# Patient Record
Sex: MALE | Race: WHITE | NOT HISPANIC OR LATINO | Employment: OTHER | URBAN - METROPOLITAN AREA
[De-identification: names, ages, dates, MRNs, and addresses within clinical notes are randomized per-mention and may not be internally consistent; named-entity substitution may affect disease eponyms.]

---

## 2019-10-21 ENCOUNTER — HOSPITAL ENCOUNTER (EMERGENCY)
Facility: HOSPITAL | Age: 49
Discharge: HOME | End: 2019-10-22
Attending: EMERGENCY MEDICINE | Admitting: EMERGENCY MEDICINE

## 2019-10-21 ENCOUNTER — APPOINTMENT (OUTPATIENT)
Dept: GENERAL RADIOLOGY | Facility: HOSPITAL | Age: 49
End: 2019-10-21

## 2019-10-21 DIAGNOSIS — M10.9 EXACERBATION OF GOUT: ICD-10-CM

## 2019-10-21 DIAGNOSIS — M25.551 RIGHT HIP PAIN: Primary | ICD-10-CM
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
## 2019-10-21 PROCEDURE — 99284 EMERGENCY DEPT VISIT MOD MDM: CPT

## 2019-10-21 RX ORDER — OXYCODONE HYDROCHLORIDE AND ACETAMINOPHEN 5; 325 MG/1; MG/1
2 TABLET ORAL ONCE
Status: COMPLETED | OUTPATIENT
Start: 2019-10-21 | End: 2019-10-21

## 2019-10-21 RX ADMIN — OXYCODONE HYDROCHLORIDE AND ACETAMINOPHEN 2 TABLET: 5; 325 TABLET ORAL at 23:58

## 2019-10-22 ENCOUNTER — APPOINTMENT (OUTPATIENT)
Dept: GENERAL RADIOLOGY | Facility: HOSPITAL | Age: 49
End: 2019-10-22

## 2019-10-22 VITALS
TEMPERATURE: 98.5 F | RESPIRATION RATE: 20 BRPM | HEART RATE: 69 BPM | SYSTOLIC BLOOD PRESSURE: 108 MMHG | OXYGEN SATURATION: 98 % | DIASTOLIC BLOOD PRESSURE: 71 MMHG

## 2019-10-22 PROCEDURE — 73502 X-RAY EXAM HIP UNI 2-3 VIEWS: CPT

## 2019-10-22 NOTE — ED PROVIDER NOTES
EMERGENCY DEPARTMENT ENCOUNTER    CHIEF COMPLAINT  Chief Complaint: Hip Pain  History given by: Patient  History limited by:   Room Number: 19/19  PMD: Provider, No Known      HPI:  Pt is a 49 y.o. male who presents complaining of R hip pain after falling from bed to floor. Pt has had a previous fx of the R hip. He is normally ambulatory with walker.  He denies any other injuries from his fall.  Patient states he is new to town and does not have a primary doctor.  Patient has history of chronic gout.  Patient states he is on chronic opioid pain medicine.  Patient reports a remote history of a right hip fracture with fixation.      PAST MEDICAL HISTORY  Active Ambulatory Problems     Diagnosis Date Noted   • No Active Ambulatory Problems     Resolved Ambulatory Problems     Diagnosis Date Noted   • No Resolved Ambulatory Problems     No Additional Past Medical History       PAST SURGICAL HISTORY  No past surgical history on file.    FAMILY HISTORY  No family history on file.    SOCIAL HISTORY  Social History     Socioeconomic History   • Marital status: Single     Spouse name: Not on file   • Number of children: Not on file   • Years of education: Not on file   • Highest education level: Not on file       ALLERGIES  Patient has no known allergies.    REVIEW OF SYSTEMS  Review of Systems   Constitutional: Negative for activity change, appetite change and fever.   HENT: Negative for congestion and sore throat.    Respiratory: Negative for cough and shortness of breath.    Cardiovascular: Negative for chest pain and leg swelling.   Gastrointestinal: Negative for abdominal pain, diarrhea and vomiting.   Genitourinary: Negative for decreased urine volume and dysuria.   Musculoskeletal: Positive for arthralgias and joint swelling. Negative for neck pain.   Skin: Negative for rash and wound.   Neurological: Negative for weakness, numbness and headaches.   Psychiatric/Behavioral: Negative.    All other systems reviewed and  are negative.      PHYSICAL EXAM  ED Triage Vitals [10/21/19 2335]   Temp Heart Rate Resp BP SpO2   98.3 °F (36.8 °C) 81 18 122/77 96 %      Temp src Heart Rate Source Patient Position BP Location FiO2 (%)   Tympanic -- -- -- --       Physical Exam   Constitutional: He is oriented to person, place, and time. No distress.   HENT:   Head: Normocephalic and atraumatic.   Eyes: EOM are normal. Pupils are equal, round, and reactive to light.   Neck: Normal range of motion. Neck supple.   Cardiovascular: Normal rate, regular rhythm and normal heart sounds.   Pulmonary/Chest: Effort normal and breath sounds normal. No respiratory distress.   Abdominal: Soft. There is no tenderness. There is no rebound and no guarding.   Musculoskeletal: He exhibits no edema.        Right hip: He exhibits decreased range of motion and tenderness.   Severe tophi of bilateral elbows with deformities of hand and feet c/w gout.    Neurological: He is alert and oriented to person, place, and time. He has normal sensation and normal strength.   Skin: Skin is warm and dry.   Psychiatric: Mood and affect normal.   Nursing note and vitals reviewed.    RADIOLOGY  XR Hip With or Without Pelvis 2 - 3 View Right   Preliminary Result   1. No acute osseous abnormality.                   I ordered the above noted radiological studies. Interpreted by radiologist. Reviewed by me in PACS.       PROCEDURES  Procedures      PROGRESS AND CONSULTS     11:55 PM  Ordered XR R hip. Percocet ordered for pain.    12:25 AM  Reviewed pt's history and workup with Dr. Mena.  After a bedside evaluation; Dr Mena agrees with the plan of care.    12:50 AM  Rechecked with pt. Discussed with pt about his imaging results showing nothing acute and plan to discharge. Informed of need for PCP follow-up for further tx of his gout. Pt understands and agrees with plan. All concerns were addressed.          MEDICAL DECISION MAKING  Results were reviewed/discussed with the patient and  they were also made aware of online access. Pt also made aware that some labs, such as cultures, will not be resulted during ER visit and follow up with PMD is necessary.     MDM  Number of Diagnoses or Management Options  Exacerbation of gout:   Fall, initial encounter:   Right hip pain:      Amount and/or Complexity of Data Reviewed  Tests in the radiology section of CPT®: reviewed and ordered (negative)           DIAGNOSIS  Final diagnoses:   Right hip pain   Fall, initial encounter   Exacerbation of gout       DISPOSITION  DISCHARGE    Patient discharged in stable condition.    Reviewed implications of results, diagnosis, meds, responsibility to follow up, warning signs and symptoms of possible worsening, potential complications and reasons to return to ER.    Patient/Family voiced understanding of above instructions.    Discussed plan for discharge, as there is no emergent indication for admission. Patient referred to primary care provider for BP management due to today's BP. Pt/family is agreeable and understands need for follow up and repeat testing.  Pt is aware that discharge does not mean that nothing is wrong but it indicates no emergency is present that requires admission and they must continue care with follow-up as given below or physician of their choice.     FOLLOW-UP  PATIENT LIAISON [facility] [id number] [phone]  Call   For Primary Physician follow-up         Medication List      No changes were made to your prescriptions during this visit.           Latest Documented Vital Signs:  As of 12:51 AM  BP- 112/72 HR- 74 Temp- 98.3 °F (36.8 °C) (Tympanic) O2 sat- 98%    --  Documentation assistance provided by andrew Griffiths for Gilbert June PA-C.  Information recorded by the scrnonae was done at my direction and has been verified and validated by me.     Aaron Griffiths  10/22/19 0052       Gilbert June PA  10/22/19 3919

## 2019-10-22 NOTE — ED NOTES
"Pt has hx of right hip fx, fell out of bed and is complaining of worsening hip pain, also c/o gout pain states \" I hurt all over.\"     Davina Kiran, RN  10/21/19 2026    "

## 2019-10-22 NOTE — ED PROVIDER NOTES
Pt presents to the ED c/o  right hip pain after falling out of bed this evening.  He denies hitting his head when he fell.  He states that he has a history of a right hip fracture which has been surgically repaired in [place] several years ago.  He also suffers with a severe gout and has had his left big toe amputated secondary to infection from a gouty flareup.     On exam,   His heart is regular rate and rhythm without any murmurs.  His lungs are clear to auscultation bilaterally.  His abdomen is normal active bowel sounds, soft, nontender nondistended.  His right hip has mild tenderness laterally with decreased range of motion secondary to pain.  However he is able to flex, externally and internally rotate his right hip.     Plan: I agree with plan of x-raying his hip and providing some pain control.     Attestation:  The DAVIAN and I have discussed this patient's history, physical exam, and treatment plan.  I have reviewed the documentation and personally had a face to face interaction with the patient. I affirm the documentation and agree with the treatment and plan.  The attached note describes my personal findings.       Dragon disclaimer:   Much of this encounter note is an electronic transcription/translation of spoken language to printed text.  The electronic translation of spoken language may permit erroneous, or at times, nonsensical words or phrases to be inadvertently transcribed.  Although I have reviewed the note for such areas, some may still exist.     Boy Mena MD  10/22/19 0046

## 2019-10-22 NOTE — PROGRESS NOTES
Pt states that he just moved from [place] and he needs a PCP. [facility] list given to pt and instructed on how to use the referral line. Becca Roman RN

## 2019-11-11 ENCOUNTER — OFFICE VISIT (OUTPATIENT)
Dept: FAMILY MEDICINE CLINIC | Facility: CLINIC | Age: 49
End: 2019-11-11

## 2019-11-11 VITALS
HEIGHT: 72 IN | BODY MASS INDEX: 30.26 KG/M2 | RESPIRATION RATE: 16 BRPM | SYSTOLIC BLOOD PRESSURE: 116 MMHG | DIASTOLIC BLOOD PRESSURE: 72 MMHG | HEART RATE: 72 BPM | TEMPERATURE: 97.9 F | WEIGHT: 223.4 LBS | OXYGEN SATURATION: 96 %

## 2019-11-11 DIAGNOSIS — Z23 NEED FOR VACCINATION: Primary | ICD-10-CM

## 2019-11-11 DIAGNOSIS — M1A.00X1 GOUTY ARTHROPATHY, CHRONIC, WITH TOPHI: ICD-10-CM

## 2019-11-11 DIAGNOSIS — M16.11 ARTHRITIS OF RIGHT HIP: ICD-10-CM

## 2019-11-11 PROCEDURE — 99386 PREV VISIT NEW AGE 40-64: CPT | Performed by: FAMILY MEDICINE

## 2019-11-11 PROCEDURE — G0008 ADMIN INFLUENZA VIRUS VAC: HCPCS | Performed by: FAMILY MEDICINE

## 2019-11-11 PROCEDURE — 90674 CCIIV4 VAC NO PRSV 0.5 ML IM: CPT | Performed by: FAMILY MEDICINE

## 2019-11-11 RX ORDER — FUROSEMIDE 40 MG/1
40 TABLET ORAL DAILY
COMMUNITY
End: 2019-12-12 | Stop reason: SDUPTHER

## 2019-11-11 RX ORDER — CARVEDILOL 6.25 MG/1
6.25 TABLET ORAL 2 TIMES DAILY
Refills: 0 | COMMUNITY
Start: 2019-10-24 | End: 2019-12-12 | Stop reason: SDUPTHER

## 2019-11-11 RX ORDER — OXYCODONE AND ACETAMINOPHEN 7.5; 325 MG/1; MG/1
1 TABLET ORAL EVERY 6 HOURS PRN
COMMUNITY
End: 2020-06-03 | Stop reason: SDUPTHER

## 2019-11-11 NOTE — PATIENT INSTRUCTIONS
Will obtain medical records from [place] and proceed accordingly.  Will likely place referral for GI physician to assist with management of liver cirrhosis.  Flu vaccination administered.  Prescription written for shower bench and for powered wheelchair.  Return to clinic as needed and for routine check ups.

## 2019-11-11 NOTE — PROGRESS NOTES
Subjective   Macario Carpio is a 49 y.o. male.     Chief Complaint   Patient presents with   • Establish Care     np    • Gout   • Blood Pressure Check     pt would like a refill on carvedilol    • face to face     power wheel chair        History of Present Illness   Patient presents to establish care.  Patient recently moved to [place] from [place], to live near his dad to help care for him.  He recently had lab work, echo, liver US 5 weeks ago.  He was diagnosed 2.5 years ago with cirrhosis. He has had one paracentesis, drained one liter. Has not needed any more paracenteses.  Will request health records from patient's physician in [place] and review.   He no longer drinks alcohol. Quit drinking a few years ago when he was started on pain medications for his hip and for gout.   He is disabled due to severe idiopathic gout, since he was a teenager, it has progressed over time. He has had amputation of left great toe due to gout complications. He takes daily colchicine. He reports that his uric acid stays around 10 or 11.   He takes folic acid over the counter.   He drinks a gallon of water every day.  His weight has increased since he relocated from [place], about 14 lbs, which he attributes to soft drinks, and not eating as healthy (eats fast food more in [place]).   He and his wife are living with his dad currently, and their 17.5yr old daughter.. He has two kids here in [place], grown.    He sees Dr. Wilkins for pain management, in [place]. The pain management is for his gout and for a broken hip- he has had surgery of the right hip a few years ago, has 4 screws placed and it is causing him problems now because he says the screws have moved. He has an apt with orthopedic surgeon tomorrow- Dr. Renan Mcqueen.    He has never been a smoker.    Father- idiopathic gout, CAD (5 MI's, first age 48 yrs), two CVA's (mild).  Mother- PD  Diabetes in [place]    Patient would like prescription for shower bench. He is also interested  "in getting a powered wheelchair. He is unable to use a walker or regular wheelchair due to significant gouty arthropathy of the hands and elbows. He has great difficulty walking for any significant distance due to pain.     Past Medical History:   Diagnosis Date   • Gout    • Hepatitis C      History reviewed. No pertinent surgical history.  Social History     Tobacco Use   • Smoking status: Never Smoker   • Smokeless tobacco: Never Used   Substance Use Topics   • Alcohol use: No     Frequency: Never   • Drug use: Not on file     Family History   Problem Relation Age of Onset   • Heart disease Mother    • Heart disease Father        Review of Systems   Constitutional: Negative for activity change, appetite change, fatigue, fever, unexpected weight gain and unexpected weight loss.   HENT: Negative for congestion, dental problem, sore throat and trouble swallowing.    Eyes: Negative for visual disturbance.   Respiratory: Negative for cough, chest tightness and shortness of breath.    Cardiovascular: Positive for leg swelling (Chronic, waxes and wanes.). Negative for chest pain.   Gastrointestinal: Negative for abdominal pain, constipation, diarrhea, nausea, vomiting and GERD.   Endocrine: Negative for polydipsia and polyuria.   Genitourinary: Negative for difficulty urinating.   Musculoskeletal: Positive for arthralgias. Negative for back pain.   Skin: Negative for rash.   Neurological: Negative for dizziness, numbness and headache.   Psychiatric/Behavioral: Negative for sleep disturbance and depressed mood. The patient is not nervous/anxious.        Objective   /72   Pulse 72   Temp 97.9 °F (36.6 °C) (Oral)   Resp 16   Ht 182.9 cm (72\")   Wt 101 kg (223 lb 6.4 oz)   SpO2 96%   BMI 30.30 kg/m²     Physical Exam   Constitutional: He appears well-developed and well-nourished. No distress.   Pleasant male.   HENT:   Head: Normocephalic.   Right Ear: Tympanic membrane, external ear and ear canal normal. "   Left Ear: Tympanic membrane, external ear and ear canal normal.   Mouth/Throat: Oropharynx is clear and moist.   Eyes: Conjunctivae and EOM are normal. Pupils are equal, round, and reactive to light.   Neck: Normal range of motion. Neck supple.   Cardiovascular: Normal rate, regular rhythm, normal heart sounds and intact distal pulses.   No murmur heard.  Trace pitting edema of the legs bilaterally.   Pulmonary/Chest: Effort normal and breath sounds normal. No respiratory distress. He has no wheezes. He has no rales.   Abdominal: Soft. Bowel sounds are normal. He exhibits no distension. There is no tenderness. There is no rebound and no guarding.   Musculoskeletal: Normal range of motion. He exhibits deformity (Significant gouty arthropathy, with gouty tophi of the hands bilaterally, and elbows. ).   Using cane for ambulation. Ambulates slowly, favoring right hip.   Lymphadenopathy:     He has no cervical adenopathy.   Neurological: He is alert.   Skin: Skin is warm and dry. Capillary refill takes less than 2 seconds.   Psychiatric: He has a normal mood and affect.   Vitals reviewed.      Procedures    Assessment/Plan   Macario GARCIA was seen today for establish care, gout, blood pressure check and face to face.    Diagnoses and all orders for this visit:    Need for vaccination  -     Flucelvax Quad=>4Years (Vial)    Gouty arthropathy, chronic, with tophi  -     Misc. Devices (TRANSFER BENCH) misc; 1 each Daily. To use while showering.  -     Motorized Wheelchair    Arthritis of right hip  -     Misc. Devices (TRANSFER BENCH) misc; 1 each Daily. To use while showering.  -     Motorized Wheelchair    Will obtain medical records from [place] and proceed accordingly.  Will likely place referral for GI physician to assist with management of liver cirrhosis.  Flu vaccination administered.  Prescription written for shower bench and powered wheelchair.  Return to clinic as needed and for routine check ups.        Patient  Instructions   Will obtain medical records from [place] and proceed accordingly.  Will likely place referral for GI physician to assist with management of liver cirrhosis.  Flu vaccination administered.  Prescription written for shower bench and for powered wheelchair.  Return to clinic as needed and for routine check ups.

## 2019-11-15 ENCOUNTER — TELEPHONE (OUTPATIENT)
Dept: FAMILY MEDICINE CLINIC | Facility: CLINIC | Age: 49
End: 2019-11-15

## 2019-11-15 NOTE — TELEPHONE ENCOUNTER
Patient is calling regarding his power wheelchair. He said he went to [facility] and they said they needed an order faxed over for the wheelchair. [facility] fax number is [phone]. The patients phone number is [phone].

## 2019-11-20 ENCOUNTER — TELEPHONE (OUTPATIENT)
Dept: FAMILY MEDICINE CLINIC | Facility: CLINIC | Age: 49
End: 2019-11-20

## 2019-11-20 NOTE — TELEPHONE ENCOUNTER
Received 11/24/19 records, called on 11/26/19 and called pt to get the actual EKG itself and not just the report.     Called to retrieve specific records from [facility] [place] where pt claims to have EKG, CXR and Labs, [phone]. Awaiting results.

## 2019-11-20 NOTE — TELEPHONE ENCOUNTER
Pt will be having a Hip Replacement with Dr.Matthew Mcqueen at [facility] [phone]. Pt states that he completed the EKG and Chest X-Ray. Pt will have labs done with , he just needs to have a release from .

## 2019-11-26 ENCOUNTER — TELEPHONE (OUTPATIENT)
Dept: FAMILY MEDICINE CLINIC | Facility: CLINIC | Age: 49
End: 2019-11-26

## 2019-11-26 NOTE — TELEPHONE ENCOUNTER
Called Lidia @ [facility] for her to return call to find out what they need from us to clear patient for surgery.

## 2019-12-09 ENCOUNTER — TELEPHONE (OUTPATIENT)
Dept: FAMILY MEDICINE CLINIC | Facility: CLINIC | Age: 49
End: 2019-12-09

## 2019-12-09 NOTE — TELEPHONE ENCOUNTER
Attempted to call pt advise pt upon speaking with place he will get surgery at will will need to do a complete work up cxr ekg, they are doing labs on the 17th and protime. In order to clear him.     vm is full cannot leave message. Will attempt to call back at a later time. As well as paper work we received for a power wheel chair he will need to come in for an appointment.

## 2019-12-12 RX ORDER — CARVEDILOL 6.25 MG/1
6.25 TABLET ORAL 2 TIMES DAILY
Qty: 60 TABLET | Refills: 2 | Status: SHIPPED | OUTPATIENT
Start: 2019-12-12 | End: 2020-11-30 | Stop reason: SDUPTHER

## 2019-12-12 RX ORDER — FUROSEMIDE 40 MG/1
40 TABLET ORAL DAILY
Qty: 30 TABLET | Refills: 2 | Status: SHIPPED | OUTPATIENT
Start: 2019-12-12 | End: 2020-07-24 | Stop reason: SDUPTHER

## 2019-12-12 RX ORDER — FOLIC ACID 1 MG/1
1 TABLET ORAL DAILY
Qty: 30 TABLET | Refills: 2 | Status: SHIPPED | OUTPATIENT
Start: 2019-12-12 | End: 2020-08-26 | Stop reason: SDUPTHER

## 2019-12-12 RX ORDER — COLCHICINE 0.6 MG/1
0.6 TABLET ORAL 2 TIMES DAILY
Qty: 60 TABLET | Refills: 1 | Status: SHIPPED | OUTPATIENT
Start: 2019-12-12 | End: 2020-11-30 | Stop reason: SDUPTHER

## 2019-12-20 ENCOUNTER — OFFICE VISIT (OUTPATIENT)
Dept: FAMILY MEDICINE CLINIC | Facility: CLINIC | Age: 49
End: 2019-12-20

## 2019-12-20 VITALS
BODY MASS INDEX: 30.3 KG/M2 | DIASTOLIC BLOOD PRESSURE: 60 MMHG | HEIGHT: 72 IN | HEART RATE: 69 BPM | TEMPERATURE: 98 F | OXYGEN SATURATION: 96 % | RESPIRATION RATE: 16 BRPM | SYSTOLIC BLOOD PRESSURE: 124 MMHG

## 2019-12-20 DIAGNOSIS — R73.9 HYPERGLYCEMIA: ICD-10-CM

## 2019-12-20 DIAGNOSIS — D64.9 ANEMIA, UNSPECIFIED TYPE: ICD-10-CM

## 2019-12-20 DIAGNOSIS — K70.30 ALCOHOLIC CIRRHOSIS OF LIVER WITHOUT ASCITES (HCC): ICD-10-CM

## 2019-12-20 DIAGNOSIS — D50.0 ANEMIA DUE TO GI BLOOD LOSS: ICD-10-CM

## 2019-12-20 DIAGNOSIS — Z13.220 SCREENING FOR HYPERLIPIDEMIA: ICD-10-CM

## 2019-12-20 DIAGNOSIS — Z01.818 PRE-OP EVALUATION: Primary | ICD-10-CM

## 2019-12-20 DIAGNOSIS — Z86.39 HISTORY OF HYPOTHYROIDISM: ICD-10-CM

## 2019-12-20 DIAGNOSIS — Z01.818 PRE-OP EVALUATION: ICD-10-CM

## 2019-12-20 PROCEDURE — 99214 OFFICE O/P EST MOD 30 MIN: CPT | Performed by: FAMILY MEDICINE

## 2019-12-20 RX ORDER — SPIRONOLACTONE 25 MG/1
25 TABLET ORAL DAILY
COMMUNITY
End: 2019-12-20

## 2019-12-20 NOTE — PATIENT INSTRUCTIONS
Patient Instructions    Keflex prescribed, take as directed for 5 days  Lab work and EKG ordered, will review results and plan for orthopedic surgery on 1/27/19  Will place referral for GI physician for screening colonoscopy and due to history of hepatitis  Return to clinic in one month for follow up, and as needed

## 2019-12-20 NOTE — PROGRESS NOTES
"Subjective   Macario Carpio is a 49 y.o. male.     Chief Complaint   Patient presents with   • surgery clearance       History of Present Illness     Patient presents for pre-operative evaluation prior to right hip surgery for screw removal which is planned for 1/27/2020 with Dr. Renan Mcqueen. Screws were initially placed in 2017 after a fall sustaining a hip fracture.  He has PMH of HTN, severe idiopathic gout, alcoholic cirrhosis of the liver, paroxysmal A-fib (corrected w/ cardioversion), AAA, hypothyroidism, and lower extremity edema.   His HTN is well controlled with carvedilol and furosemide. He no longer takes spironolactone.   Sees Dr. Wilkins in [place] for pain management on account of severe idiopathic gout and complications from previous right hip surgery. He currently takes daily colchicine and percocet. He is disabled due to the severe idiopathic gout as well as his right hip complications. He currently uses a cane and wheelchair for mobility, although he has difficulty using the wheelchair due to significant gouty arthropathy of the shoulders, hands and elbows. He has not yet been approved for a motorized wheelchair.   He moved to [place] from [place] in 9/2019 to live closer to his father.   Alcoholic cirrhosis of the liver was diagnosed a few years ago. Per reports, patient had several hospitalizations for alcoholism in summer of 2018 with hepatic encephalopathy and need for abdominal paracenteses. He states he quit drinking \"cold turkey\", and has apparently abstained from alcohol since 11/2018 and he denies complications from cirrhosiscurrently . He had imaging of the liver done 9/2019 in [place] which showed heterogenous and coarse appearance of the liver with no masses, and no ascites. Report reviewed by myself.   According to records, he has history of A-fib which was corrected with electrical cardioversion.   He also reports history of hypothyroidism but has not been on thyroid medication " "for some time- he used to take levothyroxine 50mcg daily.  He has normocytic anemia on recent lab work as well as labs reviewed from 9/2019, with hemoglobin stable at 10.   He denies blood in stool or dark tarry stools, hematuria, and hemoptysis/hematemesis.    He is looking forward to the hip surgery and states that he currently has no quality of life on account of his disability and pain. He does not like to take pain medications because they make him \"zone out\".     He recently cut himself on his right leg and he states orthopedics has requested antibiotic treatment to prevent infection prior to surgery.     Past Medical History:   Diagnosis Date   • Gout    • Hepatitis C      History reviewed. No pertinent surgical history.  Social History     Tobacco Use   • Smoking status: Never Smoker   • Smokeless tobacco: Never Used   Substance Use Topics   • Alcohol use: No     Frequency: Never   • Drug use: Not on file     Family History   Problem Relation Age of Onset   • Heart disease Mother    • Heart disease Father        Review of Systems   Constitutional: Negative for appetite change, fatigue and fever.   Respiratory: Negative for cough, chest tightness and shortness of breath.    Cardiovascular: Negative for chest pain, palpitations and leg swelling.   Gastrointestinal: Negative for abdominal distention, abdominal pain, blood in stool, constipation and diarrhea.       Objective   /60   Pulse 69   Temp 98 °F (36.7 °C) (Oral)   Resp 16   Ht 182.9 cm (72\")   SpO2 96%   BMI 30.30 kg/m²     Physical Exam   Constitutional: He appears well-developed and well-nourished. No distress.   Pleasant male   HENT:   Head: Normocephalic and atraumatic.   Eyes: Conjunctivae are normal.   Cardiovascular: Normal rate, regular rhythm, normal heart sounds and intact distal pulses.   Pulmonary/Chest: Effort normal and breath sounds normal. No stridor. No respiratory distress. He has no wheezes. He has no rales.   Abdominal: " Soft. Bowel sounds are normal. He exhibits no distension.   Musculoskeletal: Deformity: Significant gouty arthropathy, with pronounced large gouty tophi of the hands bilaterally, and elbows.   Neurological: He is alert.   Skin: Skin is warm and dry.   Small laceration of the anterior right leg, approximately 2.5 inches in length. Appears to be healing well with minimal surrounding erythema. No warmth, tenderness, or discharge.    Psychiatric: He has a normal mood and affect.   Vitals reviewed.      Procedures    Assessment/Plan   Macario GARCIA was seen today for surgery clearance.    Diagnoses and all orders for this visit:    Pre-op evaluation  -     CBC & Differential; Future  -     Comprehensive Metabolic Panel; Future  -     Cancel: ECG 12 Lead; Future  -     Urinalysis With Microscopic If Indicated (No Culture) - Urine, Clean Catch; Future    Screening for hyperlipidemia  -     Lipid Panel; Future    Alcoholic cirrhosis of liver without ascites (CMS/HCC)  -     Comprehensive Metabolic Panel; Future  -     Protime-INR; Future    Hyperglycemia  -     Hemoglobin A1c; Future    History of hypothyroidism  -     TSH; Future  -     T4; Future    Anemia, unspecified type  -     Iron and TIBC; Future  -     Ferritin; Future  -     Transferrin; Future  -     POCT Occult blood x 3, stool; Future  -     Cancel: POCT Occult blood x 3, stool           Patient Instructions    Keflex prescribed, take as directed for 5 days  Lab work and EKG ordered, will review results and plan for orthopedic surgery on 1/27/19  Will place referral for GI physician for screening colonoscopy and due to history of hepatitis  Return to clinic in one month for follow up, and as needed

## 2019-12-21 LAB
ALBUMIN SERPL-MCNC: 3.9 G/DL (ref 3.5–5.2)
ALBUMIN/GLOB SERPL: 1.4 G/DL
ALP SERPL-CCNC: 81 U/L (ref 39–117)
ALT SERPL-CCNC: 12 U/L (ref 1–41)
AST SERPL-CCNC: 32 U/L (ref 1–40)
BASOPHILS # BLD AUTO: ABNORMAL 10*3/UL
BASOPHILS # BLD MANUAL: 0.06 10*3/MM3 (ref 0–0.2)
BASOPHILS NFR BLD MANUAL: 2 % (ref 0–1.5)
BILIRUB SERPL-MCNC: 0.9 MG/DL (ref 0.2–1.2)
BUN SERPL-MCNC: 15 MG/DL (ref 6–20)
BUN/CREAT SERPL: 17 (ref 7–25)
CALCIUM SERPL-MCNC: 9.2 MG/DL (ref 8.6–10.5)
CHLORIDE SERPL-SCNC: 101 MMOL/L (ref 98–107)
CHOLEST SERPL-MCNC: 82 MG/DL (ref 0–200)
CO2 SERPL-SCNC: 19.6 MMOL/L (ref 22–29)
CREAT SERPL-MCNC: 0.88 MG/DL (ref 0.76–1.27)
DIFFERENTIAL COMMENT: ABNORMAL
EOSINOPHIL # BLD AUTO: ABNORMAL 10*3/UL
EOSINOPHIL # BLD MANUAL: 0.19 10*3/MM3 (ref 0–0.4)
EOSINOPHIL NFR BLD AUTO: ABNORMAL %
EOSINOPHIL NFR BLD MANUAL: 6.1 % (ref 0.3–6.2)
ERYTHROCYTE [DISTWIDTH] IN BLOOD BY AUTOMATED COUNT: 18.3 % (ref 12.3–15.4)
FERRITIN SERPL-MCNC: 18.2 NG/ML (ref 30–400)
GLOBULIN SER CALC-MCNC: 2.8 GM/DL
GLUCOSE SERPL-MCNC: 170 MG/DL (ref 65–99)
HBA1C MFR BLD: 4.9 % (ref 4.8–5.6)
HCT VFR BLD AUTO: 31.3 % (ref 37.5–51)
HDLC SERPL-MCNC: 47 MG/DL (ref 40–60)
HGB BLD-MCNC: 10.3 G/DL (ref 13–17.7)
INR PPP: 1.27 (ref 0.9–1.1)
IRON SATN MFR SERPL: 7 % (ref 20–50)
IRON SERPL-MCNC: 37 MCG/DL (ref 59–158)
LDLC SERPL CALC-MCNC: 25 MG/DL (ref 0–100)
LYMPHOCYTES # BLD AUTO: ABNORMAL 10*3/UL
LYMPHOCYTES # BLD MANUAL: 1.01 10*3/MM3 (ref 0.7–3.1)
LYMPHOCYTES NFR BLD AUTO: ABNORMAL %
LYMPHOCYTES NFR BLD MANUAL: 33.3 % (ref 19.6–45.3)
MCH RBC QN AUTO: 26.7 PG (ref 26.6–33)
MCHC RBC AUTO-ENTMCNC: 32.9 G/DL (ref 31.5–35.7)
MCV RBC AUTO: 81.1 FL (ref 79–97)
MONOCYTES # BLD MANUAL: 0.12 10*3/MM3 (ref 0.1–0.9)
MONOCYTES NFR BLD AUTO: ABNORMAL %
MONOCYTES NFR BLD MANUAL: 4 % (ref 5–12)
NEUTROPHILS # BLD MANUAL: 1.66 10*3/MM3 (ref 1.7–7)
NEUTROPHILS NFR BLD AUTO: ABNORMAL %
NEUTROPHILS NFR BLD MANUAL: 54.5 % (ref 42.7–76)
PLATELET # BLD AUTO: 108 10*3/MM3 (ref 140–450)
PLATELET BLD QL SMEAR: ABNORMAL
POTASSIUM SERPL-SCNC: 3.8 MMOL/L (ref 3.5–5.2)
PROT SERPL-MCNC: 6.7 G/DL (ref 6–8.5)
PROTHROMBIN TIME: 15.6 SECONDS (ref 11.7–14.2)
RBC # BLD AUTO: 3.86 10*6/MM3 (ref 4.14–5.8)
RBC MORPH BLD: ABNORMAL
SODIUM SERPL-SCNC: 137 MMOL/L (ref 136–145)
T4 SERPL-MCNC: 7.62 MCG/DL (ref 4.5–11.7)
TIBC SERPL-MCNC: 495 MCG/DL
TRANSFERRIN SERPL-MCNC: 337 MG/DL (ref 200–370)
TRIGL SERPL-MCNC: 51 MG/DL (ref 0–150)
TSH SERPL DL<=0.005 MIU/L-ACNC: 3.88 UIU/ML (ref 0.27–4.2)
UIBC SERPL-MCNC: 458 MCG/DL (ref 112–346)
VLDLC SERPL CALC-MCNC: 10.2 MG/DL
WBC # BLD AUTO: 3.04 10*3/MM3 (ref 3.4–10.8)

## 2019-12-23 ENCOUNTER — TELEPHONE (OUTPATIENT)
Dept: FAMILY MEDICINE CLINIC | Facility: CLINIC | Age: 49
End: 2019-12-23

## 2019-12-23 RX ORDER — CEPHALEXIN 500 MG/1
500 CAPSULE ORAL 2 TIMES DAILY
Qty: 10 CAPSULE | Refills: 0 | Status: SHIPPED | OUTPATIENT
Start: 2019-12-23 | End: 2019-12-28

## 2019-12-23 NOTE — TELEPHONE ENCOUNTER
Pt was seen on Friday and was told we would send in an antibiotic but the pharmacy never received it. [facility] on file is correct pharmacy.

## 2020-01-28 ENCOUNTER — TELEPHONE (OUTPATIENT)
Dept: FAMILY MEDICINE CLINIC | Facility: CLINIC | Age: 50
End: 2020-01-28

## 2020-06-01 ENCOUNTER — TELEPHONE (OUTPATIENT)
Dept: FAMILY MEDICINE CLINIC | Facility: CLINIC | Age: 50
End: 2020-06-01

## 2020-06-01 NOTE — TELEPHONE ENCOUNTER
PATIENT IS REQUESTING A FOLLOW UP APPOINTMENT DUE TO BEING POSSIBLY  DISCHARGED NEXT Monday I ALSO SPOKE WITH  WESTON MONTGOMERY FROM  [facility] IN [place] SHE SAYS  WHEN BEING DISCHARGED HE WILL ONLY HAVE A SCRIPT FOR THE OXYCODONE 50MG  FOR 3 DAYS THEY ARE ASKING TO BE SEEN AS SOON AS POSSIBLE AFTER DISCHARGE DUE TO BEING OUT OF THAT MEDICATION     GOOD CALL BACK NUMBER   [phone] (M)    [facility] [address] AT  [address] - [phone]  - [phone] FX POINTMENT

## 2020-06-03 DIAGNOSIS — M25.551 PAIN OF RIGHT HIP JOINT: ICD-10-CM

## 2020-06-03 DIAGNOSIS — M16.11 ARTHRITIS OF RIGHT HIP: Primary | ICD-10-CM

## 2020-06-03 RX ORDER — OXYCODONE AND ACETAMINOPHEN 7.5; 325 MG/1; MG/1
1 TABLET ORAL EVERY 8 HOURS PRN
Qty: 30 TABLET | Refills: 0 | Status: SHIPPED | OUTPATIENT
Start: 2020-06-03 | End: 2020-07-24 | Stop reason: SDUPTHER

## 2020-06-03 NOTE — TELEPHONE ENCOUNTER
Ok, I will call and let them know.  Please let me know when that has been sent in and I will call them at that time.  Thank you

## 2020-06-17 ENCOUNTER — OFFICE VISIT (OUTPATIENT)
Dept: FAMILY MEDICINE CLINIC | Facility: CLINIC | Age: 50
End: 2020-06-17

## 2020-06-17 VITALS
DIASTOLIC BLOOD PRESSURE: 66 MMHG | WEIGHT: 231.4 LBS | HEART RATE: 59 BPM | OXYGEN SATURATION: 99 % | SYSTOLIC BLOOD PRESSURE: 98 MMHG | HEIGHT: 72 IN | TEMPERATURE: 98.4 F | BODY MASS INDEX: 31.34 KG/M2 | RESPIRATION RATE: 16 BRPM

## 2020-06-17 DIAGNOSIS — D50.0 IRON DEFICIENCY ANEMIA DUE TO CHRONIC BLOOD LOSS: Primary | ICD-10-CM

## 2020-06-17 DIAGNOSIS — Z96.641 HISTORY OF RIGHT HIP REPLACEMENT: ICD-10-CM

## 2020-06-17 DIAGNOSIS — Z12.11 SCREENING FOR MALIGNANT NEOPLASM OF COLON: ICD-10-CM

## 2020-06-17 DIAGNOSIS — E87.1 HYPONATREMIA: ICD-10-CM

## 2020-06-17 PROCEDURE — 99213 OFFICE O/P EST LOW 20 MIN: CPT | Performed by: FAMILY MEDICINE

## 2020-06-17 NOTE — PROGRESS NOTES
Subjective   Macario Carpio is a 49 y.o. male.     Chief Complaint   Patient presents with   • Follow-up     Hospital visit (left hip replacement)       History of Present Illness   Macario presents for follow up after R TKA.  He had right hip replaced in January and was in rehab for about a month. Then pandemic occurred which delayed his follow up. He is now able to ambulate without a cane, and does not need wheelchair. He is living on his own currently, his wife will be moving here from [place] in the near future.  He admits to not being very active due to pandemic while he was in the NH- he plans to increase activity.    At last visit anemia work up was initiated which revealed iron deficiency anemia with positive FOBT. He has not started on iron supplement. Denies blood in stools or dark tarry stools. Is agreeable to colonoscopy.  His last Hgb in January was 9.7, and had been stable in range of 9-10.    He had apt with [facility] for management of chronic pain secondary to idiopathic gout and was started on Allopurinol in addition to Colchicine due to recommendation that this may help reduce gout over time.    No other concerns today.      Past Medical History:   Diagnosis Date   • Gout    • Hepatitis C      Past Surgical History:   Procedure Laterality Date   • TOTAL HIP ARTHROPLASTY Right      Social History     Tobacco Use   • Smoking status: Never Smoker   • Smokeless tobacco: Never Used   Substance Use Topics   • Alcohol use: No     Frequency: Never   • Drug use: Not on file     Family History   Problem Relation Age of Onset   • Heart disease Mother    • Heart disease Father        Review of Systems   Constitutional: Negative for activity change, appetite change, chills, fatigue, fever, unexpected weight gain and unexpected weight loss.   Respiratory: Negative for cough, chest tightness and shortness of breath.    Cardiovascular: Positive for leg swelling (Right sided leg swelling is improving  "since surgery). Negative for chest pain.   Gastrointestinal: Negative for abdominal pain, constipation, diarrhea, nausea and vomiting.   Neurological: Negative for light-headedness and numbness.       Objective   BP 98/66   Pulse 59   Temp 98.4 °F (36.9 °C)   Resp 16   Ht 182.9 cm (72\")   Wt 105 kg (231 lb 6.4 oz)   SpO2 99%   BMI 31.38 kg/m²     Physical Exam   Constitutional: He appears well-developed and well-nourished. No distress.   HENT:   Head: Normocephalic and atraumatic.   Eyes: Conjunctivae are normal.   Cardiovascular: Normal rate, regular rhythm, normal heart sounds and intact distal pulses.   Pulmonary/Chest: Effort normal and breath sounds normal. No stridor. No respiratory distress. He has no wheezes. He has no rales.   Musculoskeletal: He exhibits deformity (Significant gouty arthropathy, with gouty tophi of the hands bilaterally, and elbows).   Ambulating without cane today. Ambulation is slow, he demonstrates some difficulty standing from a seated position.   Neurological: He is alert.   Psychiatric: He has a normal mood and affect.   Vitals reviewed.      Procedures    Assessment/Plan   Macario GARCIA was seen today for follow-up.    Diagnoses and all orders for this visit:    Iron deficiency anemia due to chronic blood loss  -     CBC & Differential  -     Ambulatory Referral For Screening Colonoscopy    Hyponatremia  -     Comprehensive Metabolic Panel    History of right hip replacement    Screening for malignant neoplasm of colon  -     Ambulatory Referral For Screening Colonoscopy      Iron tablets prescribed.- take as discussed (starting with one tablet, increasing to three tablets per day or every other day as tolerated).  Lab work ordered to recheck anemia and hyponatremia seen on labs from hospitalization in January- will await results and proceed accordingly.  Colonoscopy ordered- office will call to schedule.  Increase physical activity. He declined referral for additional PT " today.  Return to clinic in 3 months for follow up, sooner if needed         Patient Instructions    Iron tablets prescribed.- take as directed  Lab work ordered- will contact you with results.  Colonoscopy ordered- office will call to schedule.  Return to clinic in 3 months for follow up, sooner if needed

## 2020-06-17 NOTE — PATIENT INSTRUCTIONS
Patient Instructions    Iron tablets prescribed.- take as directed  Lab work ordered- will contact you with results.  Colonoscopy ordered- office will call to schedule.  Return to clinic in 3 months for follow up, sooner if needed

## 2020-06-18 LAB
ALBUMIN SERPL-MCNC: 4.2 G/DL (ref 3.5–5.2)
ALBUMIN/GLOB SERPL: 1.4 G/DL
ALP SERPL-CCNC: 104 U/L (ref 39–117)
ALT SERPL-CCNC: 12 U/L (ref 1–41)
AST SERPL-CCNC: 34 U/L (ref 1–40)
BASOPHILS # BLD AUTO: 0.04 10*3/MM3 (ref 0–0.2)
BASOPHILS NFR BLD AUTO: 1.1 % (ref 0–1.5)
BILIRUB SERPL-MCNC: 1 MG/DL (ref 0.2–1.2)
BUN SERPL-MCNC: 13 MG/DL (ref 6–20)
BUN/CREAT SERPL: 17.1 (ref 7–25)
CALCIUM SERPL-MCNC: 9.4 MG/DL (ref 8.6–10.5)
CHLORIDE SERPL-SCNC: 105 MMOL/L (ref 98–107)
CO2 SERPL-SCNC: 22.6 MMOL/L (ref 22–29)
CREAT SERPL-MCNC: 0.76 MG/DL (ref 0.76–1.27)
EOSINOPHIL # BLD AUTO: 0.2 10*3/MM3 (ref 0–0.4)
EOSINOPHIL NFR BLD AUTO: 5.5 % (ref 0.3–6.2)
ERYTHROCYTE [DISTWIDTH] IN BLOOD BY AUTOMATED COUNT: 13.7 % (ref 12.3–15.4)
GLOBULIN SER CALC-MCNC: 3 GM/DL
GLUCOSE SERPL-MCNC: 79 MG/DL (ref 65–99)
HCT VFR BLD AUTO: 40.5 % (ref 37.5–51)
HGB BLD-MCNC: 14.3 G/DL (ref 13–17.7)
IMM GRANULOCYTES # BLD AUTO: 0 10*3/MM3 (ref 0–0.05)
IMM GRANULOCYTES NFR BLD AUTO: 0 % (ref 0–0.5)
LYMPHOCYTES # BLD AUTO: 1.23 10*3/MM3 (ref 0.7–3.1)
LYMPHOCYTES NFR BLD AUTO: 33.8 % (ref 19.6–45.3)
MCH RBC QN AUTO: 33.5 PG (ref 26.6–33)
MCHC RBC AUTO-ENTMCNC: 35.3 G/DL (ref 31.5–35.7)
MCV RBC AUTO: 94.8 FL (ref 79–97)
MONOCYTES # BLD AUTO: 0.42 10*3/MM3 (ref 0.1–0.9)
MONOCYTES NFR BLD AUTO: 11.5 % (ref 5–12)
NEUTROPHILS # BLD AUTO: 1.75 10*3/MM3 (ref 1.7–7)
NEUTROPHILS NFR BLD AUTO: 48.1 % (ref 42.7–76)
NRBC BLD AUTO-RTO: 0 /100 WBC (ref 0–0.2)
PLATELET # BLD AUTO: 99 10*3/MM3 (ref 140–450)
POTASSIUM SERPL-SCNC: 4 MMOL/L (ref 3.5–5.2)
PROT SERPL-MCNC: 7.2 G/DL (ref 6–8.5)
RBC # BLD AUTO: 4.27 10*6/MM3 (ref 4.14–5.8)
SODIUM SERPL-SCNC: 140 MMOL/L (ref 136–145)
WBC # BLD AUTO: 3.64 10*3/MM3 (ref 3.4–10.8)

## 2020-06-18 RX ORDER — FERROUS SULFATE 325(65) MG
325 TABLET ORAL
Qty: 90 TABLET | Refills: 1 | Status: SHIPPED | OUTPATIENT
Start: 2020-06-18 | End: 2020-06-19

## 2020-06-19 DIAGNOSIS — D69.6 THROMBOCYTOPENIA (HCC): Primary | ICD-10-CM

## 2020-06-19 NOTE — PROGRESS NOTES
Spoke with patient regarding lab results. His anemia has resolved, however he has moderate thrombocytopenia at 99,000. He denies any noticeable bleeding or bruising, no fevers.This new onset thrombocytopenia could be secondary to Allopurinol, which he started taking recently. Considering his severe idiopathic gout, I would be hesitant to discontinue this unless absolutely necessary  Informed patient that we need to have labs rechecked with a few other labs within the next few weeks. I will order CBC, peripheral blood smear. He was tested for HIV and HCV in December 2019 and results were negative. He will come to lab at [place] within the next few weeks. Will await results and proceed accordingly.

## 2020-06-24 ENCOUNTER — RESULTS ENCOUNTER (OUTPATIENT)
Dept: FAMILY MEDICINE CLINIC | Facility: CLINIC | Age: 50
End: 2020-06-24

## 2020-06-24 ENCOUNTER — TELEPHONE (OUTPATIENT)
Dept: FAMILY MEDICINE CLINIC | Facility: CLINIC | Age: 50
End: 2020-06-24

## 2020-06-24 DIAGNOSIS — D69.6 THROMBOCYTOPENIA (HCC): ICD-10-CM

## 2020-06-24 NOTE — TELEPHONE ENCOUNTER
Patient called in stating he saw information about a medication called  Krestexxya for gout. He would like to speak with the doctor regarding this meds.    Patient call back for advice at [phone]

## 2020-06-29 ENCOUNTER — LAB (OUTPATIENT)
Dept: LAB | Facility: HOSPITAL | Age: 50
End: 2020-06-29

## 2020-06-29 DIAGNOSIS — D69.6 THROMBOCYTOPENIA (HCC): ICD-10-CM

## 2020-06-29 DIAGNOSIS — D69.6 THROMBOCYTOPENIA (HCC): Primary | ICD-10-CM

## 2020-06-29 LAB
BASOPHILS # BLD AUTO: 0.05 10*3/MM3 (ref 0–0.2)
BASOPHILS NFR BLD AUTO: 0.9 % (ref 0–1.5)
DEPRECATED RDW RBC AUTO: 48.4 FL (ref 37–54)
EOSINOPHIL # BLD AUTO: 0.15 10*3/MM3 (ref 0–0.4)
EOSINOPHIL NFR BLD AUTO: 2.6 % (ref 0.3–6.2)
EOSINOPHIL NFR BLD MANUAL: 2 % (ref 0.3–6.2)
ERYTHROCYTE [DISTWIDTH] IN BLOOD BY AUTOMATED COUNT: 13.6 % (ref 12.3–15.4)
HCT VFR BLD AUTO: 41 % (ref 37.5–51)
HGB BLD-MCNC: 14.2 G/DL (ref 13–17.7)
IMM GRANULOCYTES # BLD AUTO: 0.02 10*3/MM3 (ref 0–0.05)
IMM GRANULOCYTES NFR BLD AUTO: 0.3 % (ref 0–0.5)
LYMPHOCYTES # BLD AUTO: 1.64 10*3/MM3 (ref 0.7–3.1)
LYMPHOCYTES # BLD MANUAL: NORMAL 10*3/UL
LYMPHOCYTES NFR BLD AUTO: 28.6 % (ref 19.6–45.3)
LYMPHOCYTES NFR BLD MANUAL: 30 % (ref 19.6–45.3)
LYMPHOCYTES NFR BLD MANUAL: 7 % (ref 5–12)
MCH RBC QN AUTO: 33.3 PG (ref 26.6–33)
MCHC RBC AUTO-ENTMCNC: 34.6 G/DL (ref 31.5–35.7)
MCV RBC AUTO: 96 FL (ref 79–97)
MONOCYTES # BLD AUTO: 0.52 10*3/MM3 (ref 0.1–0.9)
MONOCYTES NFR BLD AUTO: 9.1 % (ref 5–12)
NEUTROPHILS # BLD AUTO: 3.35 10*3/MM3 (ref 1.7–7)
NEUTROPHILS # BLD AUTO: NORMAL 10*3/UL
NEUTROPHILS NFR BLD AUTO: 58.5 % (ref 42.7–76)
NEUTROPHILS NFR BLD MANUAL: 61 % (ref 42.7–76)
NRBC BLD AUTO-RTO: 0 /100 WBC (ref 0–0.2)
PLAT MORPH BLD: NORMAL
PLATELET # BLD AUTO: 107 10*3/MM3 (ref 140–450)
PMV BLD AUTO: 11.8 FL (ref 6–12)
RBC # BLD AUTO: 4.27 10*6/MM3 (ref 4.14–5.8)
RBC MORPH BLD: NORMAL
WBC MORPH BLD: NORMAL
WBC NRBC COR # BLD: 5.73 10*3/MM3 (ref 3.4–10.8)

## 2020-06-29 PROCEDURE — 36415 COLL VENOUS BLD VENIPUNCTURE: CPT

## 2020-06-29 PROCEDURE — 85007 BL SMEAR W/DIFF WBC COUNT: CPT

## 2020-06-29 PROCEDURE — 85025 COMPLETE CBC W/AUTO DIFF WBC: CPT | Performed by: FAMILY MEDICINE

## 2020-06-29 PROCEDURE — 85027 COMPLETE CBC AUTOMATED: CPT

## 2020-06-29 NOTE — PROGRESS NOTES
Discussed results with patient. His platelets are stable and slightly improved.  I recommend we recheck in 6 weeks. He is planning to continue Allopurinol for now. Discussed possibility of pegloticase (krysstexa) which he had inquired about- this is an IV injection every 2 weeks- he does not want to try at this point. Instructed him to let me know right away if he experiences any bleeding: blood in stool, nose bleeds, etc. He verbalized understanding. Will consider hematology referral if needed in the future.

## 2020-07-06 NOTE — TELEPHONE ENCOUNTER
Spoke with patient regarding this last week- see result note from recent lab work for additional details.

## 2020-07-23 ENCOUNTER — TELEPHONE (OUTPATIENT)
Dept: FAMILY MEDICINE CLINIC | Facility: CLINIC | Age: 50
End: 2020-07-23

## 2020-07-23 DIAGNOSIS — M25.551 PAIN OF RIGHT HIP JOINT: ICD-10-CM

## 2020-07-23 RX ORDER — FUROSEMIDE 40 MG/1
40 TABLET ORAL DAILY
Qty: 30 TABLET | Refills: 2 | Status: CANCELLED | OUTPATIENT
Start: 2020-07-23

## 2020-07-23 RX ORDER — OXYCODONE AND ACETAMINOPHEN 7.5; 325 MG/1; MG/1
1 TABLET ORAL EVERY 8 HOURS PRN
Qty: 30 TABLET | Refills: 0 | Status: CANCELLED | OUTPATIENT
Start: 2020-07-23

## 2020-07-23 NOTE — TELEPHONE ENCOUNTER
PATIENT CALLED IN TO REQUEST A REFILL OF  furosemide (LASIX) 40 MG tablet   AND  APERINAOL       SENT TO [address] AT  [address] - [phone] [facility] [phone]   [phone]        PATIENT CALLBACK  [phone]

## 2020-07-24 DIAGNOSIS — M25.551 PAIN OF RIGHT HIP JOINT: ICD-10-CM

## 2020-07-24 RX ORDER — FUROSEMIDE 40 MG/1
40 TABLET ORAL DAILY
Qty: 30 TABLET | Refills: 2 | Status: SHIPPED | OUTPATIENT
Start: 2020-07-24 | End: 2020-08-26 | Stop reason: SDUPTHER

## 2020-07-24 RX ORDER — OXYCODONE AND ACETAMINOPHEN 7.5; 325 MG/1; MG/1
1 TABLET ORAL EVERY 8 HOURS PRN
Qty: 30 TABLET | Refills: 0 | Status: SHIPPED | OUTPATIENT
Start: 2020-07-24 | End: 2020-07-30 | Stop reason: SDUPTHER

## 2020-07-27 ENCOUNTER — TELEPHONE (OUTPATIENT)
Dept: FAMILY MEDICINE CLINIC | Facility: CLINIC | Age: 50
End: 2020-07-27

## 2020-07-30 DIAGNOSIS — M25.551 PAIN OF RIGHT HIP JOINT: ICD-10-CM

## 2020-07-30 RX ORDER — OXYCODONE AND ACETAMINOPHEN 7.5; 325 MG/1; MG/1
1 TABLET ORAL EVERY 8 HOURS PRN
Qty: 30 TABLET | Refills: 0 | Status: SHIPPED | OUTPATIENT
Start: 2020-07-30 | End: 2020-08-12 | Stop reason: SDUPTHER

## 2020-08-11 ENCOUNTER — RESULTS ENCOUNTER (OUTPATIENT)
Dept: FAMILY MEDICINE CLINIC | Facility: CLINIC | Age: 50
End: 2020-08-11

## 2020-08-11 DIAGNOSIS — D69.6 THROMBOCYTOPENIA (HCC): ICD-10-CM

## 2020-08-11 LAB
BASOPHILS # BLD AUTO: ABNORMAL 10*3/UL
BASOPHILS # BLD MANUAL: 0.06 10*3/MM3 (ref 0–0.2)
BASOPHILS NFR BLD MANUAL: 1 % (ref 0–1.5)
DIFFERENTIAL COMMENT: NORMAL
EOSINOPHIL # BLD AUTO: ABNORMAL 10*3/UL
EOSINOPHIL NFR BLD AUTO: ABNORMAL %
ERYTHROCYTE [DISTWIDTH] IN BLOOD BY AUTOMATED COUNT: 13.4 % (ref 12.3–15.4)
HCT VFR BLD AUTO: 46.6 % (ref 37.5–51)
HGB BLD-MCNC: 16.6 G/DL (ref 13–17.7)
LYMPHOCYTES # BLD AUTO: ABNORMAL 10*3/UL
LYMPHOCYTES # BLD MANUAL: 1.88 10*3/MM3 (ref 0.7–3.1)
LYMPHOCYTES NFR BLD AUTO: ABNORMAL %
LYMPHOCYTES NFR BLD MANUAL: 33.3 % (ref 19.6–45.3)
MCH RBC QN AUTO: 33.9 PG (ref 26.6–33)
MCHC RBC AUTO-ENTMCNC: 35.6 G/DL (ref 31.5–35.7)
MCV RBC AUTO: 95.1 FL (ref 79–97)
MONOCYTES # BLD MANUAL: 0.46 10*3/MM3 (ref 0.1–0.9)
MONOCYTES NFR BLD AUTO: ABNORMAL %
MONOCYTES NFR BLD MANUAL: 8.1 % (ref 5–12)
NEUTROPHILS # BLD MANUAL: 3.25 10*3/MM3 (ref 1.7–7)
NEUTROPHILS NFR BLD AUTO: ABNORMAL %
NEUTROPHILS NFR BLD MANUAL: 57.6 % (ref 42.7–76)
PLATELET # BLD AUTO: 141 10*3/MM3 (ref 140–450)
PLATELET BLD QL SMEAR: NORMAL
RBC # BLD AUTO: 4.9 10*6/MM3 (ref 4.14–5.8)
RBC MORPH BLD: NORMAL
WBC # BLD AUTO: 5.64 10*3/MM3 (ref 3.4–10.8)

## 2020-08-11 NOTE — PROGRESS NOTES
Laura,    Can you please call Mukesh and let him know his platelets are now normal! Please let me know if he has any questions and I will plan to see him at his next apt. Thank you!

## 2020-08-12 ENCOUNTER — TELEPHONE (OUTPATIENT)
Dept: FAMILY MEDICINE CLINIC | Facility: CLINIC | Age: 50
End: 2020-08-12

## 2020-08-12 DIAGNOSIS — M25.551 PAIN OF RIGHT HIP JOINT: ICD-10-CM

## 2020-08-12 RX ORDER — OXYCODONE AND ACETAMINOPHEN 7.5; 325 MG/1; MG/1
1 TABLET ORAL EVERY 8 HOURS PRN
Qty: 30 TABLET | Refills: 0 | Status: SHIPPED | OUTPATIENT
Start: 2020-08-12 | End: 2020-08-13 | Stop reason: SDUPTHER

## 2020-08-12 NOTE — TELEPHONE ENCOUNTER
PATIENT WENT TO PHARMACY TO  HIS PERCOCET AND THEY TOLD HIM HE HAD 6 DAYS LEFT BEFORE HE COULD GET IT FILLED PLEASE ADVISE     PATIENT STATED HE PICKED IT UP LAST Wednesday AND IT IS ONLY A 10 DAY SUPPLY     PATIENT CALL BACK NUMBER [phone]

## 2020-08-12 NOTE — TELEPHONE ENCOUNTER
Caller: Macario Carpio    Relationship: Self    Best call back number: [phone]     Medication needed:   Requested Prescriptions     Pending Prescriptions Disp Refills   • oxyCODONE-acetaminophen (PERCOCET) 7.5-325 MG per tablet 30 tablet 0     Sig: Take 1 tablet by mouth Every 8 (Eight) Hours As Needed for Severe Pain .         What details did the patient provide when requesting the medication: Patient would like a months worth if possible         What is the patient's preferred pharmacy: [facility] [address] AT  [address] - [phone]  - [phone] FX         
In order to meet Medicare requirements, the clinical documentation must support the information cited in the admission order.  Please be sure to provide detailed and clear documentation about the following in the admitting note/history and physical:

## 2020-08-13 DIAGNOSIS — M25.551 PAIN OF RIGHT HIP JOINT: ICD-10-CM

## 2020-08-13 RX ORDER — OXYCODONE AND ACETAMINOPHEN 7.5; 325 MG/1; MG/1
1 TABLET ORAL EVERY 8 HOURS PRN
Qty: 90 TABLET | Refills: 0 | Status: SHIPPED | OUTPATIENT
Start: 2020-08-13 | End: 2020-09-11 | Stop reason: SDUPTHER

## 2020-08-17 ENCOUNTER — LAB (OUTPATIENT)
Dept: LAB | Facility: HOSPITAL | Age: 50
End: 2020-08-17

## 2020-08-17 ENCOUNTER — PREP FOR SURGERY (OUTPATIENT)
Dept: OTHER | Facility: HOSPITAL | Age: 50
End: 2020-08-17

## 2020-08-17 ENCOUNTER — OFFICE VISIT (OUTPATIENT)
Dept: GASTROENTEROLOGY | Facility: CLINIC | Age: 50
End: 2020-08-17

## 2020-08-17 VITALS
TEMPERATURE: 98.2 F | SYSTOLIC BLOOD PRESSURE: 124 MMHG | HEIGHT: 72 IN | OXYGEN SATURATION: 98 % | HEART RATE: 90 BPM | BODY MASS INDEX: 29.93 KG/M2 | WEIGHT: 221 LBS | DIASTOLIC BLOOD PRESSURE: 78 MMHG

## 2020-08-17 DIAGNOSIS — D50.9 IRON DEFICIENCY ANEMIA, UNSPECIFIED IRON DEFICIENCY ANEMIA TYPE: Primary | ICD-10-CM

## 2020-08-17 DIAGNOSIS — K74.60 HEPATIC CIRRHOSIS, UNSPECIFIED HEPATIC CIRRHOSIS TYPE, UNSPECIFIED WHETHER ASCITES PRESENT (HCC): ICD-10-CM

## 2020-08-17 DIAGNOSIS — Z12.11 SCREENING FOR MALIGNANT NEOPLASM OF COLON: ICD-10-CM

## 2020-08-17 LAB — AMMONIA BLD-SCNC: 114 UMOL/L (ref 16–60)

## 2020-08-17 PROCEDURE — 82140 ASSAY OF AMMONIA: CPT | Performed by: INTERNAL MEDICINE

## 2020-08-17 PROCEDURE — 36415 COLL VENOUS BLD VENIPUNCTURE: CPT | Performed by: INTERNAL MEDICINE

## 2020-08-17 PROCEDURE — 99204 OFFICE O/P NEW MOD 45 MIN: CPT | Performed by: INTERNAL MEDICINE

## 2020-08-17 PROCEDURE — 82105 ALPHA-FETOPROTEIN SERUM: CPT | Performed by: INTERNAL MEDICINE

## 2020-08-17 RX ORDER — SPIRONOLACTONE 50 MG/1
25 TABLET, FILM COATED ORAL 2 TIMES DAILY
COMMUNITY
Start: 2020-05-28 | End: 2021-04-29

## 2020-08-17 NOTE — PROGRESS NOTES
Colonoscopy      HPI  Patient here for consultation iron deficiency anemia.  Patient was noted to be iron deficient with anemia back in December of last year.  He was also stool Hemoccult positive. currently his hemoglobin has returned to normal.    He has never had an EGD or colonoscopy.    He has a history of cirrhosis of the liver from hepatitis C. He had been told this had cleared after treatment which he received in [place]. He is on lactulose for hepatic encephalopathy.    He denies rectal bleeding or dark tarry stools.    Review of Systems   Constitutional: Negative for appetite change, chills, diaphoresis, fatigue, fever and unexpected weight change.   HENT: Negative for dental problem, ear pain, mouth sores, rhinorrhea, sore throat and voice change.    Eyes: Negative for pain, redness and visual disturbance.   Respiratory: Negative for cough, chest tightness and wheezing.    Cardiovascular: Negative for chest pain, palpitations and leg swelling.   Endocrine: Negative for cold intolerance, heat intolerance, polydipsia, polyphagia and polyuria.   Genitourinary: Negative for dysuria, frequency, hematuria and urgency.   Musculoskeletal: Positive for arthralgias. Negative for back pain, joint swelling, myalgias and neck pain.   Skin: Negative for rash.   Allergic/Immunologic: Negative for environmental allergies, food allergies and immunocompromised state.   Neurological: Negative for dizziness, seizures, weakness, numbness and headaches.   Hematological: Does not bruise/bleed easily.   Psychiatric/Behavioral: Negative for sleep disturbance. The patient is not nervous/anxious.         I have reviewed and confirmed the accuracy of the HPI and ROS as documented by the APRN Kraig Almeida MD     Problem List:  There is no problem list on file for this patient.      Medical History:    Past Medical History:   Diagnosis Date   • Gout    • Hepatitis C         Social History:    Social History     Socioeconomic  History   • Marital status: Single     Spouse name: Not on file   • Number of children: Not on file   • Years of education: Not on file   • Highest education level: Not on file   Tobacco Use   • Smoking status: Never Smoker   • Smokeless tobacco: Never Used   Substance and Sexual Activity   • Alcohol use: No     Frequency: Never       Family History:   Family History   Problem Relation Age of Onset   • Heart disease Mother    • Heart disease Father        Surgical History:   Past Surgical History:   Procedure Laterality Date   • TOTAL HIP ARTHROPLASTY Right          Current Outpatient Medications:   •  carvedilol (COREG) 6.25 MG tablet, Take 1 tablet by mouth 2 (Two) Times a Day., Disp: 60 tablet, Rfl: 2  •  colchicine 0.6 MG tablet, Take 1 tablet by mouth 2 (Two) Times a Day., Disp: 60 tablet, Rfl: 1  •  folic acid (FOLVITE) 1 MG tablet, Take 1 tablet by mouth Daily., Disp: 30 tablet, Rfl: 2  •  furosemide (LASIX) 40 MG tablet, Take 1 tablet by mouth Daily., Disp: 30 tablet, Rfl: 2  •  Misc. Devices (TRANSFER BENCH) misc, 1 each Daily. To use while showering., Disp: 1 each, Rfl: 0  •  mupirocin (BACTROBAN) 2 % ointment, , Disp: , Rfl:   •  oxyCODONE-acetaminophen (PERCOCET) 7.5-325 MG per tablet, Take 1 tablet by mouth Every 8 (Eight) Hours As Needed for Severe Pain ., Disp: 90 tablet, Rfl: 0  •  spironolactone (ALDACTONE) 50 MG tablet, Take 25 mg by mouth 2 (Two) Times a Day., Disp: , Rfl:     Allergies:   Allergies   Allergen Reactions   • Indomethacin Other (See Comments)     Lips sting and lightheadedness        The following portions of the patient's history were reviewed and updated as appropriate: allergies, current medications, past family history, past medical history, past social history, past surgical history and problem list.    Vitals:    08/17/20 1311   BP: 124/78   Pulse: 90   Temp: 98.2 °F (36.8 °C)   SpO2: 98%         08/17/20  1311   Weight: 100 kg (221 lb)     Body mass index is 29.97  kg/m².      PHYSICAL EXAM:  Physical Exam   Constitutional: He appears well-developed.   HENT:   Nose: Nose normal. No nasal deformity.   Eyes: No scleral icterus.   Neck: No tracheal deviation present.   Pulmonary/Chest: Effort normal and breath sounds normal. No respiratory distress.   Abdominal: Soft. Normal appearance and bowel sounds are normal. He exhibits no shifting dullness and no distension. There is no hepatosplenomegaly. There is no tenderness. There is no rigidity, no rebound and no guarding. No hernia.   Musculoskeletal:   Gout related prominences on both elbows   Lymphadenopathy:   No periumbilical lymphadenopathy   Neurological: He is alert.   Skin: Skin is warm. No cyanosis.   Psychiatric: He has a normal mood and affect. His behavior is normal.   Vitals reviewed.          Assessment/ Plan  Macario was seen today for colonoscopy.    Diagnoses and all orders for this visit:    Iron deficiency anemia, unspecified iron deficiency anemia type  -     AFP Tumor Marker  -     US Liver; Future  -     Ammonia    Hepatic cirrhosis, unspecified hepatic cirrhosis type, unspecified whether ascites present (CMS/HCC)  -     AFP Tumor Marker  -     US Liver; Future  -     Ammonia         Return for Review results after testing complete.    Patient Instructions   Schedule EGD & colonoscopy for further evaluation of iron deficiency anemia.    For monitoring of cirrhosis, schedule liver ultrasound.    Check AFP for surveillance for hepatoma.          Discussion:  We are asked see the patient day for evaluation of his iron deficiency anemia and heme positive stool.  He also has history of hepatitis C with cirrhosis.  We will check an EGD for evaluation of anemia and also to assess for varices and a colonoscopy both for screening purposes and to evaluate his iron deficiency anemia.  In addition, we will check his ammonia level, alpha-fetoprotein, and a right upper quadrant ultrasound to rule out  hepatoma.    Documentation by Fabiola NOLEN acting as a scribe in the following sections on behalf of the billable provider: HPI, ROS, assessment, & plan.

## 2020-08-17 NOTE — PATIENT INSTRUCTIONS
Schedule EGD & colonoscopy for further evaluation of iron deficiency anemia.    For monitoring of cirrhosis, schedule liver ultrasound.    Check AFP for surveillance for hepatoma.

## 2020-08-18 LAB — ALPHA-FETOPROTEIN: 2.87 NG/ML (ref 0–8.3)

## 2020-08-26 ENCOUNTER — TELEPHONE (OUTPATIENT)
Dept: FAMILY MEDICINE CLINIC | Facility: CLINIC | Age: 50
End: 2020-08-26

## 2020-08-26 NOTE — TELEPHONE ENCOUNTER
PATIENT CALLED IN AND STATED HE NEEDS A REFILL OF folic acid (FOLVITE) 1 MG tablet  AND furosemide (LASIX) 40 MG tablet       SENT TO [facility] [address] - [phone] [facility] [phone]   [phone]        PATIENT CALL BACK  [phone]

## 2020-08-27 RX ORDER — FOLIC ACID 1 MG/1
1 TABLET ORAL DAILY
Qty: 30 TABLET | Refills: 5 | Status: SHIPPED | OUTPATIENT
Start: 2020-08-27 | End: 2020-12-16

## 2020-08-27 RX ORDER — FUROSEMIDE 40 MG/1
40 TABLET ORAL DAILY
Qty: 30 TABLET | Refills: 5 | Status: SHIPPED | OUTPATIENT
Start: 2020-08-27 | End: 2021-04-29 | Stop reason: SDUPTHER

## 2020-09-10 DIAGNOSIS — M25.551 PAIN OF RIGHT HIP JOINT: ICD-10-CM

## 2020-09-10 RX ORDER — OXYCODONE AND ACETAMINOPHEN 7.5; 325 MG/1; MG/1
1 TABLET ORAL EVERY 8 HOURS PRN
Qty: 90 TABLET | Refills: 0 | OUTPATIENT
Start: 2020-09-10

## 2020-09-10 NOTE — TELEPHONE ENCOUNTER
Pt has requested percocet refills but has a PM provider. Patient was informed he would need to continue care with his PM provider due to this is a chronic medication issues. Pt understands and will schedule a follow up.

## 2020-09-10 NOTE — TELEPHONE ENCOUNTER
Caller: Macario Carpio    Relationship: Self    Best call back number: [phone]*    Medication needed:   Requested Prescriptions     Pending Prescriptions Disp Refills   • oxyCODONE-acetaminophen (PERCOCET) 7.5-325 MG per tablet 90 tablet 0     Sig: Take 1 tablet by mouth Every 8 (Eight) Hours As Needed for Severe Pain .       When do you need the refill by:     What details did the patient provide when requesting the medication:     Does the patient have less than a 3 day supply:  [] Yes  [x] No    What is the patient's preferred pharmacy: [facility] [address] AT  [address] - [phone]  - [phone] FX

## 2020-09-10 NOTE — TELEPHONE ENCOUNTER
Patient has pain management.  has discussed this with him and he will getting Percocets from pain management from now on.

## 2020-09-11 DIAGNOSIS — M25.551 PAIN OF RIGHT HIP JOINT: ICD-10-CM

## 2020-09-11 RX ORDER — OXYCODONE AND ACETAMINOPHEN 7.5; 325 MG/1; MG/1
1 TABLET ORAL EVERY 8 HOURS PRN
Qty: 90 TABLET | Refills: 0 | Status: SHIPPED | OUTPATIENT
Start: 2020-09-11 | End: 2020-10-13 | Stop reason: SDUPTHER

## 2020-09-14 RX ORDER — ALLOPURINOL 100 MG/1
TABLET ORAL
Qty: 45 TABLET | Refills: 60 | Status: SHIPPED | OUTPATIENT
Start: 2020-09-14 | End: 2021-04-29

## 2020-09-14 NOTE — PROGRESS NOTES
Spoke with Macario by phone today- there has been a misunderstanding as to what is needed for him to continue getting pain management from me. He came in to sign a form but was not told that he needs to make an appointment. I mistakenly have not clearly documented the indication and reasoning for his medication appropriately. He takes Percocet (for the past several years) for severe chronic multiarticular idiopathic gout and he states has been unable to afford his regular appointments with pain management which cost him $40.00 co-pay every 6 weeks.   His examinations with me in the past have revealed significant gouty arthropathy with gouti tophi of the hands and elbows bilaterally. He also has history of L great toe amputation secondary to gout complications. 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    I agree with plan for chronic pain management for Macario- He has an appointment with me next week in the office.   He states that allopurinol was helping to reduce his gout but it was not refilled- I will refill it for him today. If daily allopurinol is effective enough, perhaps we can discuss weaning off or decreasing need for Percocet in the future.     Dr. Alegria   9/14/20

## 2020-09-21 ENCOUNTER — OFFICE VISIT (OUTPATIENT)
Dept: FAMILY MEDICINE CLINIC | Facility: CLINIC | Age: 50
End: 2020-09-21

## 2020-09-21 VITALS
TEMPERATURE: 98 F | SYSTOLIC BLOOD PRESSURE: 112 MMHG | BODY MASS INDEX: 29.24 KG/M2 | HEIGHT: 72 IN | OXYGEN SATURATION: 97 % | DIASTOLIC BLOOD PRESSURE: 78 MMHG | HEART RATE: 81 BPM | WEIGHT: 215.9 LBS | RESPIRATION RATE: 16 BRPM

## 2020-09-21 DIAGNOSIS — R52 ENCOUNTER FOR PAIN MANAGEMENT: ICD-10-CM

## 2020-09-21 DIAGNOSIS — M1A.00X0 CHRONIC IDIOPATHIC GOUT: ICD-10-CM

## 2020-09-21 DIAGNOSIS — Z79.891 CHRONIC USE OF OPIATE FOR THERAPEUTIC PURPOSE: Primary | ICD-10-CM

## 2020-09-21 PROCEDURE — 99213 OFFICE O/P EST LOW 20 MIN: CPT | Performed by: FAMILY MEDICINE

## 2020-09-22 LAB
AMPHETAMINES UR QL SCN: NEGATIVE NG/ML
BARBITURATES UR QL SCN: NEGATIVE NG/ML
BENZODIAZ UR QL SCN: NEGATIVE NG/ML
BZE UR QL SCN: NEGATIVE NG/ML
CANNABINOIDS UR QL SCN: POSITIVE NG/ML
CREAT UR-MCNC: 42.2 MG/DL (ref 20–300)
LABORATORY COMMENT REPORT: ABNORMAL
METHADONE UR QL SCN: NEGATIVE NG/ML
OPIATES UR QL SCN: NEGATIVE NG/ML
OXYCODONE+OXYMORPHONE UR QL SCN: POSITIVE NG/ML
PCP UR QL: NEGATIVE NG/ML
PH UR: 7.4 [PH] (ref 4.5–8.9)
PROPOXYPH UR QL SCN: NEGATIVE NG/ML

## 2020-10-13 DIAGNOSIS — M25.551 PAIN OF RIGHT HIP JOINT: ICD-10-CM

## 2020-10-13 RX ORDER — OXYCODONE AND ACETAMINOPHEN 7.5; 325 MG/1; MG/1
1 TABLET ORAL EVERY 8 HOURS PRN
Qty: 90 TABLET | Refills: 0 | Status: SHIPPED | OUTPATIENT
Start: 2020-10-13 | End: 2020-11-09 | Stop reason: SDUPTHER

## 2020-10-13 NOTE — TELEPHONE ENCOUNTER
Caller: Macario Carpio    Relationship: Self    Best call back number: [phone]    Medication needed:   Requested Prescriptions     Pending Prescriptions Disp Refills   • oxyCODONE-acetaminophen (PERCOCET) 7.5-325 MG per tablet 90 tablet 0     Sig: Take 1 tablet by mouth Every 8 (Eight) Hours As Needed for Severe Pain .       When do you need the refill by: ASAP    What details did the patient provide when requesting the medication: Said completely out.    Does the patient have less than a 3 day supply:  [x] Yes  [] No    What is the patient's preferred pharmacy: [facility] [address] AT  [address] - [phone]  - [phone] FX

## 2020-10-16 ENCOUNTER — TRANSCRIBE ORDERS (OUTPATIENT)
Dept: LAB | Facility: HOSPITAL | Age: 50
End: 2020-10-16

## 2020-10-16 DIAGNOSIS — Z01.818 OTHER SPECIFIED PRE-OPERATIVE EXAMINATION: Primary | ICD-10-CM

## 2020-10-24 ENCOUNTER — APPOINTMENT (OUTPATIENT)
Dept: LAB | Facility: HOSPITAL | Age: 50
End: 2020-10-24

## 2020-11-10 ENCOUNTER — TELEPHONE (OUTPATIENT)
Dept: FAMILY MEDICINE CLINIC | Facility: CLINIC | Age: 50
End: 2020-11-10

## 2020-11-10 NOTE — TELEPHONE ENCOUNTER
Patient called in and stated he needs a refill of oxyCODONE-acetaminophen (PERCOCET) 7.5-325 MG per tablet    patient has 2 pills left         Sent to [facility] [address] - [address] [place] [address] AT  [address] - [phone]  - [phone] FX  [phone]      Patient call back [phone]

## 2020-11-30 RX ORDER — COLCHICINE 0.6 MG/1
0.6 TABLET ORAL 2 TIMES DAILY
Qty: 60 TABLET | Refills: 1 | Status: SHIPPED | OUTPATIENT
Start: 2020-11-30 | End: 2021-04-29 | Stop reason: SDUPTHER

## 2020-11-30 RX ORDER — CARVEDILOL 6.25 MG/1
6.25 TABLET ORAL 2 TIMES DAILY
Qty: 60 TABLET | Refills: 2 | Status: SHIPPED | OUTPATIENT
Start: 2020-11-30 | End: 2020-12-07 | Stop reason: SDUPTHER

## 2020-11-30 NOTE — TELEPHONE ENCOUNTER
PATIENT IS CALLING NEEDING A REFILL     PATIENT IS CALLING TO SEE IF DR. CERVANTES COULD IN A ORDER TO REFILL MEDICATION carvedilol (COREG) 6.25 MG tablet'    PATIENT ONLY HAS FEW DAYS LEFT ON THIS MEDICATION      PHARMACY:  [facility] [address] AT  [address] - [phone]  - [phone] FX      PT#[phone]

## 2020-12-07 DIAGNOSIS — M25.551 PAIN OF RIGHT HIP JOINT: ICD-10-CM

## 2020-12-07 NOTE — TELEPHONE ENCOUNTER
Caller: Macario Carpio    Relationship: Self    Best call back number: [phone]    Medication needed:   Requested Prescriptions     Pending Prescriptions Disp Refills   • oxyCODONE-acetaminophen (PERCOCET) 7.5-325 MG per tablet 90 tablet 0     Sig: Take 1 tablet by mouth Every 8 (Eight) Hours As Needed for Severe Pain .   • carvedilol (COREG) 6.25 MG tablet 60 tablet 2     Sig: Take 1 tablet by mouth 2 (Two) Times a Day.       When do you need the refill by: 12/7/20    What details did the patient provide when requesting the medication: n/a    Does the patient have less than a 3 day supply:  [] Yes  [x] No    What is the patient's preferred pharmacy:    [facility] [address] AT  [address] - [phone]  - [phone] FX  [phone]

## 2020-12-08 RX ORDER — OXYCODONE AND ACETAMINOPHEN 7.5; 325 MG/1; MG/1
1 TABLET ORAL EVERY 8 HOURS PRN
Qty: 90 TABLET | Refills: 0 | OUTPATIENT
Start: 2020-12-08

## 2020-12-08 RX ORDER — CARVEDILOL 6.25 MG/1
6.25 TABLET ORAL 2 TIMES DAILY
Qty: 180 TABLET | Refills: 1 | Status: SHIPPED | OUTPATIENT
Start: 2020-12-08 | End: 2021-02-08 | Stop reason: DRUGHIGH

## 2020-12-08 NOTE — TELEPHONE ENCOUNTER
Contract says pain secondary to the gout- sorry I should have stated that before. RX says for severe pain. Not sure if it makes a difference, but wanted to tell you .

## 2020-12-08 NOTE — TELEPHONE ENCOUNTER
Natalya spoke to patient about needing to see pain management.  Are you OK to place the referral and give him enough until he is seen by pain management.  Please advise

## 2020-12-08 NOTE — TELEPHONE ENCOUNTER
PT CALLED I EXPLAINED THE MESSAGE AND HE STATED THAT HE WOULD LIKE TO KNOW IF HE COULD AT LEAST GET ENOUGH FOR THE REST OF THIS MONTH OR UNTIL HE SEES PAIN MANAGEMENT. PT WOULD LIKE A CALL BACK TODAY IF POSSIBLE. PLEASE ADVISE

## 2020-12-08 NOTE — TELEPHONE ENCOUNTER
Let patient know that I do not do long tem narcotics  I have to send him to pain mangement  I can take care of his other issue but notoxycodone

## 2020-12-09 NOTE — TELEPHONE ENCOUNTER
Patient stated that someone else called him from our office and said that it would be sent in one more month and that was it.  I do not see anything documented and it was not sent in.  Do you know who maybe would have called?

## 2020-12-09 NOTE — TELEPHONE ENCOUNTER
[place] FRANKIE IS WANTING TO KNOW IF DR. CERVANTES WILL REFILL HIS MEDICATION FOR THE MONTH OF December, HE WILL BE OUT 12/10/2020    Macario Carpio ([facility]) [phone]            oxyCODONE-acetaminophen (PERCOCET) 7.5-325 MG per tablet  1 tablet, Every 8 Hours PRN 0 ordered         Summary: Take 1 tablet by mouth Every 8 (Eight) Hours As Needed for Severe Pain ., Starting Tue 11/10/2020, Normal        [facility] [address] - [address] [place] [address] AT  [address] - [phone]  - [phone] FX  [phone]

## 2020-12-09 NOTE — TELEPHONE ENCOUNTER
I spoke to the patient and after our conversation, he does also take it for chronic pain of the hip which appears to be documented.  He is still OK with pain management, but I informed him that I would need to discuss this with you.  He expressed understanding.

## 2020-12-10 NOTE — TELEPHONE ENCOUNTER
THE PATIENT CALLED IN ABOUT THE STATUS OF HIS OXYCODONE.    PLEASE ADVISE.    CALLBACK NUMBER [phone]

## 2020-12-10 NOTE — TELEPHONE ENCOUNTER
Patient has been advised Dr. Alegria is no longer with the practice and  feels he needs to see PM for the pain since this is a chronic issue. Patient will be seeking a new PCP outside of this location and was advised to go to UC or ER if he felt he needed medication.

## 2020-12-10 NOTE — TELEPHONE ENCOUNTER
Pt is very unhappy with his telemedicine appointment he had this afternoon. He wants to speak with an . Dr. Ferro informed him we could no longer give him oxycodone. He wants to file a complaint regarding Dr. Ferro and he also states he is needing his medication refilled until he can get in with pain management. He is also requesting to transfer his care to someone else in the practice.

## 2020-12-16 ENCOUNTER — OFFICE VISIT (OUTPATIENT)
Dept: FAMILY MEDICINE CLINIC | Facility: CLINIC | Age: 50
End: 2020-12-16

## 2020-12-16 VITALS
SYSTOLIC BLOOD PRESSURE: 124 MMHG | BODY MASS INDEX: 28.39 KG/M2 | DIASTOLIC BLOOD PRESSURE: 80 MMHG | HEART RATE: 109 BPM | TEMPERATURE: 97.5 F | WEIGHT: 209.6 LBS | HEIGHT: 72 IN | OXYGEN SATURATION: 97 %

## 2020-12-16 DIAGNOSIS — M25.551 PAIN OF RIGHT HIP JOINT: ICD-10-CM

## 2020-12-16 DIAGNOSIS — M16.11 ARTHRITIS OF RIGHT HIP: ICD-10-CM

## 2020-12-16 DIAGNOSIS — M1A.00X1 GOUTY ARTHROPATHY, CHRONIC, WITH TOPHI: ICD-10-CM

## 2020-12-16 DIAGNOSIS — M25.551 PAIN OF RIGHT HIP JOINT: Primary | ICD-10-CM

## 2020-12-16 DIAGNOSIS — Z79.891 CHRONIC USE OF OPIATE FOR THERAPEUTIC PURPOSE: ICD-10-CM

## 2020-12-16 DIAGNOSIS — D50.0 IRON DEFICIENCY ANEMIA DUE TO CHRONIC BLOOD LOSS: ICD-10-CM

## 2020-12-16 PROCEDURE — 99214 OFFICE O/P EST MOD 30 MIN: CPT | Performed by: NURSE PRACTITIONER

## 2020-12-16 RX ORDER — RIFAXIMIN 550 MG/1
TABLET ORAL
COMMUNITY
Start: 2020-10-06 | End: 2022-08-14 | Stop reason: HOSPADM

## 2020-12-16 RX ORDER — OXYCODONE AND ACETAMINOPHEN 7.5; 325 MG/1; MG/1
1 TABLET ORAL EVERY 8 HOURS PRN
Qty: 90 TABLET | Refills: 0 | OUTPATIENT
Start: 2020-12-16

## 2020-12-16 NOTE — PROGRESS NOTES
Subjective   Macario Carpio is a 50 y.o. male.     Chief Complaint   Patient presents with   • Establish Care   • Med Refill        History of Present Illness     Patient is here today to establish care as a new patient.   He was previous with Dr. Alegria on [address]. She left the practice so she moved closer to home.      GOUT-allopurinol. Reports gout pain all the time.  Uses Colchine daily. Reports he is in flair all the time. Uloric didn't work as well.  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  He has been on medication for 3 years.   States he has an appt with pain management  Has appt with [facility] pain management on 1/5/2021  For chronic hip pain.   Continued after hip surgery  Has been out for 3 days    The following portions of the patient's history were reviewed and updated as appropriate: allergies, current medications, past family history, past medical history, past social history, past surgical history and problem list.    Past Medical History:   Diagnosis Date   • Gout    • Hepatitis C        Past Surgical History:   Procedure Laterality Date   • TOTAL HIP ARTHROPLASTY Right 12/2020       Family History   Problem Relation Age of Onset   • Heart disease Mother    • Heart disease Father    • Diabetes Maternal Grandfather        Social History     Socioeconomic History   • Marital status: Single     Spouse name: Not on file   • Number of children: Not on file   • Years of education: Not on file   • Highest education level: Not on file   Tobacco Use   • Smoking status: Never Smoker   • Smokeless tobacco: Never Used   Substance and Sexual Activity   • Alcohol use: No     Frequency: Never   • Drug use: Never   • Sexual activity: Yes     Partners: Female     Comment: partners hysterectomy         Current Outpatient Medications:   •  allopurinol (ZYLOPRIM) 100 MG tablet, Take one tablet by mouth daily for 14 days, then  "increase to one tablet twice a day., Disp: 45 tablet, Rfl: 60  •  carvedilol (COREG) 6.25 MG tablet, Take 1 tablet by mouth 2 (Two) Times a Day., Disp: 180 tablet, Rfl: 1  •  colchicine 0.6 MG tablet, Take 1 tablet by mouth 2 (Two) Times a Day., Disp: 60 tablet, Rfl: 1  •  furosemide (LASIX) 40 MG tablet, Take 1 tablet by mouth Daily., Disp: 30 tablet, Rfl: 5  •  Misc. Devices (TRANSFER BENCH) misc, 1 each Daily. To use while showering., Disp: 1 each, Rfl: 0  •  oxyCODONE-acetaminophen (PERCOCET) 7.5-325 MG per tablet, Take 1 tablet by mouth Every 8 (Eight) Hours As Needed for Severe Pain ., Disp: 90 tablet, Rfl: 0  •  spironolactone (ALDACTONE) 50 MG tablet, Take 25 mg by mouth 2 (Two) Times a Day., Disp: , Rfl:   •  Xifaxan 550 MG tablet, , Disp: , Rfl:   •  mupirocin (BACTROBAN) 2 % ointment, , Disp: , Rfl:     Review of Systems   Constitutional: Negative for fatigue and fever.   Respiratory: Negative for cough, shortness of breath and wheezing.    Cardiovascular: Negative for chest pain.   Gastrointestinal: Negative for abdominal pain, constipation, diarrhea, nausea and vomiting.   Genitourinary: Negative for dysuria and urgency.   Musculoskeletal:        Chronic pain in joints and right hip.   Skin: Negative.    Neurological: Negative for dizziness and headache.   Psychiatric/Behavioral: Negative for suicidal ideas and depressed mood. The patient is not nervous/anxious.        Objective   Vitals:    12/16/20 1347   BP: 124/80   Pulse: 109   Temp: 97.5 °F (36.4 °C)   SpO2: 97%   Weight: 95.1 kg (209 lb 9.6 oz)   Height: 182.9 cm (72.01\")     Body mass index is 28.42 kg/m².  Physical Exam  Constitutional:       Appearance: Normal appearance.   Cardiovascular:      Rate and Rhythm: Normal rate and regular rhythm.      Pulses: Normal pulses.   Pulmonary:      Effort: Pulmonary effort is normal.      Breath sounds: Normal breath sounds.   Musculoskeletal:      Comments: Very large, approximate tennis ball size " tophus noted to bilateral elbows.   Approximately golf ball size noted to bilateral feet.   Neurological:      Mental Status: He is alert and oriented to person, place, and time.   Psychiatric:         Mood and Affect: Mood normal.         Behavior: Behavior normal.         Thought Content: Thought content normal.         Judgment: Judgment normal.           Assessment/Plan   Diagnoses and all orders for this visit:    1. Pain of right hip joint (Primary)    2. Chronic use of opiate for therapeutic purpose    3. Iron deficiency anemia due to chronic blood loss    4. Gouty arthropathy, chronic, with tophi    5. Arthritis of right hip      Cardiac issues-continue current medication    Chronic pain-we will give short-term Percocet after discussion with Dr. Beckett about this.  German was completed today.  Last urine drug screen was September 16.  He has appointment upcoming on January 4 with [facility] pain management.    Follow-up in 1 month for fasting labs and Medicare annual wellness.           There are no Patient Instructions on file for this visit.

## 2020-12-17 DIAGNOSIS — M25.551 PAIN OF RIGHT HIP JOINT: ICD-10-CM

## 2020-12-17 RX ORDER — OXYCODONE AND ACETAMINOPHEN 7.5; 325 MG/1; MG/1
1 TABLET ORAL EVERY 8 HOURS PRN
Qty: 63 TABLET | Refills: 0 | Status: SHIPPED | OUTPATIENT
Start: 2020-12-17 | End: 2021-02-08 | Stop reason: DRUGHIGH

## 2021-02-08 ENCOUNTER — OFFICE VISIT (OUTPATIENT)
Dept: FAMILY MEDICINE CLINIC | Facility: CLINIC | Age: 51
End: 2021-02-08

## 2021-02-08 VITALS
HEART RATE: 115 BPM | WEIGHT: 205.6 LBS | SYSTOLIC BLOOD PRESSURE: 132 MMHG | HEIGHT: 72 IN | BODY MASS INDEX: 27.85 KG/M2 | OXYGEN SATURATION: 98 % | TEMPERATURE: 95.7 F | DIASTOLIC BLOOD PRESSURE: 82 MMHG

## 2021-02-08 DIAGNOSIS — I71.40 ABDOMINAL AORTIC ANEURYSM (AAA) WITHOUT RUPTURE (HCC): Primary | ICD-10-CM

## 2021-02-08 PROBLEM — E78.00 HYPERCHOLESTEROLEMIA: Status: ACTIVE | Noted: 2021-02-08

## 2021-02-08 PROBLEM — K74.60 HEPATIC CIRRHOSIS: Status: ACTIVE | Noted: 2021-02-08

## 2021-02-08 PROBLEM — M10.9 GOUT: Status: ACTIVE | Noted: 2021-02-08

## 2021-02-08 PROBLEM — B19.20 HEPATITIS C VIRUS INFECTION: Status: ACTIVE | Noted: 2021-02-08

## 2021-02-08 PROCEDURE — 99213 OFFICE O/P EST LOW 20 MIN: CPT | Performed by: NURSE PRACTITIONER

## 2021-02-08 RX ORDER — LOSARTAN POTASSIUM 25 MG/1
25 TABLET ORAL
COMMUNITY
Start: 2021-02-05 | End: 2021-04-29 | Stop reason: SDUPTHER

## 2021-02-08 RX ORDER — CARVEDILOL 25 MG/1
25 TABLET ORAL
COMMUNITY
Start: 2021-02-05 | End: 2021-04-28 | Stop reason: SDUPTHER

## 2021-02-08 RX ORDER — CALCIUM CARBONATE/VITAMIN D3 500-10/5ML
400 LIQUID (ML) ORAL
COMMUNITY
Start: 2021-02-05 | End: 2021-06-03 | Stop reason: SDUPTHER

## 2021-02-08 RX ORDER — OXYCODONE HYDROCHLORIDE 10 MG/1
10 TABLET ORAL
Status: ON HOLD | COMMUNITY
Start: 2021-02-05 | End: 2022-08-13 | Stop reason: SDUPTHER

## 2021-02-08 RX ORDER — ATORVASTATIN CALCIUM 80 MG/1
80 TABLET, FILM COATED ORAL
COMMUNITY
Start: 2021-02-05 | End: 2021-04-29 | Stop reason: SDUPTHER

## 2021-02-08 RX ORDER — AMLODIPINE BESYLATE 5 MG/1
5 TABLET ORAL
COMMUNITY
Start: 2021-02-05 | End: 2021-04-29 | Stop reason: SDUPTHER

## 2021-02-08 RX ORDER — PANTOPRAZOLE SODIUM 40 MG/1
40 TABLET, DELAYED RELEASE ORAL
COMMUNITY
Start: 2021-02-05 | End: 2021-04-14 | Stop reason: SDUPTHER

## 2021-02-08 NOTE — PROGRESS NOTES
"Chief Complaint  Hospital Follow Up Visit (last week for chest pain)    Subjective          Macario Carpio presents to [facility] for   History of Present Illness    Patient is here today for hospital follow up.   He has gotten in to see cardiology after his hospital admission.       His cardiologist recommended that he get oxygen.  He sent them to me to order.    He hasn't had 6 min walk.       They also wanted him to discuss with me to get a handicap sticker.         Went to pain management today.       Has follow up with Cardiology on 3/10/2021    He hasn't ever required oxygen previously.       Started potassium  Increased coreg    Objective   Vital Signs:   /82   Pulse 115   Temp 95.7 °F (35.4 °C)   Ht 182.9 cm (72\")   Wt 93.3 kg (205 lb 9.6 oz)   SpO2 98%   BMI 27.88 kg/m²     Physical Exam  Constitutional:       Appearance: Normal appearance.   Cardiovascular:      Rate and Rhythm: Normal rate and regular rhythm.      Pulses: Normal pulses.   Pulmonary:      Effort: Pulmonary effort is normal.      Breath sounds: Normal breath sounds.   Neurological:      Mental Status: He is alert and oriented to person, place, and time.   Psychiatric:         Mood and Affect: Mood normal.         Behavior: Behavior normal.         Thought Content: Thought content normal.         Judgment: Judgment normal.        Result Review :                 Assessment and Plan    Problem List Items Addressed This Visit        Cardiac and Vasculature    Abdominal aortic aneurysm (AAA) (CMS/HCC) - Primary          Patient does not qualify for oxygen based on his 6-minute walk.  We will order an overnight sleep study for further evaluation.    I will fill out handicap sticker once he gets the paperwork to me.  Continue current medication and follow-up with cardiologist.  If any worsening chest pain notify cardiologist or go to the ER.  Patient verbalizes understanding.      Follow Up   No " follow-ups on file.  Patient was given instructions and counseling regarding his condition or for health maintenance advice. Please see specific information pulled into the AVS if appropriate.

## 2021-02-08 NOTE — PATIENT INSTRUCTIONS
Cardiology- Dr Felipe Gaffney  3/10/2021   Felipe Gaffney MD   Cardiology   NPI: [id number]   [address] [place] [address] [address] [place] [id number]       Phone: [phone]   Fax: [phone]      [facility]  Fax number [phone]

## 2021-02-19 ENCOUNTER — TELEPHONE (OUTPATIENT)
Dept: FAMILY MEDICINE CLINIC | Facility: CLINIC | Age: 51
End: 2021-02-19

## 2021-02-19 NOTE — TELEPHONE ENCOUNTER
Caller: Macario Carpio    Relationship: Self    Best call back number: [phone]    What test was performed: OXYGEN TEST    Additional notes: PLEASE CALL BACK WITH RESULTS. PT DROPPED OFF WRIST BAND THIS MORNING.

## 2021-04-14 RX ORDER — PANTOPRAZOLE SODIUM 40 MG/1
40 TABLET, DELAYED RELEASE ORAL DAILY
Qty: 90 TABLET | Refills: 1 | Status: SHIPPED | OUTPATIENT
Start: 2021-04-14 | End: 2022-03-03 | Stop reason: SDUPTHER

## 2021-04-14 NOTE — TELEPHONE ENCOUNTER
Caller: Macario Carpio    Relationship: Self    Best call back number: [phone]    Medication needed:   Requested Prescriptions     Pending Prescriptions Disp Refills   • pantoprazole (PROTONIX) 40 MG EC tablet       Si tablet.       When do you need the refill by: 21    What additional details did the patient provide when requesting the medication: PATIENT IS OUT OF MEDICATION     Does the patient have less than a 3 day supply:  [x] Yes  [] No    What is the patient's preferred pharmacy:    [facility] [address] & [facility] [address] - [phone]  - [phone]   [phone]

## 2021-04-28 ENCOUNTER — TELEPHONE (OUTPATIENT)
Dept: FAMILY MEDICINE CLINIC | Facility: CLINIC | Age: 51
End: 2021-04-28

## 2021-04-28 RX ORDER — CARVEDILOL 25 MG/1
25 TABLET ORAL 2 TIMES DAILY WITH MEALS
Qty: 60 TABLET | Refills: 2 | Status: SHIPPED | OUTPATIENT
Start: 2021-04-28 | End: 2021-04-29 | Stop reason: SDUPTHER

## 2021-04-28 RX ORDER — MENTHOL 40 MG/ML
1 GEL TOPICAL 4 TIMES DAILY PRN
Qty: 118 ML | Refills: 2 | Status: SHIPPED | OUTPATIENT
Start: 2021-04-28 | End: 2022-03-03 | Stop reason: SDUPTHER

## 2021-04-28 NOTE — TELEPHONE ENCOUNTER
Caller: Macario Carpio    Relationship: Self    Best call back number: [phone]    Medication needed:   Requested Prescriptions     Pending Prescriptions Disp Refills   • carvedilol (COREG) 25 MG tablet       Si tablet.       When do you need the refill by: ASAP    What additional details did the patient provide when requesting the medication: PATIENT WOULD ALSO LIKE TO GET BIOFREEZE ROLLON, IF HE COULD.     Does the patient have less than a 3 day supply:  [x] Yes  [] No    What is the patient's preferred pharmacy: [address] AT  [address] - [phone]  - [phone] FX

## 2021-04-28 NOTE — TELEPHONE ENCOUNTER
Caller: Macario Carpio    Relationship to patient: Self    Best call back number: [phone]    Patient is needing: PATIENT IS NEEDING A PRESCRIPTION TO GET A SCOOTER. SENT TO [facility]. PLEASE REACH OUT TO PATIENT WITH ANY QUESTIONS

## 2021-04-29 ENCOUNTER — TELEPHONE (OUTPATIENT)
Dept: FAMILY MEDICINE CLINIC | Facility: CLINIC | Age: 51
End: 2021-04-29

## 2021-04-29 ENCOUNTER — OFFICE VISIT (OUTPATIENT)
Dept: FAMILY MEDICINE CLINIC | Facility: CLINIC | Age: 51
End: 2021-04-29

## 2021-04-29 VITALS
HEART RATE: 95 BPM | SYSTOLIC BLOOD PRESSURE: 124 MMHG | OXYGEN SATURATION: 98 % | BODY MASS INDEX: 26.63 KG/M2 | HEIGHT: 72 IN | WEIGHT: 196.6 LBS | DIASTOLIC BLOOD PRESSURE: 80 MMHG | TEMPERATURE: 96.7 F

## 2021-04-29 DIAGNOSIS — M1A.00X1 GOUTY ARTHROPATHY, CHRONIC, WITH TOPHI: ICD-10-CM

## 2021-04-29 DIAGNOSIS — K70.30 ALCOHOLIC CIRRHOSIS OF LIVER WITHOUT ASCITES (HCC): ICD-10-CM

## 2021-04-29 DIAGNOSIS — Z76.89 ENCOUNTER FOR WHEELCHAIR ASSESSMENT: ICD-10-CM

## 2021-04-29 DIAGNOSIS — F32.2 CURRENT SEVERE EPISODE OF MAJOR DEPRESSIVE DISORDER WITHOUT PSYCHOTIC FEATURES, UNSPECIFIED WHETHER RECURRENT (HCC): ICD-10-CM

## 2021-04-29 DIAGNOSIS — R53.82 CHRONIC FATIGUE: Primary | ICD-10-CM

## 2021-04-29 DIAGNOSIS — D64.9 ANEMIA, UNSPECIFIED TYPE: ICD-10-CM

## 2021-04-29 PROBLEM — Q23.81 BICUSPID AORTIC VALVE: Status: ACTIVE | Noted: 2021-03-04

## 2021-04-29 PROBLEM — I71.20 THORACIC AORTIC ANEURYSM WITHOUT RUPTURE: Status: ACTIVE | Noted: 2021-03-04

## 2021-04-29 PROBLEM — I10 HYPERTENSIVE DISORDER: Status: ACTIVE | Noted: 2021-03-04

## 2021-04-29 PROBLEM — I35.1 AORTIC VALVE REGURGITATION: Status: ACTIVE | Noted: 2021-03-04

## 2021-04-29 PROBLEM — F41.9 ANXIETY: Status: ACTIVE | Noted: 2021-03-04

## 2021-04-29 PROBLEM — M16.11 ARTHRITIS OF RIGHT HIP: Status: ACTIVE | Noted: 2019-11-22

## 2021-04-29 PROBLEM — Q23.1 BICUSPID AORTIC VALVE: Status: ACTIVE | Noted: 2021-03-04

## 2021-04-29 PROBLEM — M25.559 HIP PAIN: Status: ACTIVE | Noted: 2021-04-29

## 2021-04-29 PROBLEM — G89.29 CHRONIC PAIN: Status: ACTIVE | Noted: 2021-03-04

## 2021-04-29 PROCEDURE — G2212 PROLONG OUTPT/OFFICE VIS: HCPCS | Performed by: NURSE PRACTITIONER

## 2021-04-29 PROCEDURE — 36415 COLL VENOUS BLD VENIPUNCTURE: CPT | Performed by: NURSE PRACTITIONER

## 2021-04-29 PROCEDURE — 99215 OFFICE O/P EST HI 40 MIN: CPT | Performed by: NURSE PRACTITIONER

## 2021-04-29 RX ORDER — ATORVASTATIN CALCIUM 80 MG/1
80 TABLET, FILM COATED ORAL DAILY
Qty: 90 TABLET | Refills: 1 | Status: SHIPPED | OUTPATIENT
Start: 2021-04-29 | End: 2022-03-16 | Stop reason: SDUPTHER

## 2021-04-29 RX ORDER — AMLODIPINE BESYLATE 5 MG/1
5 TABLET ORAL DAILY
Qty: 90 TABLET | Refills: 1 | Status: SHIPPED | OUTPATIENT
Start: 2021-04-29 | End: 2021-12-07

## 2021-04-29 RX ORDER — VILAZODONE HYDROCHLORIDE 10 MG/1
10 TABLET ORAL DAILY
Qty: 30 TABLET | Refills: 2 | Status: SHIPPED | OUTPATIENT
Start: 2021-04-29 | End: 2021-04-30

## 2021-04-29 RX ORDER — COLCHICINE 0.6 MG/1
0.6 TABLET ORAL 2 TIMES DAILY
Qty: 180 TABLET | Refills: 1 | Status: SHIPPED | OUTPATIENT
Start: 2021-04-29 | End: 2022-08-14 | Stop reason: HOSPADM

## 2021-04-29 RX ORDER — FUROSEMIDE 40 MG/1
40 TABLET ORAL DAILY
Qty: 90 TABLET | Refills: 1 | Status: ON HOLD | OUTPATIENT
Start: 2021-04-29 | End: 2022-08-13 | Stop reason: SDUPTHER

## 2021-04-29 RX ORDER — LOSARTAN POTASSIUM 25 MG/1
25 TABLET ORAL DAILY
Qty: 90 TABLET | Refills: 1 | Status: SHIPPED | OUTPATIENT
Start: 2021-04-29 | End: 2021-07-08 | Stop reason: SDUPTHER

## 2021-04-29 RX ORDER — CARVEDILOL 25 MG/1
25 TABLET ORAL 2 TIMES DAILY WITH MEALS
Qty: 180 TABLET | Refills: 1 | Status: SHIPPED | OUTPATIENT
Start: 2021-04-29 | End: 2022-04-22

## 2021-04-29 RX ORDER — ALLOPURINOL 100 MG/1
TABLET ORAL
Qty: 180 TABLET | Refills: 1 | Status: SHIPPED | OUTPATIENT
Start: 2021-04-29

## 2021-04-29 NOTE — PROGRESS NOTES
"Chief Complaint  Evaluation for Scooter, Med Refill, and Fatigue    Subjective          Macario Carpio presents to [facility]  History of Present Illness    Patient is here today for evaluation for scooter.  He states he has a wheelchair at home, but his gout flairs so bad it is hard to push sometimes.    He also has a cane and walker at home.  He also has a transfer bench.     He can still ambulate and tries to as much as he can when he is having his better days, but some days he is too fatigued and his pain is too bad and he has to use wheelchair.  He is alert and oriented. He has the ability to use a power wheelchair with dexterity. The manual wheelchair is just hard to push with his gout with the tophi on his fingers and elbows.       He has a new complaint of fatigue today.  He reports that he has been feeling like this for the last month or so.      He reports that he does have trouble with his mood and anxiety at time.  He states that he has trouble with going through times when he gets down.  He likes to be active.  He states that he was on xanax for years for this.     He states that he felt like it settled him down a little bit and made him feel like he could enjoy himself.   He cannot remember what all else he tried, he just said he \"probably\" tried other things.    phq 9 score of 19   Migel 7 score of 6  Mood questionnaire is negative    Objective   Vital Signs:   /80   Pulse 95   Temp 96.7 °F (35.9 °C)   Ht 182.9 cm (72\")   Wt 89.2 kg (196 lb 9.6 oz)   SpO2 98%   BMI 26.66 kg/m²     Physical Exam  Constitutional:       Appearance: He is ill-appearing (walking with cane).   Cardiovascular:      Rate and Rhythm: Normal rate and regular rhythm.      Pulses: Normal pulses.   Pulmonary:      Effort: Pulmonary effort is normal.      Breath sounds: Normal breath sounds.   Neurological:      Mental Status: He is alert and oriented to person, place, and time. "   Psychiatric:         Mood and Affect: Mood is anxious and depressed. Affect is tearful.         Speech: Speech normal.         Behavior: Behavior normal. Behavior is cooperative.         Thought Content: Thought content normal.         Cognition and Memory: Cognition normal.         Judgment: Judgment normal.        Result Review :                 Assessment and Plan    Diagnoses and all orders for this visit:    1. Chronic fatigue (Primary)  -     CBC & Differential  -     Comprehensive Metabolic Panel  -     TSH  -     Vitamin B12  -     Vitamin D 25 Hydroxy  -     Magnesium  -     Testosterone  -     Phosphorus  -     CK  -     Uric acid  -     Sedimentation rate, automated  -     Ammonia    2. Gouty arthropathy, chronic, with tophi  -     CBC & Differential  -     Comprehensive Metabolic Panel  -     TSH  -     Vitamin B12  -     Vitamin D 25 Hydroxy  -     Magnesium  -     Testosterone  -     Phosphorus  -     CK  -     Uric acid  -     Sedimentation rate, automated  -     Ammonia    3. Anemia, unspecified type  -     CBC & Differential  -     Comprehensive Metabolic Panel  -     TSH  -     Vitamin B12  -     Vitamin D 25 Hydroxy  -     Magnesium  -     Testosterone  -     Phosphorus  -     CK  -     Uric acid  -     Sedimentation rate, automated  -     Ammonia    4. Alcoholic cirrhosis of liver without ascites (CMS/HCC)  -     CBC & Differential  -     Comprehensive Metabolic Panel  -     TSH  -     Vitamin B12  -     Vitamin D 25 Hydroxy  -     Magnesium  -     Testosterone  -     Phosphorus  -     CK  -     Uric acid  -     Sedimentation rate, automated  -     Ammonia    5. Encounter for wheelchair assessment    6. Current severe episode of major depressive disorder without psychotic features, unspecified whether recurrent (CMS/HCC)    Other orders  -     vilazodone (Viibryd) 10 MG tablet tablet; Take 1 tablet by mouth Daily.  Dispense: 30 tablet; Refill: 2  -     amLODIPine (NORVASC) 5 MG tablet; Take 1  tablet by mouth Daily.  Dispense: 90 tablet; Refill: 1  -     atorvastatin (LIPITOR) 80 MG tablet; Take 1 tablet by mouth Daily.  Dispense: 90 tablet; Refill: 1  -     allopurinol (ZYLOPRIM) 100 MG tablet; Take 1 tablet by mouth twice daily  Dispense: 180 tablet; Refill: 1  -     carvedilol (COREG) 25 MG tablet; Take 1 tablet by mouth 2 (Two) Times a Day With Meals.  Dispense: 180 tablet; Refill: 1  -     colchicine 0.6 MG tablet; Take 1 tablet by mouth 2 (Two) Times a Day.  Dispense: 180 tablet; Refill: 1  -     furosemide (LASIX) 40 MG tablet; Take 1 tablet by mouth Daily.  Dispense: 90 tablet; Refill: 1  -     losartan (COZAAR) 25 MG tablet; Take 1 tablet by mouth Daily.  Dispense: 90 tablet; Refill: 1      Wheelchair assessment:  Why does he need the mechanical wheelchair?  Patient evaluated for mechanical wheelchair and due to his tophi from his gout and pain in joints from gout he needs wheelchair and the use of mechanical one for mobility in and out of home. Although on patient's good days he is still ambulatory with cane or walker, he does use wheelchair already most days. It is becoming increasing hard to push wheelchair with pain. He is in pain management for his pain from his gout.     Please see above answer for answer to question for why he cannot use a walker or cane.      Fatigue: we discussed that fatigue is a general symptom and could be attributed to his severe disease processes, electrolyte imbalance, depression, anxiety, or another trigger.  We will work up labs and due to severe phq 9 treat mood today and then work up based on results and response to medication.   Patient verbalizes understanding.      Gout: continue to see pain specialty.  We discussed trial of pain cream from specialty pharmacy and patient would like to try this.  Will order.      Depression:  I discussed with patient that xanax is not a recommended treatment for his symptoms, and also not recommended with his oxycodone.  He  is agreeable to trial.  Since he reports he trialed several things, but cannot remember we will trial a newer SSRI. If any worsening mood, notify provider.  If any SI or HI   Follow up in 1 month for medication management.        Time spent with patient was 80 minutes.     Follow Up   Return in about 4 weeks (around 5/27/2021) for Next scheduled follow up.  Patient was given instructions and counseling regarding his condition or for health maintenance advice. Please see specific information pulled into the AVS if appropriate.

## 2021-04-29 NOTE — TELEPHONE ENCOUNTER
----- Message from CALLUM Pena sent at 4/29/2021  1:02 PM EDT -----  Please fax order and information to [facility] for wheelchair.  Thanks!

## 2021-04-30 ENCOUNTER — TELEPHONE (OUTPATIENT)
Dept: FAMILY MEDICINE CLINIC | Facility: CLINIC | Age: 51
End: 2021-04-30

## 2021-04-30 LAB
25(OH)D3+25(OH)D2 SERPL-MCNC: 9.5 NG/ML (ref 30–100)
ALBUMIN SERPL-MCNC: 4.2 G/DL (ref 4–5)
ALBUMIN/GLOB SERPL: 1.4 {RATIO} (ref 1.2–2.2)
ALP SERPL-CCNC: 118 IU/L (ref 39–117)
ALT SERPL-CCNC: 17 IU/L (ref 0–44)
AMMONIA PLAS-MCNC: 209 UG/DL (ref 40–200)
AST SERPL-CCNC: 42 IU/L (ref 0–40)
BASOPHILS # BLD AUTO: 0.1 X10E3/UL (ref 0–0.2)
BASOPHILS NFR BLD AUTO: 1 %
BILIRUB SERPL-MCNC: 1.3 MG/DL (ref 0–1.2)
BUN SERPL-MCNC: 11 MG/DL (ref 6–24)
BUN/CREAT SERPL: 13 (ref 9–20)
CALCIUM SERPL-MCNC: 9.9 MG/DL (ref 8.7–10.2)
CHLORIDE SERPL-SCNC: 92 MMOL/L (ref 96–106)
CK SERPL-CCNC: 100 U/L (ref 49–439)
CO2 SERPL-SCNC: 32 MMOL/L (ref 20–29)
CREAT SERPL-MCNC: 0.86 MG/DL (ref 0.76–1.27)
EOSINOPHIL # BLD AUTO: 0.1 X10E3/UL (ref 0–0.4)
EOSINOPHIL NFR BLD AUTO: 2 %
ERYTHROCYTE [DISTWIDTH] IN BLOOD BY AUTOMATED COUNT: 14 % (ref 11.6–15.4)
ERYTHROCYTE [SEDIMENTATION RATE] IN BLOOD BY WESTERGREN METHOD: 27 MM/HR (ref 0–30)
GLOBULIN SER CALC-MCNC: 3 G/DL (ref 1.5–4.5)
GLUCOSE SERPL-MCNC: 146 MG/DL (ref 65–99)
HCT VFR BLD AUTO: 43.3 % (ref 37.5–51)
HGB BLD-MCNC: 15.3 G/DL (ref 13–17.7)
IMM GRANULOCYTES # BLD AUTO: 0 X10E3/UL (ref 0–0.1)
IMM GRANULOCYTES NFR BLD AUTO: 0 %
LYMPHOCYTES # BLD AUTO: 1.3 X10E3/UL (ref 0.7–3.1)
LYMPHOCYTES NFR BLD AUTO: 26 %
MAGNESIUM SERPL-MCNC: 1.5 MG/DL (ref 1.6–2.3)
MCH RBC QN AUTO: 33.6 PG (ref 26.6–33)
MCHC RBC AUTO-ENTMCNC: 35.3 G/DL (ref 31.5–35.7)
MCV RBC AUTO: 95 FL (ref 79–97)
MONOCYTES # BLD AUTO: 0.5 X10E3/UL (ref 0.1–0.9)
MONOCYTES NFR BLD AUTO: 10 %
NEUTROPHILS # BLD AUTO: 3.1 X10E3/UL (ref 1.4–7)
NEUTROPHILS NFR BLD AUTO: 61 %
PHOSPHATE SERPL-MCNC: 3.7 MG/DL (ref 2.8–4.1)
PLATELET # BLD AUTO: 109 X10E3/UL (ref 150–450)
POTASSIUM SERPL-SCNC: 2.5 MMOL/L (ref 3.5–5.2)
PROT SERPL-MCNC: 7.2 G/DL (ref 6–8.5)
RBC # BLD AUTO: 4.56 X10E6/UL (ref 4.14–5.8)
SODIUM SERPL-SCNC: 143 MMOL/L (ref 134–144)
TESTOST SERPL-MCNC: 541 NG/DL (ref 264–916)
TSH SERPL DL<=0.005 MIU/L-ACNC: 3.73 UIU/ML (ref 0.45–4.5)
URATE SERPL-MCNC: 10.3 MG/DL (ref 3.8–8.4)
VIT B12 SERPL-MCNC: 1981 PG/ML (ref 232–1245)
WBC # BLD AUTO: 5 X10E3/UL (ref 3.4–10.8)

## 2021-04-30 RX ORDER — CITALOPRAM 10 MG/1
10 TABLET ORAL DAILY
Qty: 30 TABLET | Refills: 2 | Status: SHIPPED | OUTPATIENT
Start: 2021-04-30 | End: 2021-07-29

## 2021-04-30 NOTE — TELEPHONE ENCOUNTER
Please let him know that his insurance will not cover this one.  I will send him in one they are requiring.  If he has any issues, let us know.  Keep 1 month follow up as discussed.

## 2021-04-30 NOTE — TELEPHONE ENCOUNTER
pts PA for VIIBRYD  Was denied.  Pt must trial and fail  FLUOXETINE  CITALOPRAM  SERTALINE and one of the below  Bupropion   Mirtazapine  Please advise

## 2021-05-03 ENCOUNTER — TELEPHONE (OUTPATIENT)
Dept: FAMILY MEDICINE CLINIC | Facility: CLINIC | Age: 51
End: 2021-05-03

## 2021-05-03 NOTE — TELEPHONE ENCOUNTER
Called [facility] and spoke with onel she asked if this could be substitued for the 2a box on the script so his insurance will cover it, please advise

## 2021-05-03 NOTE — TELEPHONE ENCOUNTER
Gave a verbal order to Kimberley at [facility] after talking to provider that it was ok to change cream to the 2a cream.

## 2021-05-03 NOTE — TELEPHONE ENCOUNTER
.      Caller: RY    Relationship: PHARMACIST    Best call back number:[phone]    Medication needed: CLARIFICATION OF RX FOR NEUROPATHIC PAIN CREAM      When do you need the refill by: 05/03/21    What additional details did the patient provide when requesting the medication: NEUROPATHIC PAIN CREAM    Does the patient have less than a 3 day supply:  [x] Yes  [] No    What is the patient's preferred pharmacy:    [facility]    PLEASE ADVISE.

## 2021-05-05 ENCOUNTER — TELEPHONE (OUTPATIENT)
Dept: PEDIATRICS | Facility: OTHER | Age: 51
End: 2021-05-05

## 2021-05-05 NOTE — TELEPHONE ENCOUNTER
Caller: JOE    Relationship to patient: Other    Best call back number:[phone]     Patient is needing: JOE WITH [employer] IS CALLING TO CHECK THE STATUS OF AN APPEAL FOR THE FOLLOWING MEDICATION.  SHE STATES SHE HAS SENT OVER INFORMATION TO APPEAL THE DENIAL FOR THE MEDICATION AND SHE IS CHECKING TO SEE IF MS MAGAÑA RECEIVED THAT PAPERWORK OR HAS ANY QUESTIONS.     vilazodone (Viibryd) 10 MG tablet tablet      PLEASE ADVISE.

## 2021-05-25 ENCOUNTER — TELEPHONE (OUTPATIENT)
Dept: FAMILY MEDICINE CLINIC | Facility: CLINIC | Age: 51
End: 2021-05-25

## 2021-05-25 NOTE — TELEPHONE ENCOUNTER
Caller: Macario Carpio    Relationship to patient: Self    Best call back number: [phone]    Patient is needing: PATIENT IS NEEDING A LACTULOSE 10 GM /15ML SOLUTION  THIS DOES NOT SHOW IN HIS CHART. PLEASE CALL HIM WITH QUESTIONS OR LET HIM KNOW IF THIS IS SOMETHING WE CAN FILL

## 2021-05-25 NOTE — TELEPHONE ENCOUNTER
I called the pt he said this was prescribed to him for his liver at the hospital. He said he takes it twice a day.

## 2021-05-26 RX ORDER — LACTULOSE 10 G/15ML
20 SOLUTION ORAL 2 TIMES DAILY
Qty: 1800 ML | Refills: 0 | Status: SHIPPED | OUTPATIENT
Start: 2021-05-26 | End: 2022-08-14 | Stop reason: HOSPADM

## 2021-05-26 NOTE — TELEPHONE ENCOUNTER
Patient needs to have hospital follow up.  Per hospital discharge.  Will give 1 refills, but no more until seen.   04/30/2021 15:57:04    With: JORDAN MAGAÑA NP-[facility]  Address:   [phone]    When: 2 to 3 days  Comments: Call for follow up appointment; obtain new labs on Monday, May 3, 2021.

## 2021-06-02 ENCOUNTER — OFFICE VISIT (OUTPATIENT)
Dept: FAMILY MEDICINE CLINIC | Facility: CLINIC | Age: 51
End: 2021-06-02

## 2021-06-02 VITALS
SYSTOLIC BLOOD PRESSURE: 128 MMHG | HEIGHT: 72 IN | DIASTOLIC BLOOD PRESSURE: 84 MMHG | HEART RATE: 61 BPM | TEMPERATURE: 97.7 F | WEIGHT: 201.2 LBS | OXYGEN SATURATION: 99 % | BODY MASS INDEX: 27.25 KG/M2

## 2021-06-02 DIAGNOSIS — M1A.00X1 GOUTY ARTHROPATHY, CHRONIC, WITH TOPHI: ICD-10-CM

## 2021-06-02 DIAGNOSIS — K70.30 ALCOHOLIC CIRRHOSIS OF LIVER WITHOUT ASCITES (HCC): ICD-10-CM

## 2021-06-02 DIAGNOSIS — D64.9 ANEMIA, UNSPECIFIED TYPE: ICD-10-CM

## 2021-06-02 DIAGNOSIS — R53.82 CHRONIC FATIGUE: Primary | ICD-10-CM

## 2021-06-02 PROCEDURE — 99214 OFFICE O/P EST MOD 30 MIN: CPT | Performed by: NURSE PRACTITIONER

## 2021-06-02 RX ORDER — ZINC GLUCONATE 50 MG
TABLET ORAL
COMMUNITY
Start: 2021-03-01

## 2021-06-02 NOTE — PROGRESS NOTES
"Chief Complaint  Hepatic Disease    Subjective          Macario Carpio presents to [facility]  History of Present Illness     Patient is here today for follow up on hepatic disease.  He also had asymptomatic COVID while he was in the hospital.       Feels like celebrex is helping.  Feels like topical cream is helping.   With pain.    Patient ammonia level and potassium was very out of  Range at last visit.        Objective   Vital Signs:   /84   Pulse 61   Temp 97.7 °F (36.5 °C)   Ht 182.9 cm (72\")   Wt 91.3 kg (201 lb 3.2 oz)   SpO2 99%   BMI 27.29 kg/m²     Physical Exam  Constitutional:       Appearance: Normal appearance.   Cardiovascular:      Rate and Rhythm: Normal rate and regular rhythm.      Pulses: Normal pulses.   Pulmonary:      Effort: Pulmonary effort is normal.      Breath sounds: Normal breath sounds.   Neurological:      Mental Status: He is alert and oriented to person, place, and time.   Psychiatric:         Mood and Affect: Mood normal.         Behavior: Behavior normal.         Thought Content: Thought content normal.         Judgment: Judgment normal.        Result Review :                 Assessment and Plan    Diagnoses and all orders for this visit:    1. Chronic fatigue (Primary)  -     CBC & Differential  -     Comprehensive Metabolic Panel  -     TSH  -     Ammonia  -     Uric acid  -     Magnesium    2. Alcoholic cirrhosis of liver without ascites (CMS/HCC)  -     CBC & Differential  -     Comprehensive Metabolic Panel  -     TSH  -     Ammonia  -     Uric acid  -     Magnesium    3. Anemia, unspecified type  -     CBC & Differential  -     Comprehensive Metabolic Panel  -     TSH  -     Ammonia  -     Uric acid  -     Magnesium    4. Gouty arthropathy, chronic, with tophi  -     CBC & Differential  -     Comprehensive Metabolic Panel  -     TSH  -     Ammonia  -     Uric acid  -     Magnesium      We will recheck labs today and call with " results.  I did discuss with patient the importance of keeping medication at current dose to help control symptoms.  Verbalized understanding.  I discussed the importance of follow-ups at regular intervals.  He verbalizes understanding of this.  We did discuss that Covid could have possibly affected labs as well.       Follow Up   No follow-ups on file.  Patient was given instructions and counseling regarding his condition or for health maintenance advice. Please see specific information pulled into the AVS if appropriate.

## 2021-06-03 LAB
ALBUMIN SERPL-MCNC: 4.3 G/DL (ref 4–5)
ALBUMIN/GLOB SERPL: 1.6 {RATIO} (ref 1.2–2.2)
ALP SERPL-CCNC: 105 IU/L (ref 48–121)
ALT SERPL-CCNC: 25 IU/L (ref 0–44)
AMMONIA PLAS-MCNC: 92 UG/DL (ref 40–200)
AST SERPL-CCNC: 58 IU/L (ref 0–40)
BASOPHILS # BLD AUTO: 0.1 X10E3/UL (ref 0–0.2)
BASOPHILS NFR BLD AUTO: 1 %
BILIRUB SERPL-MCNC: 2.4 MG/DL (ref 0–1.2)
BUN SERPL-MCNC: 13 MG/DL (ref 6–24)
BUN/CREAT SERPL: 15 (ref 9–20)
CALCIUM SERPL-MCNC: 8.8 MG/DL (ref 8.7–10.2)
CHLORIDE SERPL-SCNC: 98 MMOL/L (ref 96–106)
CO2 SERPL-SCNC: 21 MMOL/L (ref 20–29)
CREAT SERPL-MCNC: 0.86 MG/DL (ref 0.76–1.27)
EOSINOPHIL # BLD AUTO: 0.2 X10E3/UL (ref 0–0.4)
EOSINOPHIL NFR BLD AUTO: 3 %
ERYTHROCYTE [DISTWIDTH] IN BLOOD BY AUTOMATED COUNT: 12.9 % (ref 11.6–15.4)
GLOBULIN SER CALC-MCNC: 2.7 G/DL (ref 1.5–4.5)
GLUCOSE SERPL-MCNC: 109 MG/DL (ref 65–99)
HCT VFR BLD AUTO: 41.6 % (ref 37.5–51)
HGB BLD-MCNC: 14.5 G/DL (ref 13–17.7)
IMM GRANULOCYTES # BLD AUTO: 0 X10E3/UL (ref 0–0.1)
IMM GRANULOCYTES NFR BLD AUTO: 0 %
LYMPHOCYTES # BLD AUTO: 1.5 X10E3/UL (ref 0.7–3.1)
LYMPHOCYTES NFR BLD AUTO: 28 %
MAGNESIUM SERPL-MCNC: 1.5 MG/DL (ref 1.6–2.3)
MCH RBC QN AUTO: 32.3 PG (ref 26.6–33)
MCHC RBC AUTO-ENTMCNC: 34.9 G/DL (ref 31.5–35.7)
MCV RBC AUTO: 93 FL (ref 79–97)
MONOCYTES # BLD AUTO: 0.4 X10E3/UL (ref 0.1–0.9)
MONOCYTES NFR BLD AUTO: 9 %
NEUTROPHILS # BLD AUTO: 3.1 X10E3/UL (ref 1.4–7)
NEUTROPHILS NFR BLD AUTO: 59 %
PLATELET # BLD AUTO: 127 X10E3/UL (ref 150–450)
POTASSIUM SERPL-SCNC: 3.5 MMOL/L (ref 3.5–5.2)
PROT SERPL-MCNC: 7 G/DL (ref 6–8.5)
RBC # BLD AUTO: 4.49 X10E6/UL (ref 4.14–5.8)
SODIUM SERPL-SCNC: 134 MMOL/L (ref 134–144)
TSH SERPL DL<=0.005 MIU/L-ACNC: 5.93 UIU/ML (ref 0.45–4.5)
URATE SERPL-MCNC: 9.7 MG/DL (ref 3.8–8.4)
WBC # BLD AUTO: 5.2 X10E3/UL (ref 3.4–10.8)

## 2021-06-03 RX ORDER — CALCIUM CARBONATE/VITAMIN D3 500-10/5ML
400 LIQUID (ML) ORAL DAILY
Qty: 90 EACH | Refills: 1 | Status: SHIPPED | OUTPATIENT
Start: 2021-06-03 | End: 2022-06-03

## 2021-06-11 ENCOUNTER — OFFICE VISIT (OUTPATIENT)
Dept: FAMILY MEDICINE CLINIC | Facility: CLINIC | Age: 51
End: 2021-06-11

## 2021-06-11 VITALS
SYSTOLIC BLOOD PRESSURE: 110 MMHG | HEIGHT: 72 IN | BODY MASS INDEX: 27.06 KG/M2 | TEMPERATURE: 98.9 F | OXYGEN SATURATION: 98 % | WEIGHT: 199.8 LBS | HEART RATE: 86 BPM | DIASTOLIC BLOOD PRESSURE: 82 MMHG

## 2021-06-11 DIAGNOSIS — M1A.00X1 GOUTY ARTHROPATHY, CHRONIC, WITH TOPHI: Primary | ICD-10-CM

## 2021-06-11 DIAGNOSIS — Z76.89 ENCOUNTER FOR WHEELCHAIR ASSESSMENT: ICD-10-CM

## 2021-06-11 PROCEDURE — 99213 OFFICE O/P EST LOW 20 MIN: CPT | Performed by: NURSE PRACTITIONER

## 2021-06-11 NOTE — PROGRESS NOTES
"Chief Complaint  DME Here for a face to face mobility exam    Subjective          Macario Carpio presents to [facility]ARE  History of Present Illness     Patient is here today for face to face mobility exam for motorized wheelchair.  He had exam with PT through [facility] recently.       Why can't an upright device (cane, walker, crutches) be used safely or timely inside his home environment?   Patient is at increased risk for falls and on days when his GOUT is flared he cannot tolerate.  He also cannot tolerate standing for long periods of time even without flare with chronic pain.  He has decrease in ROM in arms, legs, hands, and feet from gouty tophi. He currently uses front wheel walker for mobility around his home with increased falls every few weeks due to sensation deficiets and pain in all joints of body from chronic gout. Front wheel walker is no longer safe or efficient for use around the home.     Why can't an optimally configured manual wheelchair be used safely or timely inside his home environment? Patient occasionally used manual wheelchair for mobility about the home; however, pain in bilateral upper extremities from chronic gout presents difficulties.  In order to maintain joint integrity and prevent further joint damage, manual wheelchair propulsion around the home is no longer safe or efficient.       Why can't a scooter be used safely or timely inside his home environment? Patient's upper extremity pain and ROM limitations from chronic gout would not allow for safe mobility around the home environment.  Turning radius of scooter wound not allow patient to navigate household environment in safe and efficient manner.      Objective   Vital Signs:   /82   Pulse 86   Temp 98.9 °F (37.2 °C)   Ht 182.9 cm (72\")   Wt 90.6 kg (199 lb 12.8 oz)   SpO2 98%   BMI 27.10 kg/m²     Physical Exam  Constitutional:       Appearance: Normal appearance.   Cardiovascular:      " Rate and Rhythm: Normal rate and regular rhythm.      Pulses: Normal pulses.   Pulmonary:      Effort: Pulmonary effort is normal.      Breath sounds: Normal breath sounds.   Musculoskeletal:      Comments: Has cane.  Multiple tophi noted.     Skin:     General: Skin is warm and dry.   Neurological:      Mental Status: He is alert and oriented to person, place, and time.   Psychiatric:         Mood and Affect: Mood normal.         Behavior: Behavior normal.         Thought Content: Thought content normal.         Judgment: Judgment normal.        Result Review :                 Assessment and Plan    Diagnoses and all orders for this visit:    1. Gouty arthropathy, chronic, with tophi (Primary)    2. Encounter for wheelchair assessment      Why can't an upright device (cane, walker, crutches) be used safely or timely inside his home environment?   Patient is at increased risk for falls and on days when his GOUT is flared he cannot tolerate.  He also cannot tolerate standing for long periods of time even without flare with chronic pain.  He has decrease in ROM in arms, legs, hands, and feet from gouty tophi. He currently uses front wheel walker for mobility around his home with increased falls every few weeks due to sensation deficiets and pain in all joints of body from chronic gout. Front wheel walker is no longer safe or efficient for use around the home.     Why can't an optimally configured manual wheelchair be used safely or timely inside his home environment? Patient occasionally used manual wheelchair for mobility about the home; however, pain in bilateral upper extremities from chronic gout presents difficulties.  In order to maintain joint integrity and prevent further joint damage, manual wheelchair propulsion around the home is no longer safe or efficient.       Why can't a scooter be used safely or timely inside his home environment? Patient's upper extremity pain and ROM limitations from chronic gout  would not allow for safe mobility around the home environment.  Turning radius of scooter wound not allow patient to navigate household environment in safe and efficient manner.       Follow Up   No follow-ups on file.  Patient was given instructions and counseling regarding his condition or for health maintenance advice. Please see specific information pulled into the AVS if appropriate.

## 2021-07-08 RX ORDER — LOSARTAN POTASSIUM 50 MG/1
50 TABLET ORAL DAILY
Qty: 90 TABLET | Refills: 1 | Status: SHIPPED | OUTPATIENT
Start: 2021-07-08 | End: 2022-01-14 | Stop reason: SDUPTHER

## 2021-07-08 NOTE — TELEPHONE ENCOUNTER
Caller: Macario Carpio    Relationship: Self    Best call back number: [phone]    Medication needed:   Requested Prescriptions     Pending Prescriptions Disp Refills   • losartan (COZAAR) 25 MG tablet 90 tablet 1     Sig: Take 1 tablet by mouth Daily.     PATIENT STATES HE WAS TOLD INCREASE DOSAGE TO 2 PILLS PER DAY. PATIENT HAS RUN OUT OF MEDICATION 30 DAYS EARLY DUE TO INCREASE. PLEASE SEND NEW PRESCRIPTION FOR 2 PILLS PER DAY OR 50 MG TABLETS.    When do you need the refill by: ASAP     What additional details did the patient provide when requesting the medication: OUT OF MEDICATION     Does the patient have less than a 3 day supply:  [x] Yes  [] No    What is the patient's preferred pharmacy: [address] - [phone] [facility] [phone] FX

## 2021-07-29 RX ORDER — CITALOPRAM 10 MG/1
TABLET ORAL
Qty: 90 TABLET | Refills: 0 | Status: SHIPPED | OUTPATIENT
Start: 2021-07-29 | End: 2021-08-05

## 2021-08-05 ENCOUNTER — TELEPHONE (OUTPATIENT)
Dept: FAMILY MEDICINE CLINIC | Facility: CLINIC | Age: 51
End: 2021-08-05

## 2021-08-05 ENCOUNTER — OFFICE VISIT (OUTPATIENT)
Dept: FAMILY MEDICINE CLINIC | Facility: CLINIC | Age: 51
End: 2021-08-05

## 2021-08-05 VITALS
WEIGHT: 200 LBS | HEART RATE: 76 BPM | HEIGHT: 72 IN | OXYGEN SATURATION: 99 % | SYSTOLIC BLOOD PRESSURE: 118 MMHG | DIASTOLIC BLOOD PRESSURE: 78 MMHG | BODY MASS INDEX: 27.09 KG/M2 | TEMPERATURE: 96.8 F

## 2021-08-05 DIAGNOSIS — E87.1 HYPONATREMIA: ICD-10-CM

## 2021-08-05 DIAGNOSIS — Z00.00 MEDICARE ANNUAL WELLNESS VISIT, SUBSEQUENT: Primary | ICD-10-CM

## 2021-08-05 DIAGNOSIS — D50.0 IRON DEFICIENCY ANEMIA DUE TO CHRONIC BLOOD LOSS: ICD-10-CM

## 2021-08-05 DIAGNOSIS — Z86.39 HISTORY OF HYPOTHYROIDISM: ICD-10-CM

## 2021-08-05 DIAGNOSIS — R53.82 CHRONIC FATIGUE: ICD-10-CM

## 2021-08-05 DIAGNOSIS — M1A.00X1 GOUTY ARTHROPATHY, CHRONIC, WITH TOPHI: ICD-10-CM

## 2021-08-05 DIAGNOSIS — F32.2 CURRENT SEVERE EPISODE OF MAJOR DEPRESSIVE DISORDER WITHOUT PSYCHOTIC FEATURES, UNSPECIFIED WHETHER RECURRENT (HCC): ICD-10-CM

## 2021-08-05 PROBLEM — K74.60 HEPATIC CIRRHOSIS (HCC): Status: ACTIVE | Noted: 2021-08-05

## 2021-08-05 PROBLEM — I71.40 ABDOMINAL AORTIC ANEURYSM (AAA): Status: ACTIVE | Noted: 2021-08-05

## 2021-08-05 PROBLEM — G89.29 CHRONIC PAIN: Status: ACTIVE | Noted: 2021-08-05

## 2021-08-05 PROBLEM — B19.20 HEPATITIS C VIRUS INFECTION: Status: ACTIVE | Noted: 2021-08-05

## 2021-08-05 PROCEDURE — 90746 HEPB VACCINE 3 DOSE ADULT IM: CPT | Performed by: NURSE PRACTITIONER

## 2021-08-05 PROCEDURE — 90471 IMMUNIZATION ADMIN: CPT | Performed by: NURSE PRACTITIONER

## 2021-08-05 PROCEDURE — 1170F FXNL STATUS ASSESSED: CPT | Performed by: NURSE PRACTITIONER

## 2021-08-05 PROCEDURE — G0439 PPPS, SUBSEQ VISIT: HCPCS | Performed by: NURSE PRACTITIONER

## 2021-08-05 PROCEDURE — 90472 IMMUNIZATION ADMIN EACH ADD: CPT | Performed by: NURSE PRACTITIONER

## 2021-08-05 PROCEDURE — 90715 TDAP VACCINE 7 YRS/> IM: CPT | Performed by: NURSE PRACTITIONER

## 2021-08-05 PROCEDURE — 90632 HEPA VACCINE ADULT IM: CPT | Performed by: NURSE PRACTITIONER

## 2021-08-05 PROCEDURE — G0010 ADMIN HEPATITIS B VACCINE: HCPCS | Performed by: NURSE PRACTITIONER

## 2021-08-05 PROCEDURE — 1159F MED LIST DOCD IN RCRD: CPT | Performed by: NURSE PRACTITIONER

## 2021-08-05 PROCEDURE — 96160 PT-FOCUSED HLTH RISK ASSMT: CPT | Performed by: NURSE PRACTITIONER

## 2021-08-05 RX ORDER — CITALOPRAM 20 MG/1
10 TABLET ORAL DAILY
Qty: 30 TABLET | Refills: 2 | Status: SHIPPED | OUTPATIENT
Start: 2021-08-05 | End: 2021-12-01 | Stop reason: SDUPTHER

## 2021-08-05 RX ORDER — MELOXICAM 15 MG/1
15 TABLET ORAL DAILY
Qty: 30 TABLET | Refills: 2 | Status: SHIPPED | OUTPATIENT
Start: 2021-08-05 | End: 2022-01-07

## 2021-08-05 RX ORDER — AMANTADINE HYDROCHLORIDE 100 MG/1
TABLET ORAL
COMMUNITY
Start: 2021-07-30 | End: 2022-08-14 | Stop reason: HOSPADM

## 2021-08-05 NOTE — PATIENT INSTRUCTIONS
Medicare Wellness  Personal Prevention Plan of Service     Date of Office Visit:  2021  Encounter Provider:  CALLUM Pena  Place of Service:  [facility]  Patient Name: Macario Carpio  :  1970    As part of the Medicare Wellness portion of your visit today, we are providing you with this personalized preventive plan of services (PPPS). This plan is based upon recommendations of the [place] Preventive Services Task Force (USPSTF) and the Advisory Committee on Immunization Practices (ACIP).    This lists the preventive care services that should be considered, and provides dates of when you are due. Items listed as completed are up-to-date and do not require any further intervention.    Health Maintenance   Topic Date Due   • COLORECTAL CANCER SCREENING  Never done   • COVID-19 Vaccine (1) Never done   • TDAP/TD VACCINES (1 - Tdap) Never done   • ZOSTER VACCINE (1 of 2) Never done   • Hepatitis B (2 of 3 - Hep B Twinrix risk 3-dose series) 2021   • INFLUENZA VACCINE  10/01/2021   • LIPID PANEL  2022   • ANNUAL WELLNESS VISIT  2022   • Pneumococcal Vaccine 0-64 (2 of 2 - PPSV23) 10/09/2035   • HEPATITIS C SCREENING  Completed       Orders Placed This Encounter   Procedures   • Tdap Vaccine Greater Than or Equal To 8yo IM   • Hepatitis A Hepatitis B Combined Vaccine IM   • Comprehensive Metabolic Panel     Order Specific Question:   Release to patient     Answer:   Immediate   • TSH     Order Specific Question:   Release to patient     Answer:   Immediate   • T4, free     Order Specific Question:   Release to patient     Answer:   Immediate   • T3, free     Order Specific Question:   Release to patient     Answer:   Immediate   • CBC & Differential       Return if symptoms worsen or fail to improve.

## 2021-08-05 NOTE — TELEPHONE ENCOUNTER
Called pt and he stated that his medicare nurse had him complete the cologuard. I called cologuard and they stated that they didn't have anything for the patient on file.

## 2021-08-05 NOTE — PROGRESS NOTES
The ABCs of the Annual Wellness Visit  Subsequent Medicare Wellness Visit    Chief Complaint   Patient presents with   • Medicare Wellness-subsequent       Subjective   History of Present Illness:  Macario Carpio is a 50 y.o. male who presents for a Subsequent Medicare Wellness Visit.    He reports he finally got his scooter and it is helping with mobility and pain a lot.     Citalopram working well for mood.  Would like increase    HEALTH RISK ASSESSMENT    Recent Hospitalizations:  No hospitalization(s) within the last year.    Current Medical Providers:  Patient Care Team:  Andrew Grayson APRN as PCP - General (Family Medicine)    Smoking Status:  Social History     Tobacco Use   Smoking Status Never Smoker   Smokeless Tobacco Never Used       Alcohol Consumption:  Social History     Substance and Sexual Activity   Alcohol Use No       Depression Screen:   PHQ-2/PHQ-9 Depression Screening 8/5/2021   Little interest or pleasure in doing things 0   Feeling down, depressed, or hopeless 0   Trouble falling or staying asleep, or sleeping too much 0   Feeling tired or having little energy 0   Poor appetite or overeating 0   Feeling bad about yourself - or that you are a failure or have let yourself or your family down 0   Trouble concentrating on things, such as reading the newspaper or watching television 0   Moving or speaking so slowly that other people could have noticed. Or the opposite - being so fidgety or restless that you have been moving around a lot more than usual 0   Thoughts that you would be better off dead, or of hurting yourself in some way 0   Total Score 0   If you checked off any problems, how difficult have these problems made it for you to do your work, take care of things at home, or get along with other people? Not difficult at all       Fall Risk Screen:  JAMALADI Fall Risk Assessment has not been completed.    Health Habits and Functional and Cognitive Screening:  Functional & Cognitive  Status 8/5/2021   Do you have difficulty preparing food and eating? No   Do you have difficulty bathing yourself, getting dressed or grooming yourself? No   Do you have difficulty using the toilet? No   Do you have difficulty moving around from place to place? No   Do you have trouble with steps or getting out of a bed or a chair? No   Current Diet Well Balanced Diet   Dental Exam Up to date   Eye Exam Up to date   Exercise (times per week) 0 times per week   Current Exercises Include No Regular Exercise   Do you need help using the phone?  No   Are you deaf or do you have serious difficulty hearing?  No   Do you need help with transportation? No   Do you need help shopping? No   Do you need help preparing meals?  No   Do you need help with housework?  No   Do you need help with laundry? No   Do you need help taking your medications? No   Do you need help managing money? No   Do you ever drive or ride in a car without wearing a seat belt? No   Have you felt unusual stress, anger or loneliness in the last month? No   Who do you live with? Spouse   If you need help, do you have trouble finding someone available to you? No   Have you been bothered in the last four weeks by sexual problems? No   Do you have difficulty concentrating, remembering or making decisions? No         Does the patient have evidence of cognitive impairment? No    Asprin use counseling:Does not need ASA (and currently is not on it)       Age-appropriate Screening Schedule:  Refer to the list below for future screening recommendations based on patient's age, sex and/or medical conditions. Orders for these recommended tests are listed in the plan section. The patient has been provided with a written plan.    Health Maintenance   Topic Date Due   • TDAP/TD VACCINES (1 - Tdap) Never done   • ZOSTER VACCINE (1 of 2) Never done   • INFLUENZA VACCINE  10/01/2021   • LIPID PANEL  02/03/2022          The following portions of the patient's history were  reviewed and updated as appropriate: allergies, current medications, past family history, past medical history, past social history, past surgical history and problem list.    Outpatient Medications Prior to Visit   Medication Sig Dispense Refill   • allopurinol (ZYLOPRIM) 100 MG tablet Take 1 tablet by mouth twice daily 180 tablet 1   • amantadine (SYMMETREL) 100 MG tablet      • amLODIPine (NORVASC) 5 MG tablet Take 1 tablet by mouth Daily. 90 tablet 1   • atorvastatin (LIPITOR) 80 MG tablet Take 1 tablet by mouth Daily. 90 tablet 1   • carvedilol (COREG) 25 MG tablet Take 1 tablet by mouth 2 (Two) Times a Day With Meals. 180 tablet 1   • citalopram (CeleXA) 10 MG tablet TAKE ONE TABLET BY MOUTH DAILY 90 tablet 0   • furosemide (LASIX) 40 MG tablet Take 1 tablet by mouth Daily. 90 tablet 1   • lactulose (CHRONULAC) 10 GM/15ML solution Take 30 mL by mouth 2 (two) times a day. 1800 mL 0   • losartan (COZAAR) 50 MG tablet Take 1 tablet by mouth Daily. 90 tablet 1   • Magnesium Oxide 400 MG capsule Take 400 mg by mouth Daily. 90 each 1   • Menthol, Topical Analgesic, (Biofreeze Roll-On) 4 % gel Apply 1 application topically 4 (Four) Times a Day As Needed (pain). 118 mL 2   • Misc. Devices (TRANSFER BENCH) misc 1 each Daily. To use while showering. 1 each 0   • mupirocin (BACTROBAN) 2 % ointment      • oxyCODONE (ROXICODONE) 10 MG tablet 10 mg.     • pantoprazole (PROTONIX) 40 MG EC tablet Take 1 tablet by mouth Daily. 90 tablet 1   • Xifaxan 550 MG tablet      • colchicine 0.6 MG tablet Take 1 tablet by mouth 2 (Two) Times a Day. 180 tablet 1   • zinc gluconate 50 MG tablet        No facility-administered medications prior to visit.       Patient Active Problem List   Diagnosis   • Iron deficiency anemia   • Hepatic cirrhosis (CMS/HCC)   • Screening for malignant neoplasm of colon   • Abdominal aortic aneurysm (AAA) (CMS/HCC)   • Hepatitis C virus infection   • Gout   • Hypercholesterolemia   • Hepatic cirrhosis  "(CMS/[facility])   • Thoracic aortic aneurysm without rupture (CMS/[facility])   • Hypertensive disorder   • Hip pain   • Chronic pain   • Bicuspid aortic valve   • Arthritis of right hip   • Aortic valve regurgitation   • Anxiety       Advanced Care Planning:  ACP discussion was held with the patient during this visit. Patient does not have an advance directive, information provided.    Review of Systems    Compared to one year ago, the patient feels his physical health is better.  Compared to one year ago, the patient feels his mental health is better.    Reviewed chart for potential of high risk medication in the elderly: not applicable  Reviewed chart for potential of harmful drug interactions in the elderly:not applicable    Objective         Vitals:    08/05/21 1400   BP: 118/78   Pulse: 76   Temp: 96.8 °F (36 °C)   SpO2: 99%   Weight: 90.7 kg (200 lb)   Height: 182.9 cm (72\")       Body mass index is 27.12 kg/m².  Discussed the patient's BMI with him. The BMI is above average; BMI management plan is completed.    Physical Exam  Constitutional:       Appearance: Normal appearance.   Cardiovascular:      Rate and Rhythm: Normal rate and regular rhythm.   Pulmonary:      Effort: Pulmonary effort is normal.      Breath sounds: Normal breath sounds.   Neurological:      Mental Status: He is alert and oriented to person, place, and time.   Psychiatric:         Mood and Affect: Mood normal.         Behavior: Behavior normal.         Thought Content: Thought content normal.         Judgment: Judgment normal.         Lab Results   Component Value Date     (H) 06/02/2021        Assessment/Plan   Medicare Risks and Personalized Health Plan  CMS Preventative Services Quick Reference  Advance Directive Discussion  Chronic Pain   Colon Cancer Screening  Dementia/Memory   Depression/Dysphoria  Diabetic Lab Screening   Fall Risk  Immunizations Discussed/Encouraged (specific immunizations; Tdap, Hepatitis A Vaccine/Series and " Shingrix )  Obesity/Overweight      Advance care directive-we will give sample when he checks out  Chronic pain-continue to see pain management.  We will continue topical treatment and try Mobic.  We discussed treatment options as he is wanting to decrease pain medication if he can  Colon cancer screening-patient reports that he had Cologuard test earlier this year.  I am not seeing results.  Will request.  Memory-no issues noted with memory  Depression-well-controlled.  We are going to increase citalopram dose today.  If any issues notify provider  Diabetic lab screening-we will recheck sugar Today  Fall risk patient is high risk.  We discussed Gatorade to help on days when he is not feeling well-  Immunizations-updating Tdap and hepatitis vaccine series.  We discussed Shingrix and recommended that he go to his pharmacy for this.  Overweight patient BMI is 27.  We discussed the need for weight loss to BMI between 18 and 25.  This can be achieved through reduction of calories and carbs in diet.-We will follow-up on this yearly.    The above risks/problems have been discussed with the patient.  Pertinent information has been shared with the patient in the After Visit Summary.  Follow up plans and orders are seen below in the Assessment/Plan Section.    There are no diagnoses linked to this encounter.  Follow Up:  No follow-ups on file.     An After Visit Summary and PPPS were given to the patient.

## 2021-08-05 NOTE — TELEPHONE ENCOUNTER
Please let him know I think he might be thinking of the Hemoccult testing.  I think the Medicare nurse does those.  Let him know that PCP can only order the Cologuard test and that is what the colorectal screening is.  Is a box sent to his house.  If you would like me to order that let me know.  If not that is up to him.  I would recommend either Cologuard or colonoscopy since he is 50.

## 2021-08-05 NOTE — TELEPHONE ENCOUNTER
----- Message from CALLUM Pena sent at 8/5/2021  2:15 PM EDT -----  Please print out cologard results. Thank you

## 2021-08-06 LAB
ALBUMIN SERPL-MCNC: 4 G/DL (ref 4–5)
ALBUMIN/GLOB SERPL: 1.4 {RATIO} (ref 1.2–2.2)
ALP SERPL-CCNC: 107 IU/L (ref 48–121)
ALT SERPL-CCNC: 13 IU/L (ref 0–44)
AST SERPL-CCNC: 37 IU/L (ref 0–40)
BASOPHILS # BLD AUTO: 0.1 X10E3/UL (ref 0–0.2)
BASOPHILS NFR BLD AUTO: 1 %
BILIRUB SERPL-MCNC: 1.7 MG/DL (ref 0–1.2)
BUN SERPL-MCNC: 16 MG/DL (ref 6–24)
BUN/CREAT SERPL: 12 (ref 9–20)
CALCIUM SERPL-MCNC: 9.6 MG/DL (ref 8.7–10.2)
CHLORIDE SERPL-SCNC: 104 MMOL/L (ref 96–106)
CO2 SERPL-SCNC: 19 MMOL/L (ref 20–29)
CREAT SERPL-MCNC: 1.3 MG/DL (ref 0.76–1.27)
EOSINOPHIL # BLD AUTO: 0.2 X10E3/UL (ref 0–0.4)
EOSINOPHIL NFR BLD AUTO: 3 %
ERYTHROCYTE [DISTWIDTH] IN BLOOD BY AUTOMATED COUNT: 13.9 % (ref 11.6–15.4)
GLOBULIN SER CALC-MCNC: 2.9 G/DL (ref 1.5–4.5)
GLUCOSE SERPL-MCNC: 105 MG/DL (ref 65–99)
HCT VFR BLD AUTO: 38.6 % (ref 37.5–51)
HGB BLD-MCNC: 13.2 G/DL (ref 13–17.7)
IMM GRANULOCYTES # BLD AUTO: 0 X10E3/UL (ref 0–0.1)
IMM GRANULOCYTES NFR BLD AUTO: 0 %
LYMPHOCYTES # BLD AUTO: 1.3 X10E3/UL (ref 0.7–3.1)
LYMPHOCYTES NFR BLD AUTO: 24 %
MCH RBC QN AUTO: 31.3 PG (ref 26.6–33)
MCHC RBC AUTO-ENTMCNC: 34.2 G/DL (ref 31.5–35.7)
MCV RBC AUTO: 92 FL (ref 79–97)
MONOCYTES # BLD AUTO: 0.5 X10E3/UL (ref 0.1–0.9)
MONOCYTES NFR BLD AUTO: 9 %
NEUTROPHILS # BLD AUTO: 3.4 X10E3/UL (ref 1.4–7)
NEUTROPHILS NFR BLD AUTO: 63 %
PLATELET # BLD AUTO: 128 X10E3/UL (ref 150–450)
POTASSIUM SERPL-SCNC: 3.8 MMOL/L (ref 3.5–5.2)
PROT SERPL-MCNC: 6.9 G/DL (ref 6–8.5)
RBC # BLD AUTO: 4.22 X10E6/UL (ref 4.14–5.8)
SODIUM SERPL-SCNC: 139 MMOL/L (ref 134–144)
T3FREE SERPL-MCNC: 3 PG/ML (ref 2–4.4)
T4 FREE SERPL-MCNC: 1.46 NG/DL (ref 0.82–1.77)
TSH SERPL DL<=0.005 MIU/L-ACNC: 5.99 UIU/ML (ref 0.45–4.5)
WBC # BLD AUTO: 5.4 X10E3/UL (ref 3.4–10.8)

## 2021-10-08 ENCOUNTER — OFFICE VISIT (OUTPATIENT)
Dept: FAMILY MEDICINE CLINIC | Facility: CLINIC | Age: 51
End: 2021-10-08

## 2021-10-08 VITALS
SYSTOLIC BLOOD PRESSURE: 126 MMHG | HEART RATE: 77 BPM | TEMPERATURE: 98.2 F | HEIGHT: 72 IN | BODY MASS INDEX: 29.04 KG/M2 | OXYGEN SATURATION: 98 % | DIASTOLIC BLOOD PRESSURE: 84 MMHG | WEIGHT: 214.4 LBS

## 2021-10-08 DIAGNOSIS — Z79.891 CHRONIC USE OF OPIATE FOR THERAPEUTIC PURPOSE: ICD-10-CM

## 2021-10-08 DIAGNOSIS — R52 ENCOUNTER FOR PAIN MANAGEMENT: ICD-10-CM

## 2021-10-08 DIAGNOSIS — L03.114 CELLULITIS OF LEFT ARM: Primary | ICD-10-CM

## 2021-10-08 DIAGNOSIS — M1A.00X1 GOUTY ARTHROPATHY, CHRONIC, WITH TOPHI: ICD-10-CM

## 2021-10-08 PROCEDURE — 99214 OFFICE O/P EST MOD 30 MIN: CPT | Performed by: NURSE PRACTITIONER

## 2021-10-08 RX ORDER — METHION/INOS/CHOL BT/B COM/LIV 110MG-86MG
100 CAPSULE ORAL
COMMUNITY
Start: 2021-09-10 | End: 2021-10-10

## 2021-10-08 RX ORDER — DOXYCYCLINE 100 MG/1
100 CAPSULE ORAL 2 TIMES DAILY
Qty: 20 CAPSULE | Refills: 0 | Status: SHIPPED | OUTPATIENT
Start: 2021-10-08 | End: 2022-05-04

## 2021-10-08 RX ORDER — CLINDAMYCIN HYDROCHLORIDE 300 MG/1
CAPSULE ORAL
COMMUNITY
Start: 2021-10-02 | End: 2022-05-04

## 2021-10-08 RX ORDER — PREDNISONE 20 MG/1
TABLET ORAL
COMMUNITY
Start: 2021-09-10 | End: 2022-05-04

## 2021-10-08 NOTE — PROGRESS NOTES
"Chief Complaint  Hospital Follow Up Visit ([facility], records in media)    Subjective          Macario Carpio presents to [facility]  History of Present Illness     Patient presents today for hospital follow up. He was in hospital 9/21/2021-10/2/2021 for cellulitis and wound in left arm.  Got infected.    Had surgery on arm.    Was discharged with home health and having dressing changes with them.   Has follow up with Dr. Mckeon \" in the next few weeks\". Not sure of date.   Doesn't have one with hand specialty.     States he was given oxcodone by discharge facility, but his pain management clinic \"got mad\" at him for taking the medication. However, [facility] doesn't show it has been filled until October since July. 7/8/2021-oxycodone 105 tablets   Oxycodone 10mg 10/2/2021 #15  Asking for referral to [facility].     He reports he was trying to wean off.     He has a note from home health stating that he has a left forearm wound infection, redness, warmth, edema, increased pain to index and middle finger and is having numbness to that finger also has had elevated temp .3      He reports he came home last Friday and pain was doing better and got worse again.      He reports he is still taking clindamycin.       Objective   Vital Signs:   /84   Pulse 77   Temp 98.2 °F (36.8 °C)   Ht 182.9 cm (72\")   Wt 97.3 kg (214 lb 6.4 oz)   SpO2 98%   BMI 29.08 kg/m²     Physical Exam  Constitutional:       Appearance: He is ill-appearing.   Skin:     General: Skin is warm and dry.      Findings: Wound (Abscess left forearm. Periwound area is erythematous and hot to touch. No streaking noted currently. Tender to touch) present.   Neurological:      Mental Status: He is alert and oriented to person, place, and time.   Psychiatric:         Mood and Affect: Mood normal.         Behavior: Behavior normal.         Thought Content: Thought content normal.         Judgment: Judgment " normal.        Result Review :                 Assessment and Plan    Diagnoses and all orders for this visit:    1. Cellulitis of left arm (Primary)    2. Encounter for pain management    3. Chronic use of opiate for therapeutic purpose    4. Gouty arthropathy, chronic, with tophi    Other orders  -     doxycycline (MONODOX) 100 MG capsule; Take 1 capsule by mouth 2 (Two) Times a Day.  Dispense: 20 capsule; Refill: 0      Cellulitis appears to be worsening. I spoke to Dr. Arce's office. He did not have an appointment scheduled. I got him one scheduled for 815 on Monday. All like him to switch to doxycycline antibiotic. If worsening streaking or pain with inflammation call back to the ER for more immediate assistance. He verbalizes understanding    Pain management-unfortunately my staff spoke to [facility] pain medicine and patient was discharged from their clinic on Jul 19, 2021 for failed drug screen. Because he was not honest with me I would not be able to give him even a short-term supply of pain medication. I will refer him to another pain clinic. However, they may not accept him for evaluation.        Follow Up   No follow-ups on file.  Patient was given instructions and counseling regarding his condition or for health maintenance advice. Please see specific information pulled into the AVS if appropriate.

## 2021-10-11 ENCOUNTER — TELEPHONE (OUTPATIENT)
Dept: FAMILY MEDICINE CLINIC | Facility: CLINIC | Age: 51
End: 2021-10-11

## 2021-10-11 NOTE — TELEPHONE ENCOUNTER
----- Message from CALLUM Pena sent at 10/8/2021  3:10 PM EDT -----  unfortunately my staff spoke to [facility] pain medicine and patient was discharged from their clinic on Jul 19, 2021 for failed drug screen. Because he was not honest with me I would not be able to give him even a short-term supply of pain medication. I will refer him to another pain clinic. However, they may not accept him for evaluation.

## 2021-10-12 NOTE — TELEPHONE ENCOUNTER
Pt stated that he cant see Dr. Wilkins with Pain Management until 10/11/2020. He will like to know if a med refill can be called in on his oxyCODONE-acetaminophen (PERCOCET) 7.5-325 MG per tablet [[id number]] (Order [id number])      
yes

## 2021-12-01 ENCOUNTER — TELEPHONE (OUTPATIENT)
Dept: FAMILY MEDICINE CLINIC | Facility: CLINIC | Age: 51
End: 2021-12-01

## 2021-12-01 RX ORDER — CITALOPRAM 20 MG/1
10 TABLET ORAL DAILY
Qty: 45 TABLET | Refills: 1 | Status: SHIPPED | OUTPATIENT
Start: 2021-12-01 | End: 2022-02-02

## 2021-12-01 RX ORDER — THIAMINE MONONITRATE (VIT B1) 100 MG
100 TABLET ORAL DAILY
Qty: 30 TABLET | Refills: 2 | Status: SHIPPED | OUTPATIENT
Start: 2021-12-01

## 2021-12-01 NOTE — TELEPHONE ENCOUNTER
======================================  Call Type: Non Urgent           Reg Dr: CALLUM Marcos  Name/Facility: [facility]          Pt Name: Same            Phone:  [phone];              : 1970          Pt Preg: NO              Msg: Would Like A. Refll  On  Meds: Doxycycline 100mg, Vitamin B-1  100mg, Clindamycin 300mg &. Citalopram  20MG. Send To [facility] in  [place], [place]    --------------------------------------  Message History  Account: [id number]  Taken:  2021  9:09p   Serial#: 1

## 2021-12-01 NOTE — TELEPHONE ENCOUNTER
I do not feel comfortable refilling the antibiotic without seeing him for evaluation.  I will send in vitamin B and citalopram.

## 2021-12-07 RX ORDER — AMLODIPINE BESYLATE 5 MG/1
TABLET ORAL
Qty: 30 TABLET | Refills: 0 | Status: SHIPPED | OUTPATIENT
Start: 2021-12-07 | End: 2021-12-09

## 2021-12-09 RX ORDER — AMLODIPINE BESYLATE 5 MG/1
TABLET ORAL
Qty: 30 TABLET | Refills: 0 | Status: SHIPPED | OUTPATIENT
Start: 2021-12-09 | End: 2022-01-07

## 2022-01-07 RX ORDER — AMLODIPINE BESYLATE 5 MG/1
TABLET ORAL
Qty: 30 TABLET | Refills: 0 | Status: SHIPPED | OUTPATIENT
Start: 2022-01-07 | End: 2022-02-03

## 2022-01-07 RX ORDER — MELOXICAM 15 MG/1
TABLET ORAL
Qty: 30 TABLET | Refills: 0 | Status: SHIPPED | OUTPATIENT
Start: 2022-01-07 | End: 2022-02-03

## 2022-01-14 RX ORDER — RIFAXIMIN 550 MG/1
550 TABLET ORAL
OUTPATIENT
Start: 2022-01-14

## 2022-01-14 RX ORDER — LOSARTAN POTASSIUM 50 MG/1
50 TABLET ORAL DAILY
Qty: 30 TABLET | Refills: 0 | Status: SHIPPED | OUTPATIENT
Start: 2022-01-14 | End: 2022-01-19

## 2022-01-14 NOTE — TELEPHONE ENCOUNTER
Caller: [facility] - [place] [address] - [phone] [facility] [phone] FX    Relationship: Pharmacy    Best call back number: [phone]    Requested Prescriptions:   Requested Prescriptions     Pending Prescriptions Disp Refills   • losartan (COZAAR) 50 MG tablet 90 tablet 1     Sig: Take 1 tablet by mouth Daily.   • Xifaxan 550 MG tablet          Pharmacy where request should be sent: [facility] - [place] [address] - [phone] [facility] [phone] FX     Additional details provided by patient: PHARMACY NEEDS NEW PRESCRIPTION SENT TO THEM ASAP    Does the patient have less than a 3 day supply:  [x] Yes  [] No    Noel Cedeno Rep   01/14/22 11:51 EST

## 2022-01-14 NOTE — TELEPHONE ENCOUNTER
I have never filled Xifaxan before. Is typically prescribed by GI. We don't even have current dose for patient. Please check with him. Please let him know he needs to follow-up for any additional refills as he no showed last appointment.

## 2022-01-19 RX ORDER — LOSARTAN POTASSIUM 50 MG/1
50 TABLET ORAL DAILY
Qty: 21 TABLET | Refills: 0 | Status: SHIPPED | OUTPATIENT
Start: 2022-01-19 | End: 2022-02-03

## 2022-01-26 RX ORDER — PREDNISONE 20 MG/1
TABLET ORAL
Qty: 60 TABLET | Refills: 0 | OUTPATIENT
Start: 2022-01-26

## 2022-02-02 RX ORDER — CITALOPRAM 20 MG/1
TABLET ORAL
Qty: 30 TABLET | Refills: 1 | Status: SHIPPED | OUTPATIENT
Start: 2022-02-02 | End: 2022-03-23

## 2022-02-03 RX ORDER — LOSARTAN POTASSIUM 50 MG/1
50 TABLET ORAL DAILY
Qty: 30 TABLET | Refills: 0 | Status: SHIPPED | OUTPATIENT
Start: 2022-02-03 | End: 2022-03-07

## 2022-02-03 RX ORDER — AMLODIPINE BESYLATE 5 MG/1
TABLET ORAL
Qty: 30 TABLET | Refills: 0 | Status: SHIPPED | OUTPATIENT
Start: 2022-02-03 | End: 2022-03-07

## 2022-02-03 RX ORDER — MELOXICAM 15 MG/1
TABLET ORAL
Qty: 30 TABLET | Refills: 0 | Status: SHIPPED | OUTPATIENT
Start: 2022-02-03 | End: 2022-03-07

## 2022-02-03 NOTE — TELEPHONE ENCOUNTER
Spoke to patient and informed him that he needs to be seen he is stating that his child is in the hospital and he will call to make the appointment

## 2022-03-03 RX ORDER — PANTOPRAZOLE SODIUM 40 MG/1
40 TABLET, DELAYED RELEASE ORAL DAILY
Qty: 90 TABLET | Refills: 0 | Status: SHIPPED | OUTPATIENT
Start: 2022-03-03 | End: 2022-05-19

## 2022-03-03 RX ORDER — MENTHOL 40 MG/ML
1 GEL TOPICAL 4 TIMES DAILY PRN
Qty: 118 ML | Refills: 0 | Status: SHIPPED | OUTPATIENT
Start: 2022-03-03 | End: 2022-05-04 | Stop reason: SDUPTHER

## 2022-03-03 NOTE — TELEPHONE ENCOUNTER
Caller: [facility] [place] [id number] [place] [address] - [phone] [facility] [phone] FX    Relationship: Pharmacy        Requested Prescriptions:   Requested Prescriptions     Pending Prescriptions Disp Refills   • Menthol, Topical Analgesic, (Biofreeze Roll-On) 4 % gel 118 mL 2     Sig: Apply 1 application topically 4 (Four) Times a Day As Needed (pain).   • pantoprazole (PROTONIX) 40 MG EC tablet 90 tablet 1     Sig: Take 1 tablet by mouth Daily.        Pharmacy where request should be sent: [facility] [place] [id number] [place] [address] - [phone] [facility] [phone] FX         Does the patient have less than a 3 day supply:  [] Yes  [x] No    Lilly Caldwell, PCT   03/03/22 10:21 EST

## 2022-03-07 RX ORDER — LOSARTAN POTASSIUM 50 MG/1
TABLET ORAL
Qty: 30 TABLET | Refills: 0 | Status: SHIPPED | OUTPATIENT
Start: 2022-03-07 | End: 2022-04-06

## 2022-03-07 RX ORDER — MELOXICAM 15 MG/1
TABLET ORAL
Qty: 30 TABLET | Refills: 0 | Status: SHIPPED | OUTPATIENT
Start: 2022-03-07 | End: 2022-04-06

## 2022-03-07 RX ORDER — AMLODIPINE BESYLATE 5 MG/1
TABLET ORAL
Qty: 30 TABLET | Refills: 0 | Status: SHIPPED | OUTPATIENT
Start: 2022-03-07 | End: 2022-04-06

## 2022-03-16 RX ORDER — ATORVASTATIN CALCIUM 80 MG/1
80 TABLET, FILM COATED ORAL DAILY
Qty: 90 TABLET | Refills: 0 | Status: SHIPPED | OUTPATIENT
Start: 2022-03-16 | End: 2022-07-11 | Stop reason: SDUPTHER

## 2022-03-23 RX ORDER — CITALOPRAM 20 MG/1
TABLET ORAL
Qty: 30 TABLET | Refills: 0 | Status: SHIPPED | OUTPATIENT
Start: 2022-03-23 | End: 2022-04-22

## 2022-04-06 RX ORDER — LOSARTAN POTASSIUM 50 MG/1
TABLET ORAL
Qty: 30 TABLET | Refills: 0 | Status: SHIPPED | OUTPATIENT
Start: 2022-04-06 | End: 2022-05-19

## 2022-04-06 RX ORDER — MELOXICAM 15 MG/1
TABLET ORAL
Qty: 30 TABLET | Refills: 0 | Status: SHIPPED | OUTPATIENT
Start: 2022-04-06 | End: 2022-07-11 | Stop reason: SDUPTHER

## 2022-04-06 RX ORDER — AMLODIPINE BESYLATE 5 MG/1
TABLET ORAL
Qty: 30 TABLET | Refills: 0 | Status: SHIPPED | OUTPATIENT
Start: 2022-04-06 | End: 2022-05-19

## 2022-04-22 RX ORDER — CARVEDILOL 25 MG/1
TABLET ORAL
Qty: 180 TABLET | Refills: 0 | Status: SHIPPED | OUTPATIENT
Start: 2022-04-22 | End: 2022-07-11 | Stop reason: SDUPTHER

## 2022-04-22 RX ORDER — CITALOPRAM 20 MG/1
TABLET ORAL
Qty: 30 TABLET | Refills: 0 | Status: SHIPPED | OUTPATIENT
Start: 2022-04-22 | End: 2022-06-16

## 2022-04-22 NOTE — TELEPHONE ENCOUNTER
Shi Gaona called and stated that the pt was discharged from the hospital on 3/19/22.  Pt had a Toe amputated, and he can start home health on May 4th at 10:30.  Shi asked if we would try to call pt and get him scheduled for a visit so he can get home health, she stated she thought he would benefit from it.  Stated as good number to reach pt was [phone]

## 2022-04-28 RX ORDER — MELOXICAM 15 MG/1
TABLET ORAL
Qty: 30 TABLET | Refills: 0 | OUTPATIENT
Start: 2022-04-28

## 2022-04-28 RX ORDER — AMLODIPINE BESYLATE 5 MG/1
TABLET ORAL
Qty: 30 TABLET | Refills: 0 | OUTPATIENT
Start: 2022-04-28

## 2022-04-28 RX ORDER — PANTOPRAZOLE SODIUM 40 MG/1
TABLET, DELAYED RELEASE ORAL
Qty: 90 TABLET | Refills: 0 | OUTPATIENT
Start: 2022-04-28

## 2022-04-28 RX ORDER — LOSARTAN POTASSIUM 50 MG/1
TABLET ORAL
Qty: 30 TABLET | Refills: 0 | OUTPATIENT
Start: 2022-04-28

## 2022-05-04 ENCOUNTER — OFFICE VISIT (OUTPATIENT)
Dept: FAMILY MEDICINE CLINIC | Facility: CLINIC | Age: 52
End: 2022-05-04

## 2022-05-04 ENCOUNTER — HOME HEALTH ADMISSION (OUTPATIENT)
Dept: HOME HEALTH SERVICES | Facility: HOME HEALTHCARE | Age: 52
End: 2022-05-04

## 2022-05-04 VITALS
HEART RATE: 76 BPM | OXYGEN SATURATION: 100 % | TEMPERATURE: 96.9 F | SYSTOLIC BLOOD PRESSURE: 118 MMHG | WEIGHT: 206.4 LBS | DIASTOLIC BLOOD PRESSURE: 82 MMHG | BODY MASS INDEX: 27.96 KG/M2 | HEIGHT: 72 IN

## 2022-05-04 DIAGNOSIS — Z79.891 CHRONIC USE OF OPIATE FOR THERAPEUTIC PURPOSE: ICD-10-CM

## 2022-05-04 DIAGNOSIS — Z09 HOSPITAL DISCHARGE FOLLOW-UP: Primary | ICD-10-CM

## 2022-05-04 DIAGNOSIS — M1A.00X1 GOUTY ARTHROPATHY, CHRONIC, WITH TOPHI: ICD-10-CM

## 2022-05-04 DIAGNOSIS — M86.9 OSTEOMYELITIS OF LEFT FOOT, UNSPECIFIED TYPE: ICD-10-CM

## 2022-05-04 PROCEDURE — 99214 OFFICE O/P EST MOD 30 MIN: CPT | Performed by: NURSE PRACTITIONER

## 2022-05-04 RX ORDER — SPIRONOLACTONE 25 MG/1
TABLET ORAL
Status: ON HOLD | COMMUNITY
Start: 2021-11-18 | End: 2022-08-13 | Stop reason: SDUPTHER

## 2022-05-04 RX ORDER — CELECOXIB 400 MG/1
CAPSULE ORAL
COMMUNITY
Start: 2022-03-26 | End: 2022-07-11

## 2022-05-04 RX ORDER — MENTHOL 40 MG/ML
1 GEL TOPICAL 4 TIMES DAILY PRN
Qty: 118 ML | Refills: 0 | Status: SHIPPED | OUTPATIENT
Start: 2022-05-04 | End: 2022-08-14 | Stop reason: HOSPADM

## 2022-05-04 RX ORDER — METHYLPREDNISOLONE 4 MG/1
TABLET ORAL
Qty: 21 TABLET | Refills: 0 | Status: SHIPPED | OUTPATIENT
Start: 2022-05-04 | End: 2022-08-14 | Stop reason: HOSPADM

## 2022-05-04 RX ORDER — FOLIC ACID 1 MG/1
1 TABLET ORAL DAILY
COMMUNITY
Start: 2021-11-18

## 2022-05-04 NOTE — PROGRESS NOTES
"Chief Complaint  Hospital Follow Up Visit ([facility], 4/19/22 right big toe amputation.  Left hospital AMA)    Subjective          Macario Carpio presents to [facility]  History of Present Illness     Patient is here today for hospital follow-up.  He was admitted on 4/13 for osteomyelitis secondary to gout.  He had to have amputation of right  great toe.  He left AGAINST MEDICAL ADVICE on 4/19/2022.  He returned to the ER on 4/26 with swelling of the lower extremity and was diagnosed with cellulitis and osteomyelitis at that time.  He was told to follow-up with me in 2 to 3 days as well for his follow-up with Dr. Kirby, orthopedic surgery.    Has been keeping it elevated and not putting pressure on it.  Is healing up good.    Finished antibiotic.      \"History of Present Illness  The patient presents with foot wound dehiscence. .  Old records reviewed at length. Pt with chronic gouty tophi. Has required amputations on both feet most recently 1st toe on R amputated at [facility] on 4/15/22 by Dr Almazan. It is noted that the patient stayed in the hospital until 4/19/22 at which time he continued to have fevers, was on Zosyn and Clindamycin but chose to leave against medical advice. He states he has not been on any antibiotics at home and that part of the surgical wound has dehisced after he soaked his foot in Epsom salts. In hindsight he says he feels this was a bad idea. Tonight his main complaint is foot pain and elbow pain. He denies and fevers or any drainage from the wounds. He states that he has a pain mgt appointment on Thursday the 28th that he would like to keep.\"    States that Dr. Kirby has been really busy but has appt in about a week or so.      He states he has been trying to stay out of pain.      Needs refill of biofreeze    Asking for steroid to help with pain    Also asking for home health order  They already called to set up, but " "were waiting until he saw me for me to order it.    He isn't sure of name of company    Objective   Vital Signs:   /82   Pulse 76   Temp 96.9 °F (36.1 °C)   Ht 182.9 cm (72\")   Wt 93.6 kg (206 lb 6.4 oz)   SpO2 100%   BMI 27.99 kg/m²     Physical Exam  Constitutional:       Appearance: Normal appearance.   Cardiovascular:      Rate and Rhythm: Normal rate and regular rhythm.   Pulmonary:      Effort: Pulmonary effort is normal.      Breath sounds: Normal breath sounds.   Skin:     General: Skin is warm and dry.      Comments: Sutures in place.  No erythema or streaking noted.  Drainage is noted on sock   Neurological:      Mental Status: He is alert and oriented to person, place, and time.   Psychiatric:         Mood and Affect: Mood normal.         Behavior: Behavior normal.         Thought Content: Thought content normal.         Judgment: Judgment normal.        Result Review :                 Assessment and Plan    Diagnoses and all orders for this visit:    1. Hospital discharge follow-up (Primary)  -     CBC & Differential  -     Comprehensive Metabolic Panel  -     Uric acid  -     Lactic Acid, Plasma  -     Ambulatory Referral to Home Health    2. Gouty arthropathy, chronic, with tophi  -     CBC & Differential  -     Comprehensive Metabolic Panel  -     Uric acid  -     Lactic Acid, Plasma  -     Ambulatory Referral to Home Health    3. Osteomyelitis of left foot, unspecified type (HCC)  -     CBC & Differential  -     Comprehensive Metabolic Panel  -     Uric acid  -     Lactic Acid, Plasma  -     Ambulatory Referral to Home Health    4. Chronic use of opiate for therapeutic purpose  -     CBC & Differential  -     Comprehensive Metabolic Panel  -     Uric acid  -     Lactic Acid, Plasma  -     Ambulatory Referral to Home Health    Other orders  -     Menthol, Topical Analgesic, (Biofreeze Roll-On) 4 % gel; Apply 1 application topically 4 (Four) Times a Day As Needed (pain).  Dispense: 118 mL; " Refill: 0  -     methylPREDNISolone (MEDROL) 4 MG dose pack; Take as directed on package instructions.  Dispense: 21 tablet; Refill: 0      Would like to recheck labs to make sure infection is improving.  He verbalized understanding and is agreeable.  Paranoid.  Patient to keep appointment next week with orthopedics specialist.  Referral made for home health.  Follow-up as needed.             Follow Up   No follow-ups on file.  Patient was given instructions and counseling regarding his condition or for health maintenance advice. Please see specific information pulled into the AVS if appropriate.

## 2022-05-05 ENCOUNTER — TELEPHONE (OUTPATIENT)
Dept: FAMILY MEDICINE CLINIC | Facility: CLINIC | Age: 52
End: 2022-05-05

## 2022-05-05 ENCOUNTER — HOME CARE VISIT (OUTPATIENT)
Dept: HOME HEALTH SERVICES | Facility: HOME HEALTHCARE | Age: 52
End: 2022-05-05

## 2022-05-05 LAB
ALBUMIN SERPL-MCNC: 3.1 G/DL (ref 3.8–4.9)
ALBUMIN/GLOB SERPL: 0.9 {RATIO} (ref 1.2–2.2)
ALP SERPL-CCNC: 89 IU/L (ref 44–121)
ALT SERPL-CCNC: 12 IU/L (ref 0–44)
AST SERPL-CCNC: 31 IU/L (ref 0–40)
BASOPHILS # BLD AUTO: 0.1 X10E3/UL (ref 0–0.2)
BASOPHILS NFR BLD AUTO: 1 %
BILIRUB SERPL-MCNC: 1 MG/DL (ref 0–1.2)
BUN SERPL-MCNC: 21 MG/DL (ref 6–24)
BUN/CREAT SERPL: 13 (ref 9–20)
CALCIUM SERPL-MCNC: 9.1 MG/DL (ref 8.7–10.2)
CHLORIDE SERPL-SCNC: 100 MMOL/L (ref 96–106)
CO2 SERPL-SCNC: 16 MMOL/L (ref 20–29)
CREAT SERPL-MCNC: 1.65 MG/DL (ref 0.76–1.27)
EGFRCR SERPLBLD CKD-EPI 2021: 50 ML/MIN/1.73
EOSINOPHIL # BLD AUTO: 0.3 X10E3/UL (ref 0–0.4)
EOSINOPHIL NFR BLD AUTO: 4 %
ERYTHROCYTE [DISTWIDTH] IN BLOOD BY AUTOMATED COUNT: 13.1 % (ref 11.6–15.4)
GLOBULIN SER CALC-MCNC: 3.5 G/DL (ref 1.5–4.5)
GLUCOSE SERPL-MCNC: 94 MG/DL (ref 65–99)
HCT VFR BLD AUTO: 25.9 % (ref 37.5–51)
HGB BLD-MCNC: 8.8 G/DL (ref 13–17.7)
IMM GRANULOCYTES # BLD AUTO: 0 X10E3/UL (ref 0–0.1)
IMM GRANULOCYTES NFR BLD AUTO: 0 %
LACTATE SERPL-MCNC: 20.5 MG/DL (ref 4.8–25.7)
LYMPHOCYTES # BLD AUTO: 1.5 X10E3/UL (ref 0.7–3.1)
LYMPHOCYTES NFR BLD AUTO: 18 %
MCH RBC QN AUTO: 33.2 PG (ref 26.6–33)
MCHC RBC AUTO-ENTMCNC: 34 G/DL (ref 31.5–35.7)
MCV RBC AUTO: 98 FL (ref 79–97)
MONOCYTES # BLD AUTO: 0.8 X10E3/UL (ref 0.1–0.9)
MONOCYTES NFR BLD AUTO: 10 %
NEUTROPHILS # BLD AUTO: 5.4 X10E3/UL (ref 1.4–7)
NEUTROPHILS NFR BLD AUTO: 67 %
PLATELET # BLD AUTO: 251 X10E3/UL (ref 150–450)
POTASSIUM SERPL-SCNC: 5.2 MMOL/L (ref 3.5–5.2)
PROT SERPL-MCNC: 6.6 G/DL (ref 6–8.5)
RBC # BLD AUTO: 2.65 X10E6/UL (ref 4.14–5.8)
SODIUM SERPL-SCNC: 134 MMOL/L (ref 134–144)
URATE SERPL-MCNC: 10.6 MG/DL (ref 3.8–8.4)
WBC # BLD AUTO: 8 X10E3/UL (ref 3.4–10.8)

## 2022-05-05 PROCEDURE — G0299 HHS/HOSPICE OF RN EA 15 MIN: HCPCS

## 2022-05-05 NOTE — PROGRESS NOTES
Please call the patient regarding his lab results.  Anemia has worsened.  Would like him to recheck either here or at orthopedic clinic in 1 week.

## 2022-05-05 NOTE — TELEPHONE ENCOUNTER
Please let patient know its not covered at this point.  He had asked me to prescribe it previously because he said it was covered.

## 2022-05-06 ENCOUNTER — HOME CARE VISIT (OUTPATIENT)
Dept: HOME HEALTH SERVICES | Facility: HOME HEALTHCARE | Age: 52
End: 2022-05-06

## 2022-05-06 ENCOUNTER — TELEPHONE (OUTPATIENT)
Dept: FAMILY MEDICINE CLINIC | Facility: CLINIC | Age: 52
End: 2022-05-06

## 2022-05-06 DIAGNOSIS — D50.0 IRON DEFICIENCY ANEMIA DUE TO CHRONIC BLOOD LOSS: Primary | ICD-10-CM

## 2022-05-08 VITALS
RESPIRATION RATE: 18 BRPM | TEMPERATURE: 97.5 F | OXYGEN SATURATION: 97 % | HEIGHT: 72 IN | SYSTOLIC BLOOD PRESSURE: 138 MMHG | WEIGHT: 196 LBS | BODY MASS INDEX: 26.55 KG/M2 | DIASTOLIC BLOOD PRESSURE: 64 MMHG | HEART RATE: 76 BPM

## 2022-05-09 NOTE — HOME HEALTH
Patient referred to [facility] following amputation of RT great toe.  Incision has sutures in place.  Patient has been changing dressing daily with assist of spouse.  He also has a wound to RT pinky toe site of previous amputation. I cleaned and dressed both wounds, and sent message to PCP that I would be maanging wound in home if she has any orders or labs she would like completed.  Patient lives with spouse and adult child in apartment.  He has a motorized whelelchair.  He has scheduled most appts, and will call to set up the rest.  Has obtained all meds in home.  Has a small amount of dressing supplies, I also brought him more to get him through until supplies arrive.  Order placed.  T/i infection prevention measures.

## 2022-05-10 ENCOUNTER — HOME CARE VISIT (OUTPATIENT)
Dept: HOME HEALTH SERVICES | Facility: HOME HEALTHCARE | Age: 52
End: 2022-05-10

## 2022-05-10 PROCEDURE — G0299 HHS/HOSPICE OF RN EA 15 MIN: HCPCS

## 2022-05-11 NOTE — CASE COMMUNICATION
THE FOLLOWING SUPPLIES WERE ORDERED 5/6/22:    SALINE - [id number] (1 BOX)  STERILE 2X2 - [id number] (1 BOX)  STERILE 4X4 - [id number] (1 BOX)  SMALL KERLIX  (12)  SMALL ACE WRAP (12)    ORDER #[id number]    *TRIPLE ANTIBIOTIC OINTMENT NOT COVERED BY INSURANCE

## 2022-05-12 VITALS
TEMPERATURE: 98.8 F | OXYGEN SATURATION: 96 % | SYSTOLIC BLOOD PRESSURE: 132 MMHG | HEART RATE: 78 BPM | DIASTOLIC BLOOD PRESSURE: 68 MMHG | RESPIRATION RATE: 16 BRPM

## 2022-05-13 ENCOUNTER — HOME CARE VISIT (OUTPATIENT)
Dept: HOME HEALTH SERVICES | Facility: HOME HEALTHCARE | Age: 52
End: 2022-05-13

## 2022-05-13 PROCEDURE — G0300 HHS/HOSPICE OF LPN EA 15 MIN: HCPCS

## 2022-05-16 NOTE — HOME HEALTH
Dressing changed and picture taken, tolerated well. No signs of distress. Performed dressing change in the hallway per patients request due to company being present at time of visit.

## 2022-05-17 ENCOUNTER — HOME CARE VISIT (OUTPATIENT)
Dept: HOME HEALTH SERVICES | Facility: HOME HEALTHCARE | Age: 52
End: 2022-05-17

## 2022-05-17 PROCEDURE — G0300 HHS/HOSPICE OF LPN EA 15 MIN: HCPCS

## 2022-05-18 ENCOUNTER — TELEPHONE (OUTPATIENT)
Dept: FAMILY MEDICINE CLINIC | Facility: CLINIC | Age: 52
End: 2022-05-18

## 2022-05-18 ENCOUNTER — HOME CARE VISIT (OUTPATIENT)
Dept: HOME HEALTH SERVICES | Facility: HOME HEALTHCARE | Age: 52
End: 2022-05-18

## 2022-05-18 NOTE — TELEPHONE ENCOUNTER
----- Message from Cierra Buchanan MA sent at 5/18/2022  9:12 AM EDT -----  Spoke with patient and notified him of results. Patient is wanting to know if you can refill his Medrol, and bio freeze roller one.

## 2022-05-18 NOTE — TELEPHONE ENCOUNTER
I will not refill steroid.  That was a one-time thing for inflammation.  I did send a refill on Biofreeze.  However, it looks like insurance will not cover at this point.

## 2022-05-19 VITALS
RESPIRATION RATE: 18 BRPM | OXYGEN SATURATION: 98 % | SYSTOLIC BLOOD PRESSURE: 128 MMHG | DIASTOLIC BLOOD PRESSURE: 80 MMHG | HEART RATE: 83 BPM | TEMPERATURE: 98.3 F

## 2022-05-19 RX ORDER — PANTOPRAZOLE SODIUM 40 MG/1
TABLET, DELAYED RELEASE ORAL
Qty: 90 TABLET | Refills: 0 | Status: SHIPPED | OUTPATIENT
Start: 2022-05-19 | End: 2022-07-11 | Stop reason: SDUPTHER

## 2022-05-19 RX ORDER — LOSARTAN POTASSIUM 50 MG/1
TABLET ORAL
Qty: 30 TABLET | Refills: 0 | Status: SHIPPED | OUTPATIENT
Start: 2022-05-19 | End: 2022-05-26

## 2022-05-19 RX ORDER — AMLODIPINE BESYLATE 5 MG/1
TABLET ORAL
Qty: 30 TABLET | Refills: 0 | Status: SHIPPED | OUTPATIENT
Start: 2022-05-19 | End: 2022-05-26

## 2022-05-19 NOTE — CASE COMMUNICATION
THE FOLLOWING SUPPLIES WERE ORDERED 5/18/22:    KERLIX - [id number] (14)  STERILE 4X4 - [id number] (1 BOX)  ACE WRAP - [id number] (14)    ORDER #[id number]

## 2022-05-20 ENCOUNTER — HOME CARE VISIT (OUTPATIENT)
Dept: HOME HEALTH SERVICES | Facility: HOME HEALTHCARE | Age: 52
End: 2022-05-20

## 2022-05-20 PROCEDURE — G0299 HHS/HOSPICE OF RN EA 15 MIN: HCPCS

## 2022-05-23 ENCOUNTER — TELEPHONE (OUTPATIENT)
Dept: FAMILY MEDICINE CLINIC | Facility: CLINIC | Age: 52
End: 2022-05-23

## 2022-05-23 VITALS
RESPIRATION RATE: 16 BRPM | OXYGEN SATURATION: 97 % | TEMPERATURE: 97.8 F | HEART RATE: 87 BPM | SYSTOLIC BLOOD PRESSURE: 130 MMHG | DIASTOLIC BLOOD PRESSURE: 68 MMHG

## 2022-05-23 NOTE — TELEPHONE ENCOUNTER
Fax came in for pt from [facility], I called pt and left a VM inquiring if pt was switching pharmacies.  Awaiting return call

## 2022-05-24 ENCOUNTER — HOME CARE VISIT (OUTPATIENT)
Dept: HOME HEALTH SERVICES | Facility: HOME HEALTHCARE | Age: 52
End: 2022-05-24

## 2022-05-24 PROCEDURE — G0300 HHS/HOSPICE OF LPN EA 15 MIN: HCPCS

## 2022-05-25 VITALS
DIASTOLIC BLOOD PRESSURE: 76 MMHG | TEMPERATURE: 97.9 F | SYSTOLIC BLOOD PRESSURE: 120 MMHG | RESPIRATION RATE: 18 BRPM | HEART RATE: 84 BPM | OXYGEN SATURATION: 99 %

## 2022-05-26 RX ORDER — AMLODIPINE BESYLATE 5 MG/1
TABLET ORAL
Qty: 30 TABLET | Refills: 0 | Status: SHIPPED | OUTPATIENT
Start: 2022-05-26 | End: 2022-06-20

## 2022-05-26 RX ORDER — LOSARTAN POTASSIUM 50 MG/1
TABLET ORAL
Qty: 30 TABLET | Refills: 0 | Status: SHIPPED | OUTPATIENT
Start: 2022-05-26 | End: 2022-06-20

## 2022-05-27 ENCOUNTER — HOME CARE VISIT (OUTPATIENT)
Dept: HOME HEALTH SERVICES | Facility: HOME HEALTHCARE | Age: 52
End: 2022-05-27

## 2022-05-27 PROCEDURE — G0300 HHS/HOSPICE OF LPN EA 15 MIN: HCPCS

## 2022-05-30 VITALS
TEMPERATURE: 97.9 F | DIASTOLIC BLOOD PRESSURE: 74 MMHG | OXYGEN SATURATION: 99 % | HEART RATE: 75 BPM | SYSTOLIC BLOOD PRESSURE: 126 MMHG | RESPIRATION RATE: 18 BRPM

## 2022-05-31 ENCOUNTER — HOME CARE VISIT (OUTPATIENT)
Dept: HOME HEALTH SERVICES | Facility: HOME HEALTHCARE | Age: 52
End: 2022-05-31

## 2022-05-31 PROCEDURE — G0300 HHS/HOSPICE OF LPN EA 15 MIN: HCPCS

## 2022-06-01 VITALS
TEMPERATURE: 98.4 F | DIASTOLIC BLOOD PRESSURE: 78 MMHG | SYSTOLIC BLOOD PRESSURE: 124 MMHG | RESPIRATION RATE: 18 BRPM | OXYGEN SATURATION: 99 % | HEART RATE: 79 BPM

## 2022-06-01 NOTE — HOME HEALTH
Tolerated dressing change well, no signs of distress. Provided patient with supplies at todays visit

## 2022-06-02 PROCEDURE — G0180 MD CERTIFICATION HHA PATIENT: HCPCS | Performed by: NURSE PRACTITIONER

## 2022-06-03 ENCOUNTER — TELEPHONE (OUTPATIENT)
Dept: FAMILY MEDICINE CLINIC | Facility: CLINIC | Age: 52
End: 2022-06-03

## 2022-06-03 ENCOUNTER — HOME CARE VISIT (OUTPATIENT)
Dept: HOME HEALTH SERVICES | Facility: HOME HEALTHCARE | Age: 52
End: 2022-06-03

## 2022-06-03 VITALS
OXYGEN SATURATION: 97 % | TEMPERATURE: 98.2 F | DIASTOLIC BLOOD PRESSURE: 82 MMHG | RESPIRATION RATE: 20 BRPM | HEART RATE: 76 BPM | SYSTOLIC BLOOD PRESSURE: 130 MMHG

## 2022-06-03 PROCEDURE — G0299 HHS/HOSPICE OF RN EA 15 MIN: HCPCS

## 2022-06-03 NOTE — TELEPHONE ENCOUNTER
Shena from [facility] [phone] called and stated pt has another wound on the right Foot on Right lateral side,(same foot great toe was amputated from)  Stated both wounds are very slothy,  She wants to change dressing to iotasorb Gel dressing twice a week, she is also asking for verbal orders for 2 more weeks of visits.  Please advise

## 2022-06-03 NOTE — HOME HEALTH
Patients wound more painful today. New area noted to right lateral foot.  New wound care orders obtained from [facility].  Iodosorb gel to both wounds  cover with dry sterile dressing,   wrap foot with gauze roll,   secure with ace wrap. Change 2 times a week, and prn for drainage.   Patient has betadine in the home, and need to do daily dressing changes until new wound care supplies arrive.

## 2022-06-06 ENCOUNTER — HOME CARE VISIT (OUTPATIENT)
Dept: HOME HEALTH SERVICES | Facility: HOME HEALTHCARE | Age: 52
End: 2022-06-06

## 2022-06-06 NOTE — CASE COMMUNICATION
THE FOLLOWING SUPPLIES WERE ORDERED 6/6/22:    KERLIX - [id number] (18)  ACE WRAPS - [id number] (12)  NON-STERILE 4X4 GAUZE - [id number] (1 SLEEVE)    ORDER #[id number]

## 2022-06-07 ENCOUNTER — HOME CARE VISIT (OUTPATIENT)
Dept: HOME HEALTH SERVICES | Facility: HOME HEALTHCARE | Age: 52
End: 2022-06-07

## 2022-06-07 PROCEDURE — G0299 HHS/HOSPICE OF RN EA 15 MIN: HCPCS

## 2022-06-10 ENCOUNTER — HOME CARE VISIT (OUTPATIENT)
Dept: HOME HEALTH SERVICES | Facility: HOME HEALTHCARE | Age: 52
End: 2022-06-10

## 2022-06-10 VITALS
RESPIRATION RATE: 16 BRPM | TEMPERATURE: 98.1 F | SYSTOLIC BLOOD PRESSURE: 122 MMHG | DIASTOLIC BLOOD PRESSURE: 70 MMHG | OXYGEN SATURATION: 99 % | HEART RATE: 78 BPM

## 2022-06-10 PROCEDURE — G0299 HHS/HOSPICE OF RN EA 15 MIN: HCPCS

## 2022-06-13 VITALS
SYSTOLIC BLOOD PRESSURE: 126 MMHG | HEART RATE: 64 BPM | DIASTOLIC BLOOD PRESSURE: 78 MMHG | RESPIRATION RATE: 18 BRPM | OXYGEN SATURATION: 99 % | TEMPERATURE: 97.1 F

## 2022-06-15 ENCOUNTER — HOME CARE VISIT (OUTPATIENT)
Dept: HOME HEALTH SERVICES | Facility: HOME HEALTHCARE | Age: 52
End: 2022-06-15

## 2022-06-15 PROCEDURE — G0299 HHS/HOSPICE OF RN EA 15 MIN: HCPCS

## 2022-06-16 RX ORDER — CITALOPRAM 20 MG/1
TABLET ORAL
Qty: 30 TABLET | Refills: 1 | Status: SHIPPED | OUTPATIENT
Start: 2022-06-16 | End: 2022-07-11 | Stop reason: SDUPTHER

## 2022-06-17 ENCOUNTER — HOME CARE VISIT (OUTPATIENT)
Dept: HOME HEALTH SERVICES | Facility: HOME HEALTHCARE | Age: 52
End: 2022-06-17

## 2022-06-17 PROCEDURE — G0299 HHS/HOSPICE OF RN EA 15 MIN: HCPCS

## 2022-06-20 VITALS
HEART RATE: 78 BPM | OXYGEN SATURATION: 98 % | TEMPERATURE: 97.8 F | RESPIRATION RATE: 16 BRPM | DIASTOLIC BLOOD PRESSURE: 70 MMHG | SYSTOLIC BLOOD PRESSURE: 122 MMHG

## 2022-06-20 VITALS
HEART RATE: 89 BPM | DIASTOLIC BLOOD PRESSURE: 70 MMHG | SYSTOLIC BLOOD PRESSURE: 128 MMHG | TEMPERATURE: 97.3 F | RESPIRATION RATE: 16 BRPM | OXYGEN SATURATION: 99 %

## 2022-06-20 RX ORDER — LOSARTAN POTASSIUM 50 MG/1
TABLET ORAL
Qty: 30 TABLET | Refills: 2 | Status: SHIPPED | OUTPATIENT
Start: 2022-06-20 | End: 2022-07-11 | Stop reason: SDUPTHER

## 2022-06-20 RX ORDER — AMLODIPINE BESYLATE 5 MG/1
TABLET ORAL
Qty: 30 TABLET | Refills: 2 | Status: SHIPPED | OUTPATIENT
Start: 2022-06-20 | End: 2022-07-11 | Stop reason: SDUPTHER

## 2022-06-21 ENCOUNTER — HOME CARE VISIT (OUTPATIENT)
Dept: HOME HEALTH SERVICES | Facility: HOME HEALTHCARE | Age: 52
End: 2022-06-21

## 2022-06-24 ENCOUNTER — TELEPHONE (OUTPATIENT)
Dept: FAMILY MEDICINE CLINIC | Facility: CLINIC | Age: 52
End: 2022-06-24

## 2022-06-24 ENCOUNTER — HOME CARE VISIT (OUTPATIENT)
Dept: HOME HEALTH SERVICES | Facility: HOME HEALTHCARE | Age: 52
End: 2022-06-24

## 2022-06-24 PROCEDURE — G0299 HHS/HOSPICE OF RN EA 15 MIN: HCPCS

## 2022-06-27 VITALS
OXYGEN SATURATION: 97 % | HEART RATE: 68 BPM | TEMPERATURE: 98.1 F | RESPIRATION RATE: 16 BRPM | SYSTOLIC BLOOD PRESSURE: 120 MMHG | DIASTOLIC BLOOD PRESSURE: 66 MMHG

## 2022-06-28 ENCOUNTER — HOME CARE VISIT (OUTPATIENT)
Dept: HOME HEALTH SERVICES | Facility: HOME HEALTHCARE | Age: 52
End: 2022-06-28

## 2022-06-28 VITALS
SYSTOLIC BLOOD PRESSURE: 128 MMHG | TEMPERATURE: 98.5 F | DIASTOLIC BLOOD PRESSURE: 76 MMHG | OXYGEN SATURATION: 99 % | RESPIRATION RATE: 19 BRPM | HEART RATE: 82 BPM

## 2022-06-28 PROCEDURE — G0300 HHS/HOSPICE OF LPN EA 15 MIN: HCPCS

## 2022-06-29 NOTE — HOME HEALTH
Patient stated that supplies had not arrived yet, provided some supplies from car. Picture taken at visit, tolerated wound dressing change well. Patient stated that he is tolerating antibiotic well.

## 2022-07-01 ENCOUNTER — HOME CARE VISIT (OUTPATIENT)
Dept: HOME HEALTH SERVICES | Facility: HOME HEALTHCARE | Age: 52
End: 2022-07-01

## 2022-07-01 PROCEDURE — G0299 HHS/HOSPICE OF RN EA 15 MIN: HCPCS

## 2022-07-05 ENCOUNTER — HOME CARE VISIT (OUTPATIENT)
Dept: HOME HEALTH SERVICES | Facility: HOME HEALTHCARE | Age: 52
End: 2022-07-05

## 2022-07-05 VITALS
TEMPERATURE: 98.8 F | RESPIRATION RATE: 18 BRPM | HEART RATE: 71 BPM | SYSTOLIC BLOOD PRESSURE: 128 MMHG | OXYGEN SATURATION: 98 % | DIASTOLIC BLOOD PRESSURE: 76 MMHG

## 2022-07-05 NOTE — CASE COMMUNICATION
THE FOLLOWING SUPPLIES WERE ORDERED 7/5/22:    2X2 GAUZE - [id number] (2)  ELASTIC BANDAGE - [id number] (10)  KERLIX - [id number] (10)  5X9 ABD PADS - [id number] (1 BOX)    ORDER #[id number]

## 2022-07-06 ENCOUNTER — HOME CARE VISIT (OUTPATIENT)
Dept: HOME HEALTH SERVICES | Facility: HOME HEALTHCARE | Age: 52
End: 2022-07-06

## 2022-07-06 PROCEDURE — G0299 HHS/HOSPICE OF RN EA 15 MIN: HCPCS

## 2022-07-08 ENCOUNTER — HOME CARE VISIT (OUTPATIENT)
Dept: HOME HEALTH SERVICES | Facility: HOME HEALTHCARE | Age: 52
End: 2022-07-08

## 2022-07-08 PROCEDURE — G0299 HHS/HOSPICE OF RN EA 15 MIN: HCPCS

## 2022-07-11 VITALS
DIASTOLIC BLOOD PRESSURE: 72 MMHG | HEART RATE: 88 BPM | SYSTOLIC BLOOD PRESSURE: 126 MMHG | OXYGEN SATURATION: 98 % | TEMPERATURE: 98.1 F | RESPIRATION RATE: 16 BRPM

## 2022-07-11 VITALS
DIASTOLIC BLOOD PRESSURE: 76 MMHG | SYSTOLIC BLOOD PRESSURE: 118 MMHG | RESPIRATION RATE: 16 BRPM | TEMPERATURE: 97.8 F | OXYGEN SATURATION: 97 % | HEART RATE: 87 BPM

## 2022-07-11 RX ORDER — AMLODIPINE BESYLATE 5 MG/1
5 TABLET ORAL DAILY
Qty: 30 TABLET | Refills: 2 | Status: SHIPPED | OUTPATIENT
Start: 2022-07-11 | End: 2022-08-14 | Stop reason: HOSPADM

## 2022-07-11 RX ORDER — PANTOPRAZOLE SODIUM 40 MG/1
40 TABLET, DELAYED RELEASE ORAL DAILY
Qty: 90 TABLET | Refills: 2 | Status: SHIPPED | OUTPATIENT
Start: 2022-07-11 | End: 2022-07-18

## 2022-07-11 RX ORDER — CITALOPRAM 20 MG/1
20 TABLET ORAL DAILY
Qty: 90 TABLET | Refills: 2 | Status: ON HOLD | OUTPATIENT
Start: 2022-07-11 | End: 2022-08-11

## 2022-07-11 RX ORDER — LOSARTAN POTASSIUM 50 MG/1
50 TABLET ORAL DAILY
Qty: 90 TABLET | Refills: 2 | Status: SHIPPED | OUTPATIENT
Start: 2022-07-11 | End: 2022-08-14 | Stop reason: HOSPADM

## 2022-07-11 RX ORDER — CELECOXIB 400 MG/1
400 CAPSULE ORAL DAILY
Qty: 90 CAPSULE | Refills: 2 | OUTPATIENT
Start: 2022-07-11

## 2022-07-11 RX ORDER — CARVEDILOL 25 MG/1
25 TABLET ORAL 2 TIMES DAILY
Qty: 180 TABLET | Refills: 2 | Status: SHIPPED | OUTPATIENT
Start: 2022-07-11 | End: 2022-07-18

## 2022-07-11 RX ORDER — ATORVASTATIN CALCIUM 80 MG/1
80 TABLET, FILM COATED ORAL DAILY
Qty: 90 TABLET | Refills: 2 | Status: SHIPPED | OUTPATIENT
Start: 2022-07-11 | End: 2022-08-14 | Stop reason: HOSPADM

## 2022-07-11 RX ORDER — MELOXICAM 15 MG/1
15 TABLET ORAL DAILY
Qty: 90 TABLET | Refills: 2 | Status: SHIPPED | OUTPATIENT
Start: 2022-07-11 | End: 2022-08-14 | Stop reason: HOSPADM

## 2022-07-12 ENCOUNTER — HOME CARE VISIT (OUTPATIENT)
Dept: HOME HEALTH SERVICES | Facility: HOME HEALTHCARE | Age: 52
End: 2022-07-12

## 2022-07-12 VITALS
SYSTOLIC BLOOD PRESSURE: 128 MMHG | TEMPERATURE: 98.2 F | RESPIRATION RATE: 16 BRPM | DIASTOLIC BLOOD PRESSURE: 70 MMHG | HEART RATE: 87 BPM | OXYGEN SATURATION: 99 %

## 2022-07-12 PROCEDURE — G0299 HHS/HOSPICE OF RN EA 15 MIN: HCPCS

## 2022-07-15 ENCOUNTER — HOME CARE VISIT (OUTPATIENT)
Dept: HOME HEALTH SERVICES | Facility: HOME HEALTHCARE | Age: 52
End: 2022-07-15

## 2022-07-15 PROCEDURE — G0299 HHS/HOSPICE OF RN EA 15 MIN: HCPCS

## 2022-07-18 VITALS
DIASTOLIC BLOOD PRESSURE: 68 MMHG | TEMPERATURE: 98.9 F | RESPIRATION RATE: 18 BRPM | SYSTOLIC BLOOD PRESSURE: 130 MMHG | HEART RATE: 76 BPM | OXYGEN SATURATION: 99 %

## 2022-07-18 RX ORDER — CARVEDILOL 25 MG/1
TABLET ORAL
Qty: 180 TABLET | Refills: 0 | Status: SHIPPED | OUTPATIENT
Start: 2022-07-18 | End: 2022-08-14 | Stop reason: HOSPADM

## 2022-07-18 RX ORDER — PANTOPRAZOLE SODIUM 40 MG/1
TABLET, DELAYED RELEASE ORAL
Qty: 90 TABLET | Refills: 0 | Status: SHIPPED | OUTPATIENT
Start: 2022-07-18 | End: 2022-08-14 | Stop reason: HOSPADM

## 2022-07-19 ENCOUNTER — HOME CARE VISIT (OUTPATIENT)
Dept: HOME HEALTH SERVICES | Facility: HOME HEALTHCARE | Age: 52
End: 2022-07-19

## 2022-07-19 VITALS
RESPIRATION RATE: 18 BRPM | DIASTOLIC BLOOD PRESSURE: 70 MMHG | HEART RATE: 89 BPM | TEMPERATURE: 97.3 F | SYSTOLIC BLOOD PRESSURE: 128 MMHG | OXYGEN SATURATION: 99 %

## 2022-07-19 PROCEDURE — G0299 HHS/HOSPICE OF RN EA 15 MIN: HCPCS

## 2022-07-20 PROCEDURE — G0179 MD RECERTIFICATION HHA PT: HCPCS | Performed by: NURSE PRACTITIONER

## 2022-07-22 ENCOUNTER — HOME CARE VISIT (OUTPATIENT)
Dept: HOME HEALTH SERVICES | Facility: HOME HEALTHCARE | Age: 52
End: 2022-07-22

## 2022-07-22 PROCEDURE — G0299 HHS/HOSPICE OF RN EA 15 MIN: HCPCS

## 2022-07-24 VITALS
RESPIRATION RATE: 18 BRPM | DIASTOLIC BLOOD PRESSURE: 70 MMHG | HEART RATE: 78 BPM | SYSTOLIC BLOOD PRESSURE: 124 MMHG | TEMPERATURE: 98.1 F | OXYGEN SATURATION: 99 %

## 2022-07-27 ENCOUNTER — HOME CARE VISIT (OUTPATIENT)
Dept: HOME HEALTH SERVICES | Facility: HOME HEALTHCARE | Age: 52
End: 2022-07-27

## 2022-07-27 PROCEDURE — G0299 HHS/HOSPICE OF RN EA 15 MIN: HCPCS

## 2022-07-29 ENCOUNTER — HOME CARE VISIT (OUTPATIENT)
Dept: HOME HEALTH SERVICES | Facility: HOME HEALTHCARE | Age: 52
End: 2022-07-29

## 2022-07-29 PROCEDURE — G0299 HHS/HOSPICE OF RN EA 15 MIN: HCPCS

## 2022-07-30 ENCOUNTER — HOME CARE VISIT (OUTPATIENT)
Dept: HOME HEALTH SERVICES | Facility: HOME HEALTHCARE | Age: 52
End: 2022-07-30

## 2022-07-31 ENCOUNTER — APPOINTMENT (OUTPATIENT)
Dept: CT IMAGING | Facility: HOSPITAL | Age: 52
End: 2022-07-31

## 2022-07-31 ENCOUNTER — HOSPITAL ENCOUNTER (INPATIENT)
Facility: HOSPITAL | Age: 52
LOS: 14 days | Discharge: NURSING, REHAB OR LONG-TERM CARE | End: 2022-08-14
Attending: EMERGENCY MEDICINE | Admitting: INTERNAL MEDICINE

## 2022-07-31 ENCOUNTER — APPOINTMENT (OUTPATIENT)
Dept: GENERAL RADIOLOGY | Facility: HOSPITAL | Age: 52
End: 2022-07-31

## 2022-07-31 VITALS
DIASTOLIC BLOOD PRESSURE: 70 MMHG | OXYGEN SATURATION: 99 % | TEMPERATURE: 97.1 F | RESPIRATION RATE: 16 BRPM | SYSTOLIC BLOOD PRESSURE: 122 MMHG | HEART RATE: 89 BPM

## 2022-07-31 DIAGNOSIS — N19 UREMIC ENCEPHALOPATHY: ICD-10-CM

## 2022-07-31 DIAGNOSIS — G93.49 UREMIC ENCEPHALOPATHY: ICD-10-CM

## 2022-07-31 DIAGNOSIS — E83.42 HYPOMAGNESEMIA: ICD-10-CM

## 2022-07-31 DIAGNOSIS — N17.9 ACUTE KIDNEY INJURY: ICD-10-CM

## 2022-07-31 DIAGNOSIS — K70.30 ALCOHOLIC CIRRHOSIS OF LIVER WITHOUT ASCITES: ICD-10-CM

## 2022-07-31 DIAGNOSIS — K70.30 ALCOHOLIC CIRRHOSIS, UNSPECIFIED WHETHER ASCITES PRESENT: ICD-10-CM

## 2022-07-31 DIAGNOSIS — M1A.9XX1 TOPHACEOUS GOUT: ICD-10-CM

## 2022-07-31 DIAGNOSIS — K76.82 HEPATIC ENCEPHALOPATHY: Primary | ICD-10-CM

## 2022-07-31 LAB
ALBUMIN SERPL-MCNC: 3.9 G/DL (ref 3.5–5.2)
ALBUMIN/GLOB SERPL: 1.1 G/DL
ALP SERPL-CCNC: 87 U/L (ref 39–117)
ALT SERPL W P-5'-P-CCNC: 9 U/L (ref 1–41)
AMMONIA BLD-SCNC: 68 UMOL/L (ref 16–60)
AMPHET+METHAMPHET UR QL: NEGATIVE
ANION GAP SERPL CALCULATED.3IONS-SCNC: 18.8 MMOL/L (ref 5–15)
ARTERIAL PATENCY WRIST A: POSITIVE
AST SERPL-CCNC: 36 U/L (ref 1–40)
ATMOSPHERIC PRESS: 753.2 MMHG
BACTERIA UR QL AUTO: ABNORMAL /HPF
BARBITURATES UR QL SCN: NEGATIVE
BASE EXCESS BLDA CALC-SCNC: -6.6 MMOL/L (ref 0–2)
BASOPHILS # BLD AUTO: 0.08 10*3/MM3 (ref 0–0.2)
BASOPHILS NFR BLD AUTO: 1.3 % (ref 0–1.5)
BDY SITE: ABNORMAL
BENZODIAZ UR QL SCN: NEGATIVE
BILIRUB SERPL-MCNC: 1.5 MG/DL (ref 0–1.2)
BILIRUB UR QL STRIP: NEGATIVE
BUN SERPL-MCNC: 29 MG/DL (ref 6–20)
BUN/CREAT SERPL: 10.4 (ref 7–25)
CALCIUM SPEC-SCNC: 9.5 MG/DL (ref 8.6–10.5)
CANNABINOIDS SERPL QL: POSITIVE
CHLORIDE SERPL-SCNC: 108 MMOL/L (ref 98–107)
CLARITY UR: ABNORMAL
CO2 SERPL-SCNC: 15.2 MMOL/L (ref 22–29)
COCAINE UR QL: NEGATIVE
COLOR UR: YELLOW
CREAT SERPL-MCNC: 2.79 MG/DL (ref 0.76–1.27)
DEPRECATED RDW RBC AUTO: 49.6 FL (ref 37–54)
EGFRCR SERPLBLD CKD-EPI 2021: 26.6 ML/MIN/1.73
EOSINOPHIL # BLD AUTO: 0.14 10*3/MM3 (ref 0–0.4)
EOSINOPHIL NFR BLD AUTO: 2.3 % (ref 0.3–6.2)
ERYTHROCYTE [DISTWIDTH] IN BLOOD BY AUTOMATED COUNT: 15 % (ref 12.3–15.4)
ETHANOL BLD-MCNC: <10 MG/DL (ref 0–10)
ETHANOL UR QL: <0.01 %
GLOBULIN UR ELPH-MCNC: 3.6 GM/DL
GLUCOSE BLDC GLUCOMTR-MCNC: 93 MG/DL (ref 70–130)
GLUCOSE SERPL-MCNC: 85 MG/DL (ref 65–99)
GLUCOSE UR STRIP-MCNC: NEGATIVE MG/DL
HCO3 BLDA-SCNC: 15.4 MMOL/L (ref 22–28)
HCT VFR BLD AUTO: 38.7 % (ref 37.5–51)
HGB BLD-MCNC: 13.3 G/DL (ref 13–17.7)
HGB UR QL STRIP.AUTO: ABNORMAL
HYALINE CASTS UR QL AUTO: ABNORMAL /LPF
IMM GRANULOCYTES # BLD AUTO: 0.06 10*3/MM3 (ref 0–0.05)
IMM GRANULOCYTES NFR BLD AUTO: 1 % (ref 0–0.5)
INHALED O2 CONCENTRATION: 21 %
INR PPP: 1.27 (ref 0.9–1.1)
KETONES UR QL STRIP: ABNORMAL
LEUKOCYTE ESTERASE UR QL STRIP.AUTO: ABNORMAL
LYMPHOCYTES # BLD AUTO: 1.36 10*3/MM3 (ref 0.7–3.1)
LYMPHOCYTES NFR BLD AUTO: 22.1 % (ref 19.6–45.3)
MAGNESIUM SERPL-MCNC: 1.2 MG/DL (ref 1.6–2.6)
MCH RBC QN AUTO: 31.4 PG (ref 26.6–33)
MCHC RBC AUTO-ENTMCNC: 34.4 G/DL (ref 31.5–35.7)
MCV RBC AUTO: 91.3 FL (ref 79–97)
METHADONE UR QL SCN: NEGATIVE
MODALITY: ABNORMAL
MONOCYTES # BLD AUTO: 0.57 10*3/MM3 (ref 0.1–0.9)
MONOCYTES NFR BLD AUTO: 9.3 % (ref 5–12)
NEUTROPHILS NFR BLD AUTO: 3.93 10*3/MM3 (ref 1.7–7)
NEUTROPHILS NFR BLD AUTO: 64 % (ref 42.7–76)
NITRITE UR QL STRIP: NEGATIVE
NRBC BLD AUTO-RTO: 0 /100 WBC (ref 0–0.2)
O2 A-A PPRESDIFF RESPIRATORY: 0.7 MMHG
OPIATES UR QL: NEGATIVE
OXYCODONE UR QL SCN: POSITIVE
PCO2 BLDA: 22.4 MM HG (ref 35–45)
PH BLDA: 7.45 PH UNITS (ref 7.35–7.45)
PH UR STRIP.AUTO: 5.5 [PH] (ref 5–8)
PLATELET # BLD AUTO: 161 10*3/MM3 (ref 140–450)
PMV BLD AUTO: 10.3 FL (ref 6–12)
PO2 BLDA: 87.7 MM HG (ref 80–100)
POTASSIUM SERPL-SCNC: 4.6 MMOL/L (ref 3.5–5.2)
PROT SERPL-MCNC: 7.5 G/DL (ref 6–8.5)
PROT UR QL STRIP: ABNORMAL
PROTHROMBIN TIME: 15.7 SECONDS (ref 11.7–14.2)
RBC # BLD AUTO: 4.24 10*6/MM3 (ref 4.14–5.8)
RBC # UR STRIP: ABNORMAL /HPF
REF LAB TEST METHOD: ABNORMAL
SAO2 % BLDCOA: 97.4 % (ref 92–99)
SODIUM SERPL-SCNC: 142 MMOL/L (ref 136–145)
SP GR UR STRIP: 1.01 (ref 1–1.03)
SQUAMOUS #/AREA URNS HPF: ABNORMAL /HPF
TOTAL RATE: 14 BREATHS/MINUTE
TROPONIN T SERPL-MCNC: <0.01 NG/ML (ref 0–0.03)
UROBILINOGEN UR QL STRIP: ABNORMAL
WBC # UR STRIP: ABNORMAL /HPF
WBC NRBC COR # BLD: 6.14 10*3/MM3 (ref 3.4–10.8)

## 2022-07-31 PROCEDURE — 85610 PROTHROMBIN TIME: CPT | Performed by: EMERGENCY MEDICINE

## 2022-07-31 PROCEDURE — 80307 DRUG TEST PRSMV CHEM ANLYZR: CPT | Performed by: EMERGENCY MEDICINE

## 2022-07-31 PROCEDURE — 81001 URINALYSIS AUTO W/SCOPE: CPT | Performed by: EMERGENCY MEDICINE

## 2022-07-31 PROCEDURE — 84300 ASSAY OF URINE SODIUM: CPT | Performed by: INTERNAL MEDICINE

## 2022-07-31 PROCEDURE — 87086 URINE CULTURE/COLONY COUNT: CPT | Performed by: INTERNAL MEDICINE

## 2022-07-31 PROCEDURE — 85025 COMPLETE CBC W/AUTO DIFF WBC: CPT | Performed by: EMERGENCY MEDICINE

## 2022-07-31 PROCEDURE — 84484 ASSAY OF TROPONIN QUANT: CPT | Performed by: EMERGENCY MEDICINE

## 2022-07-31 PROCEDURE — 71045 X-RAY EXAM CHEST 1 VIEW: CPT

## 2022-07-31 PROCEDURE — U0004 COV-19 TEST NON-CDC HGH THRU: HCPCS | Performed by: INTERNAL MEDICINE

## 2022-07-31 PROCEDURE — 93005 ELECTROCARDIOGRAM TRACING: CPT | Performed by: EMERGENCY MEDICINE

## 2022-07-31 PROCEDURE — 82140 ASSAY OF AMMONIA: CPT | Performed by: EMERGENCY MEDICINE

## 2022-07-31 PROCEDURE — 82570 ASSAY OF URINE CREATININE: CPT | Performed by: INTERNAL MEDICINE

## 2022-07-31 PROCEDURE — 80053 COMPREHEN METABOLIC PANEL: CPT | Performed by: EMERGENCY MEDICINE

## 2022-07-31 PROCEDURE — 36600 WITHDRAWAL OF ARTERIAL BLOOD: CPT

## 2022-07-31 PROCEDURE — 99284 EMERGENCY DEPT VISIT MOD MDM: CPT

## 2022-07-31 PROCEDURE — 82077 ASSAY SPEC XCP UR&BREATH IA: CPT | Performed by: EMERGENCY MEDICINE

## 2022-07-31 PROCEDURE — 25010000002 MAGNESIUM SULFATE 2 GM/50ML SOLUTION: Performed by: EMERGENCY MEDICINE

## 2022-07-31 PROCEDURE — 83735 ASSAY OF MAGNESIUM: CPT | Performed by: EMERGENCY MEDICINE

## 2022-07-31 PROCEDURE — 93010 ELECTROCARDIOGRAM REPORT: CPT | Performed by: INTERNAL MEDICINE

## 2022-07-31 PROCEDURE — 25010000002 CEFTRIAXONE PER 250 MG: Performed by: INTERNAL MEDICINE

## 2022-07-31 PROCEDURE — 82962 GLUCOSE BLOOD TEST: CPT

## 2022-07-31 PROCEDURE — 82803 BLOOD GASES ANY COMBINATION: CPT

## 2022-07-31 PROCEDURE — 74176 CT ABD & PELVIS W/O CONTRAST: CPT

## 2022-07-31 PROCEDURE — 70450 CT HEAD/BRAIN W/O DYE: CPT

## 2022-07-31 RX ORDER — CELECOXIB 200 MG/1
400 CAPSULE ORAL DAILY
COMMUNITY
End: 2022-08-14 | Stop reason: HOSPADM

## 2022-07-31 RX ORDER — NITROGLYCERIN 0.4 MG/1
0.4 TABLET SUBLINGUAL
Status: DISCONTINUED | OUTPATIENT
Start: 2022-07-31 | End: 2022-08-14 | Stop reason: HOSPADM

## 2022-07-31 RX ORDER — SODIUM CHLORIDE 0.9 % (FLUSH) 0.9 %
10 SYRINGE (ML) INJECTION EVERY 12 HOURS SCHEDULED
Status: DISCONTINUED | OUTPATIENT
Start: 2022-07-31 | End: 2022-08-14 | Stop reason: HOSPADM

## 2022-07-31 RX ORDER — ALLOPURINOL 100 MG/1
100 TABLET ORAL DAILY
Status: DISCONTINUED | OUTPATIENT
Start: 2022-08-01 | End: 2022-08-14 | Stop reason: HOSPADM

## 2022-07-31 RX ORDER — PANTOPRAZOLE SODIUM 40 MG/1
40 TABLET, DELAYED RELEASE ORAL EVERY MORNING
Status: DISCONTINUED | OUTPATIENT
Start: 2022-08-01 | End: 2022-08-05

## 2022-07-31 RX ORDER — ACETAMINOPHEN 160 MG/5ML
650 SOLUTION ORAL EVERY 4 HOURS PRN
Status: DISCONTINUED | OUTPATIENT
Start: 2022-07-31 | End: 2022-08-14 | Stop reason: HOSPADM

## 2022-07-31 RX ORDER — DIAZEPAM 5 MG/ML
10 INJECTION, SOLUTION INTRAMUSCULAR; INTRAVENOUS
Status: DISCONTINUED | OUTPATIENT
Start: 2022-07-31 | End: 2022-08-07

## 2022-07-31 RX ORDER — ACETAMINOPHEN 650 MG/1
650 SUPPOSITORY RECTAL EVERY 4 HOURS PRN
Status: DISCONTINUED | OUTPATIENT
Start: 2022-07-31 | End: 2022-08-14 | Stop reason: HOSPADM

## 2022-07-31 RX ORDER — SODIUM CHLORIDE 0.9 % (FLUSH) 0.9 %
10 SYRINGE (ML) INJECTION AS NEEDED
Status: DISCONTINUED | OUTPATIENT
Start: 2022-07-31 | End: 2022-08-14 | Stop reason: HOSPADM

## 2022-07-31 RX ORDER — DIAZEPAM 5 MG/ML
5 INJECTION, SOLUTION INTRAMUSCULAR; INTRAVENOUS
Status: DISCONTINUED | OUTPATIENT
Start: 2022-07-31 | End: 2022-08-07

## 2022-07-31 RX ORDER — ACETAMINOPHEN 325 MG/1
650 TABLET ORAL EVERY 4 HOURS PRN
Status: DISCONTINUED | OUTPATIENT
Start: 2022-07-31 | End: 2022-08-14 | Stop reason: HOSPADM

## 2022-07-31 RX ORDER — CARVEDILOL 12.5 MG/1
12.5 TABLET ORAL 2 TIMES DAILY WITH MEALS
Status: DISCONTINUED | OUTPATIENT
Start: 2022-08-01 | End: 2022-08-14 | Stop reason: HOSPADM

## 2022-07-31 RX ORDER — DIPHENOXYLATE HYDROCHLORIDE AND ATROPINE SULFATE 2.5; .025 MG/1; MG/1
1 TABLET ORAL DAILY
Status: DISCONTINUED | OUTPATIENT
Start: 2022-08-01 | End: 2022-08-01

## 2022-07-31 RX ORDER — MAGNESIUM SULFATE HEPTAHYDRATE 40 MG/ML
2 INJECTION, SOLUTION INTRAVENOUS ONCE
Status: COMPLETED | OUTPATIENT
Start: 2022-07-31 | End: 2022-07-31

## 2022-07-31 RX ORDER — ONDANSETRON 4 MG/1
4 TABLET, FILM COATED ORAL EVERY 6 HOURS PRN
Status: DISCONTINUED | OUTPATIENT
Start: 2022-07-31 | End: 2022-08-14 | Stop reason: HOSPADM

## 2022-07-31 RX ORDER — LACTULOSE 10 G/15ML
20 SOLUTION ORAL DAILY
Status: DISCONTINUED | OUTPATIENT
Start: 2022-07-31 | End: 2022-07-31

## 2022-07-31 RX ORDER — SODIUM CHLORIDE 9 MG/ML
125 INJECTION, SOLUTION INTRAVENOUS CONTINUOUS
Status: DISCONTINUED | OUTPATIENT
Start: 2022-07-31 | End: 2022-08-01

## 2022-07-31 RX ORDER — ONDANSETRON 2 MG/ML
4 INJECTION INTRAMUSCULAR; INTRAVENOUS EVERY 6 HOURS PRN
Status: DISCONTINUED | OUTPATIENT
Start: 2022-07-31 | End: 2022-08-14 | Stop reason: HOSPADM

## 2022-07-31 RX ORDER — FOLIC ACID 1 MG/1
1 TABLET ORAL DAILY
Status: DISCONTINUED | OUTPATIENT
Start: 2022-08-01 | End: 2022-08-01

## 2022-07-31 RX ORDER — CITALOPRAM 20 MG/1
20 TABLET ORAL DAILY
Status: DISCONTINUED | OUTPATIENT
Start: 2022-08-01 | End: 2022-08-14 | Stop reason: HOSPADM

## 2022-07-31 RX ORDER — ZINC SULFATE 50(220)MG
220 CAPSULE ORAL DAILY
Status: DISCONTINUED | OUTPATIENT
Start: 2022-08-01 | End: 2022-08-14 | Stop reason: HOSPADM

## 2022-07-31 RX ORDER — SODIUM CHLORIDE 9 MG/ML
100 INJECTION, SOLUTION INTRAVENOUS CONTINUOUS
Status: DISCONTINUED | OUTPATIENT
Start: 2022-07-31 | End: 2022-07-31

## 2022-07-31 RX ORDER — LACTULOSE 10 G/15ML
30 SOLUTION ORAL 3 TIMES DAILY
Status: DISCONTINUED | OUTPATIENT
Start: 2022-07-31 | End: 2022-08-03

## 2022-07-31 RX ORDER — LORAZEPAM 1 MG/1
1 TABLET ORAL
Status: DISCONTINUED | OUTPATIENT
Start: 2022-07-31 | End: 2022-08-07

## 2022-07-31 RX ORDER — MAGNESIUM SULFATE HEPTAHYDRATE 40 MG/ML
4 INJECTION, SOLUTION INTRAVENOUS ONCE
Status: COMPLETED | OUTPATIENT
Start: 2022-07-31 | End: 2022-08-01

## 2022-07-31 RX ORDER — LORAZEPAM 1 MG/1
2 TABLET ORAL
Status: DISCONTINUED | OUTPATIENT
Start: 2022-07-31 | End: 2022-08-07

## 2022-07-31 RX ADMIN — SODIUM CHLORIDE 100 ML/HR: 9 INJECTION, SOLUTION INTRAVENOUS at 22:06

## 2022-07-31 RX ADMIN — CEFTRIAXONE SODIUM 1 G: 1 INJECTION, POWDER, FOR SOLUTION INTRAMUSCULAR; INTRAVENOUS at 23:38

## 2022-07-31 RX ADMIN — SODIUM CHLORIDE 500 ML: 9 INJECTION, SOLUTION INTRAVENOUS at 19:48

## 2022-07-31 RX ADMIN — Medication 10 ML: at 22:00

## 2022-07-31 RX ADMIN — SODIUM CHLORIDE 125 ML/HR: 9 INJECTION, SOLUTION INTRAVENOUS at 23:39

## 2022-07-31 RX ADMIN — MAGNESIUM SULFATE HEPTAHYDRATE 2 G: 40 INJECTION, SOLUTION INTRAVENOUS at 19:50

## 2022-08-01 ENCOUNTER — TELEPHONE (OUTPATIENT)
Dept: FAMILY MEDICINE CLINIC | Facility: CLINIC | Age: 52
End: 2022-08-01

## 2022-08-01 VITALS
RESPIRATION RATE: 16 BRPM | SYSTOLIC BLOOD PRESSURE: 128 MMHG | HEART RATE: 88 BPM | OXYGEN SATURATION: 99 % | TEMPERATURE: 98.2 F | DIASTOLIC BLOOD PRESSURE: 68 MMHG

## 2022-08-01 PROBLEM — M1A.9XX1 TOPHACEOUS GOUT: Status: ACTIVE | Noted: 2022-08-01

## 2022-08-01 LAB
ALBUMIN SERPL-MCNC: 3.3 G/DL (ref 3.5–5.2)
ALP SERPL-CCNC: 77 U/L (ref 39–117)
ALT SERPL W P-5'-P-CCNC: 10 U/L (ref 1–41)
AMMONIA BLD-SCNC: 113 UMOL/L (ref 16–60)
ANION GAP SERPL CALCULATED.3IONS-SCNC: 15.3 MMOL/L (ref 5–15)
AST SERPL-CCNC: 28 U/L (ref 1–40)
BASOPHILS # BLD AUTO: 0.09 10*3/MM3 (ref 0–0.2)
BASOPHILS NFR BLD AUTO: 1.6 % (ref 0–1.5)
BILIRUB CONJ SERPL-MCNC: 0.3 MG/DL (ref 0–0.3)
BILIRUB INDIRECT SERPL-MCNC: 0.5 MG/DL
BILIRUB SERPL-MCNC: 0.8 MG/DL (ref 0–1.2)
BUN SERPL-MCNC: 30 MG/DL (ref 6–20)
BUN/CREAT SERPL: 12.2 (ref 7–25)
CALCIUM SPEC-SCNC: 9.2 MG/DL (ref 8.6–10.5)
CHLORIDE SERPL-SCNC: 111 MMOL/L (ref 98–107)
CO2 SERPL-SCNC: 14.7 MMOL/L (ref 22–29)
CREAT SERPL-MCNC: 2.45 MG/DL (ref 0.76–1.27)
DEPRECATED RDW RBC AUTO: 49.1 FL (ref 37–54)
EGFRCR SERPLBLD CKD-EPI 2021: 31.1 ML/MIN/1.73
EOSINOPHIL # BLD AUTO: 0.2 10*3/MM3 (ref 0–0.4)
EOSINOPHIL NFR BLD AUTO: 3.6 % (ref 0.3–6.2)
ERYTHROCYTE [DISTWIDTH] IN BLOOD BY AUTOMATED COUNT: 14.7 % (ref 12.3–15.4)
GLUCOSE SERPL-MCNC: 95 MG/DL (ref 65–99)
HCT VFR BLD AUTO: 32.6 % (ref 37.5–51)
HGB BLD-MCNC: 11.2 G/DL (ref 13–17.7)
IMM GRANULOCYTES # BLD AUTO: 0.03 10*3/MM3 (ref 0–0.05)
IMM GRANULOCYTES NFR BLD AUTO: 0.5 % (ref 0–0.5)
INR PPP: 1.28 (ref 0.9–1.1)
LYMPHOCYTES # BLD AUTO: 1.28 10*3/MM3 (ref 0.7–3.1)
LYMPHOCYTES NFR BLD AUTO: 22.9 % (ref 19.6–45.3)
MAGNESIUM SERPL-MCNC: 2.4 MG/DL (ref 1.6–2.6)
MCH RBC QN AUTO: 31.3 PG (ref 26.6–33)
MCHC RBC AUTO-ENTMCNC: 34.4 G/DL (ref 31.5–35.7)
MCV RBC AUTO: 91.1 FL (ref 79–97)
MONOCYTES # BLD AUTO: 0.54 10*3/MM3 (ref 0.1–0.9)
MONOCYTES NFR BLD AUTO: 9.7 % (ref 5–12)
NEUTROPHILS NFR BLD AUTO: 3.45 10*3/MM3 (ref 1.7–7)
NEUTROPHILS NFR BLD AUTO: 61.7 % (ref 42.7–76)
NRBC BLD AUTO-RTO: 0 /100 WBC (ref 0–0.2)
PLATELET # BLD AUTO: 108 10*3/MM3 (ref 140–450)
PMV BLD AUTO: 9.9 FL (ref 6–12)
POTASSIUM SERPL-SCNC: 4.1 MMOL/L (ref 3.5–5.2)
PROT SERPL-MCNC: 6.4 G/DL (ref 6–8.5)
PROTHROMBIN TIME: 15.8 SECONDS (ref 11.7–14.2)
QT INTERVAL: 457 MS
RBC # BLD AUTO: 3.58 10*6/MM3 (ref 4.14–5.8)
SARS-COV-2 ORF1AB RESP QL NAA+PROBE: NOT DETECTED
SODIUM SERPL-SCNC: 141 MMOL/L (ref 136–145)
URATE SERPL-MCNC: 9.1 MG/DL (ref 3.4–7)
WBC NRBC COR # BLD: 5.59 10*3/MM3 (ref 3.4–10.8)

## 2022-08-01 PROCEDURE — 85610 PROTHROMBIN TIME: CPT | Performed by: INTERNAL MEDICINE

## 2022-08-01 PROCEDURE — 85025 COMPLETE CBC W/AUTO DIFF WBC: CPT | Performed by: INTERNAL MEDICINE

## 2022-08-01 PROCEDURE — 82140 ASSAY OF AMMONIA: CPT | Performed by: INTERNAL MEDICINE

## 2022-08-01 PROCEDURE — 99222 1ST HOSP IP/OBS MODERATE 55: CPT | Performed by: INTERNAL MEDICINE

## 2022-08-01 PROCEDURE — 25010000002 ONDANSETRON PER 1 MG: Performed by: INTERNAL MEDICINE

## 2022-08-01 PROCEDURE — 63710000001 ONDANSETRON PER 8 MG: Performed by: INTERNAL MEDICINE

## 2022-08-01 PROCEDURE — 25010000002 DIAZEPAM PER 5 MG: Performed by: INTERNAL MEDICINE

## 2022-08-01 PROCEDURE — 84550 ASSAY OF BLOOD/URIC ACID: CPT | Performed by: INTERNAL MEDICINE

## 2022-08-01 PROCEDURE — 0 MAGNESIUM SULFATE 4 GM/100ML SOLUTION: Performed by: INTERNAL MEDICINE

## 2022-08-01 PROCEDURE — 80076 HEPATIC FUNCTION PANEL: CPT | Performed by: INTERNAL MEDICINE

## 2022-08-01 PROCEDURE — 83735 ASSAY OF MAGNESIUM: CPT | Performed by: INTERNAL MEDICINE

## 2022-08-01 PROCEDURE — 80048 BASIC METABOLIC PNL TOTAL CA: CPT | Performed by: INTERNAL MEDICINE

## 2022-08-01 PROCEDURE — 25010000002 THIAMINE PER 100 MG: Performed by: INTERNAL MEDICINE

## 2022-08-01 RX ORDER — SODIUM CHLORIDE 450 MG/100ML
100 INJECTION, SOLUTION INTRAVENOUS CONTINUOUS
Status: DISCONTINUED | OUTPATIENT
Start: 2022-08-01 | End: 2022-08-03

## 2022-08-01 RX ORDER — FOLIC ACID 1 MG/1
1 TABLET ORAL DAILY
Status: DISCONTINUED | OUTPATIENT
Start: 2022-08-02 | End: 2022-08-12

## 2022-08-01 RX ORDER — DIPHENOXYLATE HYDROCHLORIDE AND ATROPINE SULFATE 2.5; .025 MG/1; MG/1
1 TABLET ORAL DAILY
Status: DISCONTINUED | OUTPATIENT
Start: 2022-08-02 | End: 2022-08-14 | Stop reason: HOSPADM

## 2022-08-01 RX ORDER — OXYCODONE HYDROCHLORIDE 5 MG/1
5 TABLET ORAL EVERY 4 HOURS PRN
Status: DISCONTINUED | OUTPATIENT
Start: 2022-08-01 | End: 2022-08-04

## 2022-08-01 RX ORDER — SODIUM BICARBONATE 650 MG/1
1300 TABLET ORAL 2 TIMES DAILY
Status: DISCONTINUED | OUTPATIENT
Start: 2022-08-01 | End: 2022-08-02

## 2022-08-01 RX ADMIN — LACTULOSE 30 G: 10 SOLUTION ORAL at 08:55

## 2022-08-01 RX ADMIN — ONDANSETRON HYDROCHLORIDE 4 MG: 4 TABLET, FILM COATED ORAL at 21:47

## 2022-08-01 RX ADMIN — THIAMINE HYDROCHLORIDE 500 MG: 100 INJECTION, SOLUTION INTRAMUSCULAR; INTRAVENOUS at 23:39

## 2022-08-01 RX ADMIN — Medication 1 TABLET: at 08:55

## 2022-08-01 RX ADMIN — MAGNESIUM SULFATE HEPTAHYDRATE 4 G: 40 INJECTION, SOLUTION INTRAVENOUS at 01:23

## 2022-08-01 RX ADMIN — ONDANSETRON 4 MG: 2 INJECTION INTRAMUSCULAR; INTRAVENOUS at 03:45

## 2022-08-01 RX ADMIN — Medication 1 MG: at 08:55

## 2022-08-01 RX ADMIN — Medication 1 APPLICATION: at 16:19

## 2022-08-01 RX ADMIN — SODIUM BICARBONATE 1300 MG: 650 TABLET ORAL at 20:00

## 2022-08-01 RX ADMIN — OXYCODONE 5 MG: 5 TABLET ORAL at 20:00

## 2022-08-01 RX ADMIN — RIFAXIMIN 550 MG: 550 TABLET ORAL at 20:00

## 2022-08-01 RX ADMIN — CARVEDILOL 12.5 MG: 12.5 TABLET, FILM COATED ORAL at 20:00

## 2022-08-01 RX ADMIN — CITALOPRAM 20 MG: 20 TABLET, FILM COATED ORAL at 08:55

## 2022-08-01 RX ADMIN — LORAZEPAM 1 MG: 1 TABLET ORAL at 20:00

## 2022-08-01 RX ADMIN — LORAZEPAM 1 MG: 1 TABLET ORAL at 23:39

## 2022-08-01 RX ADMIN — OXYCODONE 5 MG: 5 TABLET ORAL at 23:39

## 2022-08-01 RX ADMIN — ALLOPURINOL 100 MG: 100 TABLET ORAL at 08:55

## 2022-08-01 RX ADMIN — LACTULOSE 30 G: 10 SOLUTION ORAL at 01:22

## 2022-08-01 RX ADMIN — CARVEDILOL 12.5 MG: 12.5 TABLET, FILM COATED ORAL at 08:55

## 2022-08-01 RX ADMIN — LACTULOSE 30 G: 10 SOLUTION ORAL at 16:19

## 2022-08-01 RX ADMIN — Medication 220 MG: at 08:55

## 2022-08-01 RX ADMIN — LACTULOSE 30 G: 10 SOLUTION ORAL at 20:00

## 2022-08-01 RX ADMIN — THIAMINE HYDROCHLORIDE 500 MG: 100 INJECTION, SOLUTION INTRAMUSCULAR; INTRAVENOUS at 01:23

## 2022-08-01 RX ADMIN — DIAZEPAM 5 MG: 5 INJECTION, SOLUTION INTRAMUSCULAR; INTRAVENOUS at 04:15

## 2022-08-01 RX ADMIN — PANTOPRAZOLE SODIUM 40 MG: 40 TABLET, DELAYED RELEASE ORAL at 08:55

## 2022-08-01 RX ADMIN — RIFAXIMIN 550 MG: 550 TABLET ORAL at 08:55

## 2022-08-01 RX ADMIN — SODIUM CHLORIDE 100 ML/HR: 4.5 INJECTION, SOLUTION INTRAVENOUS at 18:11

## 2022-08-01 RX ADMIN — RIFAXIMIN 550 MG: 550 TABLET ORAL at 01:22

## 2022-08-01 RX ADMIN — Medication 10 ML: at 08:55

## 2022-08-01 RX ADMIN — OXYCODONE 5 MG: 5 TABLET ORAL at 16:19

## 2022-08-01 NOTE — TELEPHONE ENCOUNTER
Home health called and wanted verbal order to resume care when pt is discharged from hospital [phone]

## 2022-08-01 NOTE — H&P
Patient Name:  Macario Carpio  YOB: 1970  MRN:  [id number]  Admit Date:  7/31/2022  Patient Care Team:  Andrew Grayson APRN as PCP - General (Family Medicine)      Subjective   History Present Illness     Chief Complaint   Patient presents with   • Altered Mental Status       Mr. Carpio is a 51 y.o. with a history of alcohol abuse and cirrhosis, HCV, who presents with altered mental status. History is obtained though the chart and discussion with ER physician.  He had stopped drinking about 5 days ago.  Last night since 9:00 he has been confused.  He came to [facility] emergency room.  He did have elevated ammonia level.  Also with low magnesium was given magnesium replacement.  He follows simple commands but is repetitive in his speech and oriented only to self.    History of Present Illness  Review of Systems   Unable to perform ROS: Mental status change        Personal History     Past Medical History:   Diagnosis Date   • Gout    • Hepatitis C      Patient Active Problem List   Diagnosis   • Iron deficiency anemia   • Hepatic cirrhosis (HCC)   • Screening for malignant neoplasm of colon   • Abdominal aortic aneurysm (AAA) (HCC)   • Hepatitis C virus infection   • Gout   • Hypercholesterolemia   • Hepatic cirrhosis (HCC)   • Thoracic aortic aneurysm without rupture (HCC)   • Hypertensive disorder   • Hip pain   • Chronic pain   • Bicuspid aortic valve   • Arthritis of right hip   • Aortic valve regurgitation   • Anxiety   • Abdominal aortic aneurysm (AAA) (HCC)   • Hepatic cirrhosis (HCC)   • Hepatitis C virus infection   • Chronic pain   • Hepatic encephalopathy (HCC)   • Alcoholic cirrhosis of liver without ascites (HCC)   • YANNI (acute kidney injury) (HCC)   • Hypomagnesemia       Past Surgical History:   Procedure Laterality Date   • TOE AMPUTATION     • TOTAL HIP ARTHROPLASTY Right 12/2020     Family History   Problem Relation Age of Onset   • Heart disease Mother    • Heart  disease Father    • Diabetes Maternal Grandfather      Social History     Tobacco Use   • Smoking status: Never Smoker   • Smokeless tobacco: Never Used   Substance Use Topics   • Alcohol use: No   • Drug use: Never     Medications Prior to Admission   Medication Sig Dispense Refill Last Dose   • amLODIPine (NORVASC) 5 MG tablet Take 1 tablet by mouth Daily. 30 tablet 2 7/30/2022 at Unknown time   • celecoxib (CeleBREX) 200 MG capsule Take 400 mg by mouth Daily.   7/30/2022 at Unknown time   • citalopram (CeleXA) 20 MG tablet Take 1 tablet by mouth Daily. 90 tablet 2 7/30/2022 at Unknown time   • colchicine 0.6 MG tablet Take 1 tablet by mouth 2 (Two) Times a Day. 180 tablet 1 7/30/2022 at Unknown time   • furosemide (LASIX) 40 MG tablet Take 1 tablet by mouth Daily. 90 tablet 1 7/30/2022 at Unknown time   • losartan (COZAAR) 50 MG tablet Take 1 tablet by mouth Daily. 90 tablet 2 7/30/2022 at Unknown time   • meloxicam (MOBIC) 15 MG tablet Take 1 tablet by mouth Daily. 90 tablet 2 7/30/2022 at Unknown time   • mupirocin (BACTROBAN) 2 % ointment    Past Week at Unknown time   • oxyCODONE (ROXICODONE) 10 MG tablet 10 mg.   7/30/2022 at Unknown time   • pantoprazole (PROTONIX) 40 MG EC tablet TAKE ONE TABLET BY MOUTH DAILY 90 tablet 0 7/30/2022 at Unknown time   • allopurinol (ZYLOPRIM) 100 MG tablet Take 1 tablet by mouth twice daily 180 tablet 1 Unknown at Unknown time   • amantadine (SYMMETREL) 100 MG tablet    Unknown at Unknown time   • atorvastatin (LIPITOR) 80 MG tablet Take 1 tablet by mouth Daily. 90 tablet 2 Unknown at Unknown time   • carvedilol (COREG) 25 MG tablet TAKE ONE TABLET BY MOUTH TWICE DAILY 180 tablet 0 Unknown at Unknown time   • folic acid (FOLVITE) 1 MG tablet Take 1 mg by mouth Daily.   Unknown at Unknown time   • lactulose (CHRONULAC) 10 GM/15ML solution Take 30 mL by mouth 2 (two) times a day. 1800 mL 0 Unknown at Unknown time   • Menthol, Topical Analgesic, (Biofreeze Roll-On) 4 % gel  Apply 1 application topically 4 (Four) Times a Day As Needed (pain). 118 mL 0 Unknown at Unknown time   • methylPREDNISolone (MEDROL) 4 MG dose pack Take as directed on package instructions. 21 tablet 0 Unknown at Unknown time   • Misc. Devices (TRANSFER BENCH) misc 1 each Daily. To use while showering. 1 each 0 Unknown at Unknown time   • spironolactone (ALDACTONE) 25 MG tablet   mg Tab, Oral, BID, 0 Refill(s)   Unknown at Unknown time   • thiamine (VITAMIN B-1) 100 MG tablet Take 1 tablet by mouth Daily. 30 tablet 2 Unknown at Unknown time   • Xifaxan 550 MG tablet    Unknown at Unknown time   • zinc gluconate 50 MG tablet    Unknown at Unknown time     Allergies:    Allergies   Allergen Reactions   • Indomethacin Other (See Comments)     Lips sting and lightheadedness       Objective    Objective     Vital Signs  Temp:  [97.4 °F (36.3 °C)-98.7 °F (37.1 °C)] 98.7 °F (37.1 °C)  Heart Rate:  [54-70] 54  Resp:  [17-18] 18  BP: (113-137)/(64-94) 113/64  SpO2:  [98 %] 98 %  on   ;   Device (Oxygen Therapy): room air  Body mass index is 27.7 kg/m².    Physical Exam  Vitals and nursing note reviewed.   Constitutional:       General: He is in acute distress.      Appearance: He is well-developed. He is ill-appearing. He is not diaphoretic.   HENT:      Head: Normocephalic and atraumatic.   Eyes:      General: No scleral icterus.     Conjunctiva/sclera: Conjunctivae normal.   Neck:      Vascular: No JVD.   Cardiovascular:      Rate and Rhythm: Normal rate and regular rhythm.      Heart sounds: Normal heart sounds. No murmur heard.  Pulmonary:      Effort: Pulmonary effort is normal. No respiratory distress.      Breath sounds: Normal breath sounds.   Abdominal:      General: Bowel sounds are normal.      Palpations: Abdomen is soft.      Tenderness: There is no abdominal tenderness.   Musculoskeletal:         General: Normal range of motion.      Cervical back: Normal range of motion and neck supple.   Skin:     General:  Skin is warm and dry.   Neurological:      Motor: Weakness (generalized) present. No abnormal muscle tone.     Awake but drowsy and confused.  Repeats his name and birthdate.  Does not know where he is or the date.  He follows simple commands but is drowsy.  Slight asterixis    Results Review:  I reviewed the patient's new clinical results.  Discussed with ED provider.    Lab Results (last 24 hours)     Procedure Component Value Units Date/Time    POC Glucose Once [[id number]]  (Normal) Collected: 07/31/22 1605    Specimen: Blood Updated: 07/31/22 1606     Glucose 93 mg/dL      Comment: Meter: [id number] : [id number] Amie Griffin EDT       Ammonia [[id number]]  (Abnormal) Collected: 07/31/22 1632    Specimen: Blood Updated: 07/31/22 1658     Ammonia 68 umol/L     CBC & Differential [[id number]]  (Abnormal) Collected: 07/31/22 1632    Specimen: Blood Updated: 07/31/22 1644    Narrative:      The following orders were created for panel order CBC & Differential.  Procedure                               Abnormality         Status                     ---------                               -----------         ------                     CBC Auto Differential[[id number]]        Abnormal            Final result                 Please view results for these tests on the individual orders.    Protime-INR [[id number]]  (Abnormal) Collected: 07/31/22 1632    Specimen: Blood Updated: 07/31/22 1708     Protime 15.7 Seconds      INR 1.27    Magnesium [[id number]]  (Abnormal) Collected: 07/31/22 1632    Specimen: Blood Updated: 07/31/22 1705     Magnesium 1.2 mg/dL     Ethanol [[id number]] Collected: 07/31/22 1632    Specimen: Blood Updated: 07/31/22 1705     Ethanol <10 mg/dL      Ethanol % <0.010 %     CBC Auto Differential [[id number]]  (Abnormal) Collected: 07/31/22 1632    Specimen: Blood Updated: 07/31/22 1644     WBC 6.14 10*3/mm3      RBC 4.24 10*6/mm3      Hemoglobin 13.3 g/dL      Hematocrit 38.7 %      MCV 91.3 fL      MCH  31.4 pg      MCHC 34.4 g/dL      RDW 15.0 %      RDW-SD 49.6 fl      MPV 10.3 fL      Platelets 161 10*3/mm3      Neutrophil % 64.0 %      Lymphocyte % 22.1 %      Monocyte % 9.3 %      Eosinophil % 2.3 %      Basophil % 1.3 %      Immature Grans % 1.0 %      Neutrophils, Absolute 3.93 10*3/mm3      Lymphocytes, Absolute 1.36 10*3/mm3      Monocytes, Absolute 0.57 10*3/mm3      Eosinophils, Absolute 0.14 10*3/mm3      Basophils, Absolute 0.08 10*3/mm3      Immature Grans, Absolute 0.06 10*3/mm3      nRBC 0.0 /100 WBC     Blood Gas, Arterial - [[id number]]  (Abnormal) Collected: 07/31/22 1700    Specimen: Arterial Blood Updated: 07/31/22 1703     Site Arterial: right radial     Gadiel's Test Positive     pH, Arterial 7.445 pH units      pCO2, Arterial 22.4 mm Hg      pO2, Arterial 87.7 mm Hg      HCO3, Arterial 15.4 mmol/L      Base Excess, Arterial -6.6 mmol/L      O2 Saturation Calculated 97.4 %      A-a DO2 0.7 mmHg      Barometric Pressure for Blood Gas 753.2 mmHg      Comment: 98% Meter: [id number] : [id number] Nnamdi Bowerscy        Modality Room Air     FIO2 21 %      Rate 14 Breaths/minute     Comprehensive Metabolic Panel [[id number]]  (Abnormal) Collected: 07/31/22 1736    Specimen: Blood from Arm, Left Updated: 07/31/22 1835     Glucose 85 mg/dL      BUN 29 mg/dL      Creatinine 2.79 mg/dL      Sodium 142 mmol/L      Potassium 4.6 mmol/L      Chloride 108 mmol/L      CO2 15.2 mmol/L      Calcium 9.5 mg/dL      Total Protein 7.5 g/dL      Albumin 3.90 g/dL      ALT (SGPT) 9 U/L      AST (SGOT) 36 U/L      Alkaline Phosphatase 87 U/L      Total Bilirubin 1.5 mg/dL      Globulin 3.6 gm/dL      A/G Ratio 1.1 g/dL      BUN/Creatinine Ratio 10.4     Anion Gap 18.8 mmol/L      eGFR 26.6 mL/min/1.73      Comment: National Kidney Foundation and American Society of Nephrology (ASN) Task Force recommended calculation based on the Chronic Kidney Disease Epidemiology Collaboration (CKD-EPI) equation refit without  adjustment for race.       Narrative:      GFR Normal >60  Chronic Kidney Disease <60  Kidney Failure <15      Troponin [[id number]]  (Normal) Collected: 07/31/22 1736    Specimen: Blood from Arm, Left Updated: 07/31/22 1835     Troponin T <0.010 ng/mL     Narrative:      Troponin T Reference Range:  <= 0.03 ng/mL-   Negative for AMI  >0.03 ng/mL-     Abnormal for myocardial necrosis.  Clinicians would have to utilize clinical acumen, EKG, Troponin and serial changes to determine if it is an Acute Myocardial Infarction or myocardial injury due to an underlying chronic condition.       Results may be falsely decreased if patient taking Biotin.      Urinalysis With Microscopic If Indicated (No Culture) - Urine, Clean Catch [[id number]]  (Abnormal) Collected: 07/31/22 2052    Specimen: Urine, Clean Catch Updated: 07/31/22 2106     Color, UA Yellow     Appearance, UA Cloudy     pH, UA 5.5     Specific Gravity, UA 1.015     Glucose, UA Negative     Ketones, UA Trace     Bilirubin, UA Negative     Blood, UA Large (3+)     Protein, UA 30 mg/dL (1+)     Leuk Esterase, UA Large (3+)     Nitrite, UA Negative     Urobilinogen, UA 1.0 E.U./dL    Urine Drug Screen - Urine, Clean Catch [[id number]]  (Abnormal) Collected: 07/31/22 2052    Specimen: Urine, Clean Catch Updated: 07/31/22 2126     Amphet/Methamphet, Screen Negative     Barbiturates Screen, Urine Negative     Benzodiazepine Screen, Urine Negative     Cocaine Screen, Urine Negative     Opiate Screen Negative     THC, Screen, Urine Positive     Methadone Screen, Urine Negative     Oxycodone Screen, Urine Positive    Narrative:      Negative Thresholds Per Drugs Screened:    Amphetamines                 500 ng/ml  Barbiturates                 200 ng/ml  Benzodiazepines              100 ng/ml  Cocaine                      300 ng/ml  Methadone                    300 ng/ml  Opiates                      300 ng/ml  Oxycodone                    100 ng/ml  THC                            50 ng/ml    The Normal Value for all drugs tested is negative. This report includes final unconfirmed screening results to be used for medical treatment purposes only. Unconfirmed results must not be used for non-medical purposes such as employment or legal testing. Clinical consideration should be applied to any drug of abuse test, particularly when unconfirmed results are used.            Urinalysis, Microscopic Only - Urine, Clean Catch [[id number]]  (Abnormal) Collected: 07/31/22 2052    Specimen: Urine, Clean Catch Updated: 07/31/22 2115     RBC, UA 13-20 /HPF      WBC, UA 21-30 /HPF      Bacteria, UA 1+ /HPF      Squamous Epithelial Cells, UA 3-6 /HPF      Hyaline Casts, UA 0-2 /LPF      Methodology Manual Light Microscopy    COVID PRE-OP / PRE-PROCEDURE SCREENING ORDER (NO ISOLATION) - Swab, Nasopharynx [[id number]] Collected: 07/31/22 2146    Specimen: Swab from Nasopharynx Updated: 07/31/22 2156    Narrative:      The following orders were created for panel order COVID PRE-OP / PRE-PROCEDURE SCREENING ORDER (NO ISOLATION) - Swab, Nasopharynx.  Procedure                               Abnormality         Status                     ---------                               -----------         ------                     COVID-19,APTIMA PANTHER(...[[id number]]                      In process                   Please view results for these tests on the individual orders.    COVID-19,APTIMA PANTHER(LEXY),[facility], NP/OP SWAB IN UTM/VTM/SALINE TRANSPORT MEDIA,24 HR TAT - Swab, Nasopharynx [[id number]] Collected: 07/31/22 2146    Specimen: Swab from Nasopharynx Updated: 07/31/22 2156          Imaging Results (Last 24 Hours)     Procedure Component Value Units Date/Time    XR Chest 1 View [[id number]] Collected: 07/31/22 1707     Updated: 07/31/22 2128    Narrative:      PORTABLE CHEST     HISTORY: Altered mental status.     COMPARISON: None.     FINDINGS:  A single portable view of the chest was obtained. The  patient  is rotated to the left. This study is hampered somewhat by the patient's  body habitus and rotation but there is no evidence of focal infiltrate,  consolidation, effusion or of congestive failure.     This report was finalized on 7/31/2022 9:25 PM by Dr. Juan Boswell M.D.       CT Abdomen Pelvis Without Contrast [[id number]] Collected: 07/31/22 1924     Updated: 07/31/22 1924    Narrative:        Patient: IGOR BOYER  Time Out: 19:23  Exam(s): CT ABDOMEN + PELVIS Without Contrast     EXAM:    CT Abdomen and Pelvis Without Intravenous Contrast    CLINICAL HISTORY:     Reason for exam: Patient with altered mental status. At times he seems   to have abdominal pain with palpation and other times he does not. His   belly is nice and soft..    TECHNIQUE:    Axial computed tomography images of the abdomen and pelvis without   intravenous contrast.  CTDI is 25.8 mGy and DLP is 1415.5 mGy-cm.  This   CT exam was performed according to the principle of ALARA (As Low As   Reasonably Achievable) by using one or more of the following dose   reduction techniques: automated exposure control, adjustment of the mA   and or kV according to patient size, and or use of iterative   reconstruction technique.    COMPARISON:    No relevant prior studies available.    FINDINGS:    Lung bases:  Calcified pulmonary nodule within the left lower lobe.    Heart:  Coronary artery calcifications.     ABDOMEN:    Liver:  Cirrhotic liver.    Gallbladder and bile ducts:  Distended gallbladder with suspected   gallstones versus tumefactive sludge.    Pancreas:  Unremarkable.    Spleen:  Calcified granulomata within the spleen.  Mild splenomegaly.    Adrenals:  Unremarkable.    Kidneys and ureters:  Nonobstructing calculi within the left kidney.    No hydronephrosis.    Stomach and bowel:  Unremarkable.     PELVIS:    Appendix:  Appendix is within normal limits.    Bladder:  Unremarkable.    Reproductive:  Unremarkable as  visualized.     ABDOMEN and PELVIS:    Intraperitoneal space:  Unremarkable.    Bones joints:  Right hip arthroplasty.  Degenerative changes of the   spine.  No acute fracture.  No dislocation.    Soft tissues:  Bilateral gynecomastia.    Vasculature:  Portosystemic collaterals to include paraesophageal   varices.  Vascular calcifications.    Lymph nodes:  Unremarkable.    IMPRESSION:       1.  Cirrhotic liver.  2.  Distended gallbladder with suspected gallstones versus tumefactive   sludge.  No evidence of acute cholecystitis.  3.  Nonobstructing calculi within the left kidney.  No hydronephrosis.  4.  Splenomegaly and portosystemic collaterals to include paraesophageal   varices.      Impression:          Electronically signed by Donal Vigil MD on 07-31-22 at 1923    CT Head Without Contrast [[id number]] Collected: 07/31/22 1908     Updated: 07/31/22 1908    Narrative:      CT HEAD WITHOUT CONTRAST     HISTORY: Altered mental status.     COMPARISON: None.     FINDINGS: The brain ventricles are symmetrical. There is no evidence of  hemorrhage, hydrocephalus or of abnormal extra-axial fluid. No focal  area of decreased attenuation to suggest acute infarction is identified.       Impression:      No acute process identified. Further evaluation could be  performed with an MRI examination of the brain as indicated.     Radiation dose reduction techniques were utilized, including automated  exposure control and exposure modulation based on body size.                       ECG 12 Lead   Preliminary Result   HEART RATE= 65  bpm   RR Interval= 928  ms   SD Interval= 187  ms   P Horizontal Axis= 7  deg   P Front Axis= 11  deg   QRSD Interval= 104  ms   QT Interval= 457  ms   QRS Axis= -47  deg   T Wave Axis= -33  deg   - ABNORMAL ECG -   Sinus rhythm   Probable left atrial enlargement   Left anterior fascicular block   Probable anterior infarct, age indeterminate   Electronically Signed By:    Date and Time of Study:  2022-07-31 16:50:06           Assessment/Plan     Active Hospital Problems    Diagnosis  POA   • **Hepatic encephalopathy (HCC) [K72.90]  Yes   • YANNI (acute kidney injury) (HCC) [N17.9]  Unknown   • Hypomagnesemia [E83.42]  Unknown   • Alcoholic cirrhosis of liver without ascites (HCC) [K70.30]  Yes   • Hypertensive disorder [I10]  Yes         · Encephalopathy likely secondary to hepatic encephalopathy and will treat with rifaximin as well as lactulose.  Less likely could be Wernicke's encephalopathy so we will also give high-dose IV thiamine.  Could also be related to his renal failure or substance abuse with THC and oxycodone positive on his drug testing  · Monitor for alcohol w/d and treat with CIWA protocol.   · IV fluids and monitor renal function closely. Hold his diurtics and nsaids  · Replace magnesium and monitor electrolytes  · Abnormal U/A. I doubt UTI but getting one dose of IV ceftriaxone and then re-assess symptoms tomorrow. I think more abnormal from the renal failure.   · Monitor blood pressure- hold his BP but hold diuretics/BP medications at this time.   · I discussed the patients findings and my recommendations with patient and consulting provider.    VTE Prophylaxis - SCDs.  Code Status - Full code.       Foreign Wilkins MD  [facility]  07/31/22  22:38 EDT

## 2022-08-01 NOTE — PROGRESS NOTES
[facility] is current with home care SN.  We will continue to follow for HH needs at D/C.

## 2022-08-01 NOTE — CONSULTS
[facility]  Initial Inpatient Consult Note    Referring Provider: CALLUM Marcos    Reason for Consultation: Hepatic encephalopathy, alcoholic/hepatitis C cirrhosis    Subjective     History of present illness:    51 y.o. male seen previously by Dr. Kraig Almeida in August 2020 for iron deficiency anemia.  Noted at that time to have a history of cirrhosis from hepatitis C that had been treated while in [place].  He was on lactulose for encephalopathy at that time.  He was scheduled to have endoscopic evaluation but there is no record of that being completed.  He has a somewhat vague historian states that his last drink was 6 days ago.  He denies any withdrawal issues.  He does recall use of lactulose in the past.  Not clear as to whether he had been on Xifaxan.  He complains of gout issues and states he is followed in the pain clinic for treatment.  Inquired as to whether he had been seen by rheumatology in the past, he denied this.    Past Medical History:  Past Medical History:   Diagnosis Date   • Gout    • Hepatitis C      Past Surgical History:  Past Surgical History:   Procedure Laterality Date   • TOE AMPUTATION     • TOTAL HIP ARTHROPLASTY Right 12/2020      Social History:   Social History     Tobacco Use   • Smoking status: Never Smoker   • Smokeless tobacco: Never Used   Substance Use Topics   • Alcohol use: No      Family History:  Family History   Problem Relation Age of Onset   • Heart disease Mother    • Heart disease Father    • Diabetes Maternal Grandfather        Home Meds:  Medications Prior to Admission   Medication Sig Dispense Refill Last Dose   • amLODIPine (NORVASC) 5 MG tablet Take 1 tablet by mouth Daily. 30 tablet 2 7/30/2022 at Unknown time   • celecoxib (CeleBREX) 200 MG capsule Take 400 mg by mouth Daily.   7/30/2022 at Unknown time   • citalopram (CeleXA) 20 MG tablet Take 1 tablet by mouth Daily. 90 tablet 2 7/30/2022 at Unknown time   • colchicine 0.6  MG tablet Take 1 tablet by mouth 2 (Two) Times a Day. 180 tablet 1 7/30/2022 at Unknown time   • furosemide (LASIX) 40 MG tablet Take 1 tablet by mouth Daily. 90 tablet 1 7/30/2022 at Unknown time   • losartan (COZAAR) 50 MG tablet Take 1 tablet by mouth Daily. 90 tablet 2 7/30/2022 at Unknown time   • meloxicam (MOBIC) 15 MG tablet Take 1 tablet by mouth Daily. 90 tablet 2 7/30/2022 at Unknown time   • mupirocin (BACTROBAN) 2 % ointment    Past Week at Unknown time   • oxyCODONE (ROXICODONE) 10 MG tablet 10 mg.   7/30/2022 at Unknown time   • pantoprazole (PROTONIX) 40 MG EC tablet TAKE ONE TABLET BY MOUTH DAILY 90 tablet 0 7/30/2022 at Unknown time   • allopurinol (ZYLOPRIM) 100 MG tablet Take 1 tablet by mouth twice daily 180 tablet 1 Unknown at Unknown time   • amantadine (SYMMETREL) 100 MG tablet    Unknown at Unknown time   • atorvastatin (LIPITOR) 80 MG tablet Take 1 tablet by mouth Daily. 90 tablet 2 Unknown at Unknown time   • carvedilol (COREG) 25 MG tablet TAKE ONE TABLET BY MOUTH TWICE DAILY 180 tablet 0 Unknown at Unknown time   • folic acid (FOLVITE) 1 MG tablet Take 1 mg by mouth Daily.   Unknown at Unknown time   • lactulose (CHRONULAC) 10 GM/15ML solution Take 30 mL by mouth 2 (two) times a day. 1800 mL 0 Unknown at Unknown time   • Menthol, Topical Analgesic, (Biofreeze Roll-On) 4 % gel Apply 1 application topically 4 (Four) Times a Day As Needed (pain). 118 mL 0 Unknown at Unknown time   • methylPREDNISolone (MEDROL) 4 MG dose pack Take as directed on package instructions. 21 tablet 0 Unknown at Unknown time   • Misc. Devices (TRANSFER BENCH) misc 1 each Daily. To use while showering. 1 each 0 Unknown at Unknown time   • spironolactone (ALDACTONE) 25 MG tablet   mg Tab, Oral, BID, 0 Refill(s)   Unknown at Unknown time   • thiamine (VITAMIN B-1) 100 MG tablet Take 1 tablet by mouth Daily. 30 tablet 2 Unknown at Unknown time   • Xifaxan 550 MG tablet    Unknown at Unknown time   • zinc gluconate 50  MG tablet    Unknown at Unknown time     Current Meds:   allopurinol, 100 mg, Oral, Daily  cadexomer iodine, 1 application, Topical, Once per day on Mon Thu  carvedilol, 12.5 mg, Oral, BID With Meals  citalopram, 20 mg, Oral, Daily  [START ON 8/2/2022] multivitamin, 1 tablet, Oral, Daily   And  [START ON 8/2/2022] folic acid, 1 mg, Oral, Daily  lactulose, 30 g, Oral, TID  pantoprazole, 40 mg, Oral, QAM  rifAXIMin, 550 mg, Oral, Q12H  sodium chloride, 10 mL, Intravenous, Q12H  thiamine (VITAMIN B1) IVPB, 500 mg, Intravenous, Daily  [START ON 8/2/2022] thiamine, 100 mg, Oral, Daily  zinc sulfate, 220 mg, Oral, Daily      Allergies:  Allergies   Allergen Reactions   • Indomethacin Other (See Comments)     Lips sting and lightheadedness     Review of Systems  Review of systems could not be obtained due to   patient confusion.     Objective     Vital Signs  Temp:  [97.4 °F (36.3 °C)-98.7 °F (37.1 °C)] 98.4 °F (36.9 °C)  Heart Rate:  [54-83] 83  Resp:  [17-20] 20  BP: (113-155)/(64-97) 129/87  Physical Exam:  General Appearance:    Alert, cooperative, in no acute distress   Head:    Normocephalic, without obvious abnormality, atraumatic   Eyes:          conjunctivae and sclerae normal, no   icterus   Throat:   no thrush, oral mucosa moist   Neck:   Supple, no adenopathy   Lungs:     Clear to auscultation bilaterally    Heart:    Regular rhythm and normal rate    Chest Wall:    No abnormalities observed   Abdomen:     Soft, nondistended, nontender; normal bowel sounds   Extremities:   no edema, no redness   Skin:   No bruising or rash   Psychiatric:  normal mood and insight     Results Review:   I reviewed the patient's new clinical results.    Results from last 7 days   Lab Units 08/01/22  1000 07/31/22  1632   WBC 10*3/mm3 5.59 6.14   HEMOGLOBIN g/dL 11.2* 13.3   HEMATOCRIT % 32.6* 38.7   PLATELETS 10*3/mm3 108* 161     Results from last 7 days   Lab Units 08/01/22  1000 07/31/22  1736   SODIUM mmol/L 141 142   POTASSIUM  mmol/L 4.1 4.6   CHLORIDE mmol/L 111* 108*   CO2 mmol/L 14.7* 15.2*   BUN mg/dL 30* 29*   CREATININE mg/dL 2.45* 2.79*   CALCIUM mg/dL 9.2 9.5   BILIRUBIN mg/dL 0.8 1.5*   ALK PHOS U/L 77 87   ALT (SGPT) U/L 10 9   AST (SGOT) U/L 28 36   GLUCOSE mg/dL 95 85     Results from last 7 days   Lab Units 08/01/22  1000 07/31/22  1632   INR  1.28* 1.27*     No results found for: LIPASE    Radiology:  CT Head Without Contrast   Final Result   No acute process identified. Further evaluation could be   performed with an MRI examination of the brain as indicated.       Radiation dose reduction techniques were utilized, including automated   exposure control and exposure modulation based on body size.       This report was finalized on 8/1/2022 7:10 AM by Dr. Juan Boswell M.D.          CT Abdomen Pelvis Without Contrast   Final Result         Electronically signed by Donal Vigil MD on 07-31-22 at 1923      XR Chest 1 View   Final Result          Assessment & Plan   Patient Active Problem List   Diagnosis   • Iron deficiency anemia   • Hepatic cirrhosis (HCC)   • Screening for malignant neoplasm of colon   • Abdominal aortic aneurysm (AAA) (HCC)   • Hepatitis C virus infection   • Gout   • Hypercholesterolemia   • Hepatic cirrhosis (HCC)   • Thoracic aortic aneurysm without rupture (HCC)   • Hypertensive disorder   • Hip pain   • Chronic pain   • Bicuspid aortic valve   • Arthritis of right hip   • Aortic valve regurgitation   • Anxiety   • Abdominal aortic aneurysm (AAA) (HCC)   • Hepatic cirrhosis (HCC)   • Hepatitis C virus infection   • Chronic pain   • Hepatic encephalopathy (HCC)   • Alcoholic cirrhosis of liver without ascites (HCC)   • YANNI (acute kidney injury) (HCC)   • Hypomagnesemia   • Tophaceous gout       Assessment:  1. Cirrhosis with prior history of hepatitis C and alcohol abuse  2. Hepatic encephalopathy: On lactulose in the past  3. Anemia, thrombocytopenia  4. Distended gallbladder with possible  stones/sludge on CT scan  5. Splenomegaly and portosystemic collaterals to include paraesophageal varices    Plan:  · Treat for his encephalopathy  · Monitor for possible alcohol withdrawal  · Anticipate eventual EGD to assess for varices as well as evaluation of his anemia      I discussed the patients findings and my recommendations with patient.    Manuel Ponce MD

## 2022-08-01 NOTE — PLAN OF CARE
"Goal Outcome Evaluation:  Plan of Care Reviewed With: patient        Progress: no change  Outcome Evaluation: Patient presents to our unit from ER with AMS. Alert to self and to place. States the year is 1997. C/O chronic pain in feet and shoulders. Patient unable to answer admission questions, spoke with wife for complete medical hx. Wife states was diagnosed with cirrhosis of liver 6 yrs ago and patient drinks 6-7 64 oz alocholic beverages every night. She stated patient \"quit cold turkey 5 days ago and I don't know why\". Patient is becoming more alert as the shift progresses with some remaining confusion at times. VSS all needs met.  "

## 2022-08-01 NOTE — NURSING NOTE
08/01/22 1520   Wound 08/01/22 0700 Right medial foot   Placement Date/Time: 08/01/22 0700   Present on Hospital Admission: Yes  Side: Right  Orientation: medial  Location: foot   Dressing Appearance open to air   Base red;dry   Periwound intact;dry;pink   Wound Length (cm) 1.5 cm   Wound Width (cm) 1 cm   Wound Depth (cm) 0 cm   Wound Surface Area (cm^2) 1.5 cm^2   Wound Volume (cm^3) 0 cm^3   Drainage Amount none   Care, Wound cleansed with;sterile normal saline  (iodosorb, gauze, kerlix, ace)     Wound/ostomy - consult received for the wound to R foot present on admission. Per review of chart patient had R great toe amputated at [facility] on 4/15/22 by Dr. Almazan for osteomyelitis and chronic gouti tophi, he left [facility] on 4/19 AMA. He presented to [facility] on 4/26 with pain and drainage and then followed up with PCP on 5/4 and at that time HH was ordered. HH saw patient for the first time on 5/5 and wound care regimen was established. Wound has decreased in size significantly per the images in media. Current home wound care is iodosorb, kerlix and gauze 2 times weekly performed by the HH nurse every 3-4 days. While patient is here we will continue same regimen. If patient remains in the hospital wound nurse will see him on Thursday to change the dressing.

## 2022-08-01 NOTE — CONSULTS
[facility] Consult Note      Patient Name: Macario Carpio  : 1970  MRN: [id number]  Primary Care Physician:  Andrew Grayson APRN  Referring Physician: No ref. provider found  Date of admission: 2022    Subjective     Reason for Consult:  YANNI    HPI:   Macario Carpio is a 51 y.o. male with normal renal function at baseline against a background of alcoholism, cirrhosis, hepatitis C (reportedly treated in [place]), and tophaceous gout, admitted yesterday for further evaluation of altered mental status.  SCR on arrival 2.8, with value 2.5 today.  Ammonia level 68 on arrival and 113 today.  Urine drug screen positive for oxycodone and marijuana.    Hospitalization: IVF, lactulose,    Review of Systems:   Not reliably reliably obtainable from the patient.  He cannot tell me the year or month; he is not sure why he is here.    Personal History     Past Medical History:   Diagnosis Date   • Gout    • Hepatitis C        Past Surgical History:   Procedure Laterality Date   • TOE AMPUTATION     • TOTAL HIP ARTHROPLASTY Right 2020       Family History: family history includes Diabetes in his maternal grandfather; Heart disease in his father and mother.    Social History:  reports that he has never smoked. He has never used smokeless tobacco. He reports that he does not drink alcohol and does not use drugs.    Home Medications:  Prior to Admission medications    Medication Sig Start Date End Date Taking? Authorizing Provider   amLODIPine (NORVASC) 5 MG tablet Take 1 tablet by mouth Daily. 22  Yes Andrew Grayson APRN   celecoxib (CeleBREX) 200 MG capsule Take 400 mg by mouth Daily.   Yes Provider, MD Marcial   citalopram (CeleXA) 20 MG tablet Take 1 tablet by mouth Daily. 22  Yes Andrew Grayson APRN   colchicine 0.6 MG tablet Take 1 tablet by mouth 2 (Two) Times a Day. 21  Yes Andrew Grayson APRN   furosemide (LASIX) 40 MG tablet Take 1 tablet by mouth  Daily. 4/29/21  Yes Andrew Grayson APRN   losartan (COZAAR) 50 MG tablet Take 1 tablet by mouth Daily. 7/11/22  Yes Andrew Grayson APRN   meloxicam (MOBIC) 15 MG tablet Take 1 tablet by mouth Daily. 7/11/22  Yes Andrew Grayson APRN   mupirocin (BACTROBAN) 2 % ointment  12/17/19  Yes Marcial Ospina MD   oxyCODONE (ROXICODONE) 10 MG tablet 10 mg. 2/5/21  Yes Marcial Ospina MD   pantoprazole (PROTONIX) 40 MG EC tablet TAKE ONE TABLET BY MOUTH DAILY 7/18/22  Yes Andrew Grayson APRN   allopurinol (ZYLOPRIM) 100 MG tablet Take 1 tablet by mouth twice daily 4/29/21   Andrew Grayson APRN   amantadine (SYMMETREL) 100 MG tablet  7/30/21   Marcial Ospina MD   atorvastatin (LIPITOR) 80 MG tablet Take 1 tablet by mouth Daily. 7/11/22   Andrew Grayson APRN   carvedilol (COREG) 25 MG tablet TAKE ONE TABLET BY MOUTH TWICE DAILY 7/18/22   Andrew Grayson APRN   folic acid (FOLVITE) 1 MG tablet Take 1 mg by mouth Daily. 11/18/21   Marcial Ospina MD   lactulose (CHRONULAC) 10 GM/15ML solution Take 30 mL by mouth 2 (two) times a day. 5/26/21   Andrew Grayson APRN   Menthol, Topical Analgesic, (Biofreeze Roll-On) 4 % gel Apply 1 application topically 4 (Four) Times a Day As Needed (pain). 5/4/22   Andrew Grayson APRN   methylPREDNISolone (MEDROL) 4 MG dose pack Take as directed on package instructions. 5/4/22   Andrew Grayson APRN   Misc. Devices (TRANSFER BENCH) misc 1 each Daily. To use while showering. 11/11/19   Cristela Alegria MD   spironolactone (ALDACTONE) 25 MG tablet   mg Tab, Oral, BID, 0 Refill(s) 11/18/21   ProviderMarcial MD   thiamine (VITAMIN B-1) 100 MG tablet Take 1 tablet by mouth Daily. 12/1/21   Grayson, Andrew M, APRN   Xifaxan 550 MG tablet  10/6/20   Provider, Historical, MD   zinc gluconate 50 MG tablet  3/1/21   Provider, Historical, MD       Allergies:  Allergies   Allergen Reactions   • Indomethacin Other (See Comments)     Lips sting and lightheadedness        Objective     Vitals:   Temp:  [98.4 °F (36.9 °C)-98.7 °F (37.1 °C)] 98.4 °F (36.9 °C)  Heart Rate:  [54-83] 83  Resp:  [18-20] 20  BP: (113-155)/(64-97) 129/87    Intake/Output Summary (Last 24 hours) at 8/1/2022 1650  Last data filed at 8/1/2022 1321  Gross per 24 hour   Intake 1260 ml   Output 375 ml   Net 885 ml       Physical Exam:   Constitutional: Awake but blunted; cannot ID month or year; overweight, chr ill  HEENT: Sclera anicteric, no conjunctival injection  Neck: Supple, no carotid bruit, trachea at midline, no JVD  Respiratory: Coarse BS, nonlabored on RA  Cardiovascular: RRR, no rub  Gastrointestinal: + BS, soft, nontender, slightly distended  : No palpable bladder  Musculoskeletal: Trace edema, right foot wrapped, large tophi R > L hands  Psychiatric: Very flat affect, cooperative  Neurologic:  moving all extremities, normal speech and mental status  Skin: Warm and dry       Scheduled Meds:     allopurinol, 100 mg, Oral, Daily  cadexomer iodine, 1 application, Topical, Once per day on Mon Thu  carvedilol, 12.5 mg, Oral, BID With Meals  citalopram, 20 mg, Oral, Daily  [START ON 8/2/2022] multivitamin, 1 tablet, Oral, Daily   And  [START ON 8/2/2022] folic acid, 1 mg, Oral, Daily  lactulose, 30 g, Oral, TID  pantoprazole, 40 mg, Oral, QAM  rifAXIMin, 550 mg, Oral, Q12H  sodium chloride, 10 mL, Intravenous, Q12H  thiamine (VITAMIN B1) IVPB, 500 mg, Intravenous, Daily  [START ON 8/2/2022] thiamine, 100 mg, Oral, Daily  zinc sulfate, 220 mg, Oral, Daily      IV Meds:   sodium chloride, 125 mL/hr, Last Rate: 125 mL/hr (07/31/22 2339)        Results Reviewed:   I have personally reviewed the results from the time of this admission to 8/1/2022 16:50 EDT     Lab Results   Component Value Date    GLUCOSE 95 08/01/2022    CALCIUM 9.2 08/01/2022     08/01/2022    K 4.1 08/01/2022    CO2 14.7 (L) 08/01/2022     (H) 08/01/2022    BUN 30 (H) 08/01/2022    CREATININE 2.45 (H) 08/01/2022     EGFRIFAFRI 74 08/05/2021    EGFRIFNONA 64 08/05/2021    BCR 12.2 08/01/2022    ANIONGAP 15.3 (H) 08/01/2022      Lab Results   Component Value Date    MG 2.4 08/01/2022    ALBUMIN 3.30 (L) 08/01/2022           Assessment / Plan     ASSESSMENT:  1.  YANNI, with UOP only partially recorded today, improving prerenal due to poor oral intake for several days, NSAIDs, and compromised renal autoregulation while on ARB and diuretic.  Hepatitis C-related GN always a concern, though he reports completing treatment in [place].  Volume status is stable; NAGMA, though AG 19 yesterday and 15 today; normal potassium.  Urinalysis with large blood, 13-20 RBCs, 30 mg/dL protein, pyuria, but also squamous cells  2.  Alcoholic cirrhosis with elevated ammonia level  3.  Metabolic encephalopathy  4.  Tophaceous gout      PLAN:  1.  Continue IVF, but change to half-normal saline  2.  Begin oral sodium bicarbonate  3.  Check hepatitis C viral load to document treatment/cure  4.  FENa  5.  Bladder scan  6.  Will increase allopurinol dose once YNANI has resolved    Thank you for involving us in the care of Macario Carpio.  Please feel free to call with any questions.    Seferino Cid MD  08/01/22  16:50 EDT    [facility]  [phone]      Much of this encounter note is an electronic transcription/translation of spoken language to printed text. The electronic translation of spoken language may permit erroneous, or at times, nonsensical words or phrases to be inadvertently transcribed; Although I have reviewed the note for such errors, some may still exist.

## 2022-08-01 NOTE — PROGRESS NOTES
Name: Macario Carpio ADMIT: 2022   : 1970  PCP: GraysonAndrew, CALLUM    MRN: [id number] LOS: 1 days   AGE/SEX: 51 y.o. male  ROOM: [facility]   Subjective   Chief Complaint   Patient presents with   • Altered Mental Status      Patient is awake.  He is still confused and disoriented however he is able to provide some history.  He reports his last alcohol use was 5 days ago.  He had been confused since last night.  He reports longstanding pain from tophaceous gout which is apparent in hands left elbow and also his feet.  I did not see any area of acute erythema.  He does have a wound on his right foot near the site of previous amputation of his toe.  This appears eschared over.  He was reporting pain from his gout and I asked him if there was any particular location that was worse and he stated no.  He reports he goes to pain therapy and takes chronic medication for this.  He is not reporting any chest pain palpitations or shortness of breath.  He is not reporting any nausea vomiting or abdominal pain.  He was not reporting any dysuria to me.  Again patient still seems to be somewhat poor historian.    Objective   Vital Signs  Temp:  [97.4 °F (36.3 °C)-98.7 °F (37.1 °C)] 98.4 °F (36.9 °C)  Heart Rate:  [54-83] 83  Resp:  [17-20] 20  BP: (113-155)/(64-97) 129/87  SpO2:  [98 %-100 %] 99 %  on   ;   Device (Oxygen Therapy): room air  Body mass index is 27.7 kg/m².    Physical Exam  Vitals and nursing note reviewed.   Constitutional:       Appearance: He is ill-appearing (chronically). He is not diaphoretic.   HENT:      Head: Normocephalic and atraumatic.   Eyes:      General:         Right eye: No discharge.         Left eye: No discharge.      Conjunctiva/sclera: Conjunctivae normal.   Cardiovascular:      Rate and Rhythm: Normal rate and regular rhythm.      Pulses: Normal pulses.   Pulmonary:      Effort: Pulmonary effort is normal.      Breath sounds: Decreased breath sounds present. No wheezing.    Abdominal:      General: There is no distension.      Palpations: Abdomen is soft.      Tenderness: There is no abdominal tenderness. There is no guarding or rebound.   Musculoskeletal:         General: Swelling, tenderness and deformity present.      Cervical back: Neck supple. No tenderness.      Right lower leg: No edema.      Left lower leg: No edema.      Comments: Tenderness over joint of both hand and left elbow, tophi present diffusely. Scabbed wound right foot. Prior toe amputation.   Skin:     General: Skin is warm and dry.      Findings: Lesion present.   Neurological:      Mental Status: He is alert. He is disoriented.   Psychiatric:         Cognition and Memory: Cognition is impaired. Memory is impaired.         Results Review:       I reviewed the patient's new clinical results.     I reviewed imaging, agree with interpretation.     I reviewed telemetry/EKG results, sinus on telemetry      Results from last 7 days   Lab Units 08/01/22 1000 07/31/22  1632   WBC 10*3/mm3 5.59 6.14   HEMOGLOBIN g/dL 11.2* 13.3   PLATELETS 10*3/mm3 108* 161     Results from last 7 days   Lab Units 08/01/22 1000 07/31/22  1736   SODIUM mmol/L 141 142   POTASSIUM mmol/L 4.1 4.6   CHLORIDE mmol/L 111* 108*   CO2 mmol/L 14.7* 15.2*   BUN mg/dL 30* 29*   CREATININE mg/dL 2.45* 2.79*   GLUCOSE mg/dL 95 85   Estimated Creatinine Clearance: 45.5 mL/min (A) (by C-G formula based on SCr of 2.45 mg/dL (H)).  Results from last 7 days   Lab Units 08/01/22 1000 07/31/22  1736 07/31/22  1632   CALCIUM mg/dL 9.2 9.5  --    ALBUMIN g/dL 3.30* 3.90  --    MAGNESIUM mg/dL 2.4  --  1.2*     Results from last 7 days   Lab Units 08/01/22 1000 07/31/22  1632   INR  1.28* 1.27*        allopurinol, 100 mg, Oral, Daily  cadexomer iodine, 1 application, Topical, Once per day on Mon Thu  carvedilol, 12.5 mg, Oral, BID With Meals  citalopram, 20 mg, Oral, Daily  [START ON 8/2/2022] multivitamin, 1 tablet, Oral, Daily   And  [START ON 8/2/2022]  folic acid, 1 mg, Oral, Daily  lactulose, 30 g, Oral, TID  pantoprazole, 40 mg, Oral, QAM  rifAXIMin, 550 mg, Oral, Q12H  sodium chloride, 10 mL, Intravenous, Q12H  thiamine (VITAMIN B1) IVPB, 500 mg, Intravenous, Daily  [START ON 8/2/2022] thiamine, 100 mg, Oral, Daily  zinc sulfate, 220 mg, Oral, Daily      sodium chloride, 125 mL/hr, Last Rate: 125 mL/hr (07/31/22 2339)    Diet Regular    Assessment & Plan      Active Hospital Problems    Diagnosis  POA   • **Hepatic encephalopathy (HCC) [K72.90]  Yes   • 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       · Hepatic encephalopathy: Patient has cirrhosis and recent alcohol use.  Ammonia was elevated.  He also appears to have YANNI on top of some degree of CKD.  There was no obstruction noted on the CT scan.  Creatinine mostly stable overnight although he does have metabolic acidosis.  Plan to continue the lactulose and Xifaxan.  Continue vitamin supplementation.  We will send additional urine studies and consult nephrology and gastroenterology.  · Alcohol/HCV: See above.  Benzodiazepine if needed.  · Tophaceous gout: Diffuse areas.  Since he is a bit more alert will resume a lower dose of oxycodone as needed.  Patient is aware that alcohol is contributing to his issues with gout.  · Gallstones: Patient's not having any right upper quadrant tenderness and bilirubin was okay.  We will follow-up GI recommendations.  · Low magnesium secondary to alcohol abuse: Improved.  Monitor.  · Hypertension: Acceptable acutely.  · Chronic wound: Wound care consult  · Prophylaxis: SCD.  Possibly transition to chemical prophylaxis tomorrow if no procedure needed.  · Disposition: TBD    Seferino Van MD  [facility]  08/01/22  13:24 EDT    Dictated portions using Dragon dictation software.    During the entire encounter, I was wearing recommended PPE including face mask and eye protection. Hand sanitization was performed prior to entering room and upon exit.

## 2022-08-02 LAB
ALBUMIN SERPL-MCNC: 3 G/DL (ref 3.5–5.2)
ALBUMIN/GLOB SERPL: 1 G/DL
ALP SERPL-CCNC: 75 U/L (ref 39–117)
ALT SERPL W P-5'-P-CCNC: 10 U/L (ref 1–41)
AMMONIA BLD-SCNC: 50 UMOL/L (ref 16–60)
ANION GAP SERPL CALCULATED.3IONS-SCNC: 12.3 MMOL/L (ref 5–15)
AST SERPL-CCNC: 27 U/L (ref 1–40)
BACTERIA SPEC AEROBE CULT: NO GROWTH
BASOPHILS # BLD AUTO: 0.04 10*3/MM3 (ref 0–0.2)
BASOPHILS NFR BLD AUTO: 0.9 % (ref 0–1.5)
BILIRUB SERPL-MCNC: 0.6 MG/DL (ref 0–1.2)
BUN SERPL-MCNC: 22 MG/DL (ref 6–20)
BUN/CREAT SERPL: 13.4 (ref 7–25)
CALCIUM SPEC-SCNC: 8.4 MG/DL (ref 8.6–10.5)
CHLORIDE SERPL-SCNC: 111 MMOL/L (ref 98–107)
CO2 SERPL-SCNC: 14.7 MMOL/L (ref 22–29)
CREAT SERPL-MCNC: 1.64 MG/DL (ref 0.76–1.27)
CREAT UR-MCNC: 111.2 MG/DL
DEPRECATED RDW RBC AUTO: 46.8 FL (ref 37–54)
EGFRCR SERPLBLD CKD-EPI 2021: 50.3 ML/MIN/1.73
EOSINOPHIL # BLD AUTO: 0.13 10*3/MM3 (ref 0–0.4)
EOSINOPHIL NFR BLD AUTO: 2.9 % (ref 0.3–6.2)
ERYTHROCYTE [DISTWIDTH] IN BLOOD BY AUTOMATED COUNT: 14.5 % (ref 12.3–15.4)
GLOBULIN UR ELPH-MCNC: 3 GM/DL
GLUCOSE SERPL-MCNC: 90 MG/DL (ref 65–99)
HCT VFR BLD AUTO: 29 % (ref 37.5–51)
HGB BLD-MCNC: 10.1 G/DL (ref 13–17.7)
IMM GRANULOCYTES # BLD AUTO: 0.02 10*3/MM3 (ref 0–0.05)
IMM GRANULOCYTES NFR BLD AUTO: 0.5 % (ref 0–0.5)
INR PPP: 1.3 (ref 0.9–1.1)
LYMPHOCYTES # BLD AUTO: 1.11 10*3/MM3 (ref 0.7–3.1)
LYMPHOCYTES NFR BLD AUTO: 25.1 % (ref 19.6–45.3)
MAGNESIUM SERPL-MCNC: 1.7 MG/DL (ref 1.6–2.6)
MCH RBC QN AUTO: 31.2 PG (ref 26.6–33)
MCHC RBC AUTO-ENTMCNC: 34.8 G/DL (ref 31.5–35.7)
MCV RBC AUTO: 89.5 FL (ref 79–97)
MONOCYTES # BLD AUTO: 0.42 10*3/MM3 (ref 0.1–0.9)
MONOCYTES NFR BLD AUTO: 9.5 % (ref 5–12)
NEUTROPHILS NFR BLD AUTO: 2.7 10*3/MM3 (ref 1.7–7)
NEUTROPHILS NFR BLD AUTO: 61.1 % (ref 42.7–76)
NRBC BLD AUTO-RTO: 0 /100 WBC (ref 0–0.2)
PHOSPHATE SERPL-MCNC: 3.6 MG/DL (ref 2.5–4.5)
PLATELET # BLD AUTO: 90 10*3/MM3 (ref 140–450)
PMV BLD AUTO: 11 FL (ref 6–12)
POTASSIUM SERPL-SCNC: 3.7 MMOL/L (ref 3.5–5.2)
PROT SERPL-MCNC: 6 G/DL (ref 6–8.5)
PROTHROMBIN TIME: 16 SECONDS (ref 11.7–14.2)
RBC # BLD AUTO: 3.24 10*6/MM3 (ref 4.14–5.8)
SODIUM SERPL-SCNC: 138 MMOL/L (ref 136–145)
SODIUM UR-SCNC: 61 MMOL/L
URATE SERPL-MCNC: 8.4 MG/DL (ref 3.4–7)
WBC NRBC COR # BLD: 4.42 10*3/MM3 (ref 3.4–10.8)

## 2022-08-02 PROCEDURE — 25010000002 DIAZEPAM PER 5 MG: Performed by: INTERNAL MEDICINE

## 2022-08-02 PROCEDURE — 99232 SBSQ HOSP IP/OBS MODERATE 35: CPT | Performed by: INTERNAL MEDICINE

## 2022-08-02 PROCEDURE — 80053 COMPREHEN METABOLIC PANEL: CPT | Performed by: INTERNAL MEDICINE

## 2022-08-02 PROCEDURE — 84550 ASSAY OF BLOOD/URIC ACID: CPT | Performed by: INTERNAL MEDICINE

## 2022-08-02 PROCEDURE — 85025 COMPLETE CBC W/AUTO DIFF WBC: CPT | Performed by: INTERNAL MEDICINE

## 2022-08-02 PROCEDURE — 85610 PROTHROMBIN TIME: CPT | Performed by: INTERNAL MEDICINE

## 2022-08-02 PROCEDURE — 87522 HEPATITIS C REVRS TRNSCRPJ: CPT | Performed by: INTERNAL MEDICINE

## 2022-08-02 PROCEDURE — 63710000001 ONDANSETRON PER 8 MG: Performed by: INTERNAL MEDICINE

## 2022-08-02 PROCEDURE — 84100 ASSAY OF PHOSPHORUS: CPT | Performed by: INTERNAL MEDICINE

## 2022-08-02 PROCEDURE — 83735 ASSAY OF MAGNESIUM: CPT | Performed by: INTERNAL MEDICINE

## 2022-08-02 PROCEDURE — 82140 ASSAY OF AMMONIA: CPT | Performed by: INTERNAL MEDICINE

## 2022-08-02 RX ADMIN — SODIUM CHLORIDE 100 ML/HR: 4.5 INJECTION, SOLUTION INTRAVENOUS at 03:17

## 2022-08-02 RX ADMIN — LACTULOSE 30 G: 10 SOLUTION ORAL at 08:24

## 2022-08-02 RX ADMIN — ALLOPURINOL 100 MG: 100 TABLET ORAL at 08:25

## 2022-08-02 RX ADMIN — DIAZEPAM 5 MG: 5 INJECTION, SOLUTION INTRAMUSCULAR; INTRAVENOUS at 04:23

## 2022-08-02 RX ADMIN — Medication 220 MG: at 08:25

## 2022-08-02 RX ADMIN — Medication 10 ML: at 20:01

## 2022-08-02 RX ADMIN — LACTULOSE 30 G: 10 SOLUTION ORAL at 20:01

## 2022-08-02 RX ADMIN — RIFAXIMIN 550 MG: 550 TABLET ORAL at 20:01

## 2022-08-02 RX ADMIN — Medication 1 TABLET: at 08:25

## 2022-08-02 RX ADMIN — CITALOPRAM 20 MG: 20 TABLET, FILM COATED ORAL at 08:25

## 2022-08-02 RX ADMIN — CARVEDILOL 12.5 MG: 12.5 TABLET, FILM COATED ORAL at 08:25

## 2022-08-02 RX ADMIN — DIAZEPAM 5 MG: 5 INJECTION, SOLUTION INTRAMUSCULAR; INTRAVENOUS at 16:01

## 2022-08-02 RX ADMIN — Medication 1 MG: at 08:29

## 2022-08-02 RX ADMIN — RIFAXIMIN 550 MG: 550 TABLET ORAL at 08:25

## 2022-08-02 RX ADMIN — OXYCODONE 5 MG: 5 TABLET ORAL at 16:01

## 2022-08-02 RX ADMIN — Medication 100 MG: at 08:25

## 2022-08-02 RX ADMIN — OXYCODONE 5 MG: 5 TABLET ORAL at 20:05

## 2022-08-02 RX ADMIN — DIAZEPAM 5 MG: 5 INJECTION, SOLUTION INTRAMUSCULAR; INTRAVENOUS at 19:08

## 2022-08-02 RX ADMIN — OXYCODONE 5 MG: 5 TABLET ORAL at 08:25

## 2022-08-02 RX ADMIN — DIAZEPAM 5 MG: 5 INJECTION, SOLUTION INTRAMUSCULAR; INTRAVENOUS at 14:01

## 2022-08-02 RX ADMIN — ONDANSETRON HYDROCHLORIDE 4 MG: 4 TABLET, FILM COATED ORAL at 03:16

## 2022-08-02 RX ADMIN — CARVEDILOL 12.5 MG: 12.5 TABLET, FILM COATED ORAL at 18:49

## 2022-08-02 RX ADMIN — SODIUM CHLORIDE 100 ML/HR: 4.5 INJECTION, SOLUTION INTRAVENOUS at 14:01

## 2022-08-02 RX ADMIN — OXYCODONE 5 MG: 5 TABLET ORAL at 03:17

## 2022-08-02 RX ADMIN — OXYCODONE 5 MG: 5 TABLET ORAL at 23:46

## 2022-08-02 RX ADMIN — DIAZEPAM 5 MG: 5 INJECTION, SOLUTION INTRAMUSCULAR; INTRAVENOUS at 23:49

## 2022-08-02 RX ADMIN — DIAZEPAM 5 MG: 5 INJECTION, SOLUTION INTRAMUSCULAR; INTRAVENOUS at 11:13

## 2022-08-02 RX ADMIN — LORAZEPAM 1 MG: 1 TABLET ORAL at 02:16

## 2022-08-02 RX ADMIN — SODIUM CHLORIDE 100 ML/HR: 4.5 INJECTION, SOLUTION INTRAVENOUS at 23:48

## 2022-08-02 RX ADMIN — SODIUM BICARBONATE 1300 MG: 650 TABLET ORAL at 08:25

## 2022-08-02 RX ADMIN — Medication 10 ML: at 08:29

## 2022-08-02 RX ADMIN — DIAZEPAM 10 MG: 5 INJECTION, SOLUTION INTRAMUSCULAR; INTRAVENOUS at 21:02

## 2022-08-02 RX ADMIN — PANTOPRAZOLE SODIUM 40 MG: 40 TABLET, DELAYED RELEASE ORAL at 06:41

## 2022-08-02 RX ADMIN — DIAZEPAM 5 MG: 5 INJECTION, SOLUTION INTRAMUSCULAR; INTRAVENOUS at 06:41

## 2022-08-02 NOTE — PLAN OF CARE
Goal Outcome Evaluation:  Plan of Care Reviewed With: patient        Progress: improving  Outcome Evaluation: pt remains AO to self and place.  Pt very impulsive with poor safety awareness.  CIWA score 14 this afternoon.  Pt refused afternoon dose of lactulose.  VSS.  All needs met this shift.

## 2022-08-02 NOTE — PROGRESS NOTES
[facility] Progress Note      Patient Name: Macario Carpio  : 1970  MRN: [id number]  Primary Care Physician:  Andrew Grayson APRN  Date of admission: 2022    Subjective     Interval History:   Follow up YANNI.  Slow to respond, but does know he is in [place].  Complains of gout pain. Urinating .     Review of Systems:   As noted above    Objective     Vitals:   Heart Rate:  [60-72] 63  Resp:  [18-20] 18  BP: (118-141)/() 118/75    Intake/Output Summary (Last 24 hours) at 2022 1448  Last data filed at 2022 1100  Gross per 24 hour   Intake 2020 ml   Output 901 ml   Net 1119 ml       Physical Exam:    General Appearance:Slow to respond, but does answer questions . Speech difficult to understand because he does not open his mouth normally to speak .  Skin: warm and dry  HEENT: oral mucosa normal, nonicteric sclera  Neck: supple, no JVD  Lungs: Clear to auscultation   Heart: RRR, normal S1 and S2  Abdomen: soft, nontender, nondistended. + bs  Extremities: no edema. Multiple tophi elbows, hands, feet.   Neuro: slow speech. Oriented to self and city .    Scheduled Meds:     allopurinol, 100 mg, Oral, Daily  cadexomer iodine, 1 application, Topical, Once per day on   carvedilol, 12.5 mg, Oral, BID With Meals  citalopram, 20 mg, Oral, Daily  multivitamin, 1 tablet, Oral, Daily   And  folic acid, 1 mg, Oral, Daily  lactulose, 30 g, Oral, TID  pantoprazole, 40 mg, Oral, QAM  rifAXIMin, 550 mg, Oral, Q12H  sodium bicarbonate, 1,300 mg, Oral, BID  sodium chloride, 10 mL, Intravenous, Q12H  thiamine, 100 mg, Oral, Daily  zinc sulfate, 220 mg, Oral, Daily      IV Meds:   sodium chloride, 100 mL/hr, Last Rate: 100 mL/hr (22 1401)        Results Reviewed:   I have personally reviewed the results from the time of this admission to 2022 14:48 EDT     Results from last 7 days   Lab Units 22  0637 22  1000 22  1736   SODIUM mmol/L 138 141  142   POTASSIUM mmol/L 3.7 4.1 4.6   CHLORIDE mmol/L 111* 111* 108*   CO2 mmol/L 14.7* 14.7* 15.2*   BUN mg/dL 22* 30* 29*   CREATININE mg/dL 1.64* 2.45* 2.79*   CALCIUM mg/dL 8.4* 9.2 9.5   BILIRUBIN mg/dL 0.6 0.8 1.5*   ALK PHOS U/L 75 77 87   ALT (SGPT) U/L 10 10 9   AST (SGOT) U/L 27 28 36   GLUCOSE mg/dL 90 95 85       Estimated Creatinine Clearance: 67.9 mL/min (A) (by C-G formula based on SCr of 1.64 mg/dL (H)).    Results from last 7 days   Lab Units 08/02/22  0637 08/01/22  1000 07/31/22  1632   MAGNESIUM mg/dL 1.7 2.4 1.2*   PHOSPHORUS mg/dL 3.6  --   --        Results from last 7 days   Lab Units 08/02/22  0637 08/01/22  1000   URIC ACID mg/dL 8.4* 9.1*       Results from last 7 days   Lab Units 08/02/22  0637 08/01/22  1000 07/31/22  1632   WBC 10*3/mm3 4.42 5.59 6.14   HEMOGLOBIN g/dL 10.1* 11.2* 13.3   PLATELETS 10*3/mm3 90* 108* 161       Results from last 7 days   Lab Units 08/02/22  0637 08/01/22  1000 07/31/22  1632   INR  1.30* 1.28* 1.27*       Assessment / Plan     ASSESSMENT:  1. YANNI, multifactorial: hypovolemia, NSAID use, ARB+ diuretic .  Chronic respiratory alkalosis due to liver disease.  Confirmed by ABG.  Will dc the po bicarb for now .  2. ETOH abuse, cirrhosis .  3. Encephalopathy with elevated ammonia .  4. Hep C  5. Substance abuse .  6. Tophaceous gout .  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  8. HTN. On coreg and norvasc at home.    PLAN:  1. Continue IVF .  2. DC po bicarbonate.  Robyn Werner MD  08/02/22  14:48 EDT    [facility]  [phone]

## 2022-08-02 NOTE — DISCHARGE PLACEMENT REQUEST
"Igor Carpio (51 y.o. Male)             Date of Birth   1970    Social Security Number       Address   [address] [place] [address]    Home Phone   [phone]    MRN   [id number]       [employer]   None    Marital Status   Single                            Admission Date   7/31/22    Admission Type   Emergency    Admitting Provider   Foreign Wilkins MD    Attending Provider   Seferino Van MD    Department, Room/Bed   [address], S[address]/1       Discharge Date       Discharge Disposition       Discharge Destination                               Attending Provider: Seferino Van MD    Allergies: Indomethacin    Isolation: None   Infection: None   Code Status: CPR   Advance Care Planning Activity    Ht: 180.3 cm (71\")   Wt: 90.1 kg (198 lb 9.6 oz)    Admission Cmt: None   Principal Problem: Hepatic encephalopathy (HCC) [K72.90]                 Active Insurance as of 7/31/2022     Primary Coverage     Payor Plan Insurance Group Employer/Plan Group    HUMANA MEDICARE REPLACEMENT HUMANA MEDICARE REPLACEMENT [id number]     Payor Plan Address Payor Plan Phone Number Payor Plan Fax Number Effective Dates    [address] [address] [phone]  11/1/2019 - None Entered    [facility] [id number]       Subscriber Name Subscriber Birth Date Member ID       IGOR CARPIO 1970 [id number]                 Emergency Contacts      (Rel.) Home Phone Work Phone Mobile Phone    HERBIE WHITTINGTON (Spouse) [phone] -- [phone]    Cedrick Mary Ann Carpio (Mother) [phone] -- --              "

## 2022-08-02 NOTE — PROGRESS NOTES
Name: Macario Carpio ADMIT: 2022   : 1970  PCP: Andrew Grayson, APRN    MRN: [id number] LOS: 2 days   AGE/SEX: 51 y.o. male  ROOM: [facility]   Subjective   Chief Complaint   Patient presents with   • Altered Mental Status      Awake although intermittently confused.  He was redirectable for me.  He denied chest pain palpitation shortness of breath nausea vomiting diarrhea and abdominal pain.  He has peripheral pain which is related to chronic tophaceous gout multiple sites.    Objective   Vital Signs  Temp:  [98.4 °F (36.9 °C)] 98.4 °F (36.9 °C)  Heart Rate:  [60-83] 63  Resp:  [18-20] 18  BP: (118-141)/() 118/75  SpO2:  [96 %-100 %] 100 %  on   ;   Device (Oxygen Therapy): room air  Body mass index is 27.7 kg/m².    Physical Exam  Vitals and nursing note reviewed.   Constitutional:       Appearance: He is ill-appearing (chronically). He is not diaphoretic.   HENT:      Head: Normocephalic and atraumatic.   Eyes:      General:         Right eye: No discharge.         Left eye: No discharge.      Conjunctiva/sclera: Conjunctivae normal.   Cardiovascular:      Rate and Rhythm: Normal rate and regular rhythm.      Pulses: Normal pulses.   Pulmonary:      Effort: Pulmonary effort is normal.      Breath sounds: Decreased breath sounds present. No wheezing.   Abdominal:      General: There is no distension.      Palpations: Abdomen is soft.      Tenderness: There is no abdominal tenderness. There is no guarding or rebound.   Musculoskeletal:         General: Swelling, tenderness and deformity present.      Cervical back: Neck supple. No tenderness.      Right lower leg: No edema.      Left lower leg: No edema.      Comments: Tenderness over joint of both hand and left elbow, tophi present diffusely. Scabbed wound right foot and prior great toe amputation now dressed.   Skin:     General: Skin is warm and dry.   Neurological:      Mental Status: He is alert. He is disoriented.   Psychiatric:          Cognition and Memory: Cognition is impaired. Memory is impaired.         Results Review:       I reviewed the patient's new clinical results.        Results from last 7 days   Lab Units 08/02/22 0637 08/01/22 1000 07/31/22  1632   WBC 10*3/mm3 4.42 5.59 6.14   HEMOGLOBIN g/dL 10.1* 11.2* 13.3   PLATELETS 10*3/mm3 90* 108* 161     Results from last 7 days   Lab Units 08/02/22 0637 08/01/22 1000 07/31/22  1736   SODIUM mmol/L 138 141 142   POTASSIUM mmol/L 3.7 4.1 4.6   CHLORIDE mmol/L 111* 111* 108*   CO2 mmol/L 14.7* 14.7* 15.2*   BUN mg/dL 22* 30* 29*   CREATININE mg/dL 1.64* 2.45* 2.79*   GLUCOSE mg/dL 90 95 85   Estimated Creatinine Clearance: 67.9 mL/min (A) (by C-G formula based on SCr of 1.64 mg/dL (H)).  Results from last 7 days   Lab Units 08/02/22 0637 08/01/22 1000 07/31/22  1736 07/31/22  1632   CALCIUM mg/dL 8.4* 9.2 9.5  --    ALBUMIN g/dL 3.00* 3.30* 3.90  --    MAGNESIUM mg/dL 1.7 2.4  --  1.2*   PHOSPHORUS mg/dL 3.6  --   --   --      Results from last 7 days   Lab Units 08/02/22 0637 08/01/22 1000 07/31/22  1632   INR  1.30* 1.28* 1.27*        allopurinol, 100 mg, Oral, Daily  cadexomer iodine, 1 application, Topical, Once per day on Mon Thu  carvedilol, 12.5 mg, Oral, BID With Meals  citalopram, 20 mg, Oral, Daily  multivitamin, 1 tablet, Oral, Daily   And  folic acid, 1 mg, Oral, Daily  lactulose, 30 g, Oral, TID  pantoprazole, 40 mg, Oral, QAM  rifAXIMin, 550 mg, Oral, Q12H  sodium bicarbonate, 1,300 mg, Oral, BID  sodium chloride, 10 mL, Intravenous, Q12H  thiamine, 100 mg, Oral, Daily  zinc sulfate, 220 mg, Oral, Daily      sodium chloride, 100 mL/hr, Last Rate: 100 mL/hr (08/02/22 0317)    Diet Regular    Assessment & Plan      Active Hospital Problems    Diagnosis  POA   • **Hepatic encephalopathy (HCC) [K72.90]  Yes   • Tophaceous gout [M1A.9XX1]  Yes   • YANNI (acute kidney injury) (HCC) [N17.9]  Yes   • Hypomagnesemia [E83.42]  Yes   • Alcoholic cirrhosis of liver without ascites  (HCC) [K70.30]  Yes   • Hypertensive disorder [I10]  Yes      Resolved Hospital Problems   No resolved problems to display.       · Hepatic encephalopathy/alcoholic cirrhosis: Continue lactulose and Xifaxan.  Planned EGD eventually.  GI following.  · YANNI on CKD: Unsure of baseline.  Creatinine is improving with IV fluids.  Nephrology following.  · Alcohol abuse/HCV: See above.  Benzodiazepine if needed.  Vitamin supplementation.  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  Continue allopurinol.  Alcohol abstinence will be important in controlling this disease progression.  · Gallstones: No right upper quadrant tenderness.  GI following as above  · Low magnesium secondary to alcohol abuse: Replace as needed.  · Hypertension: Acceptable acutely.  · Chronic wound: Dressed.  Wound care following.  · Prophylaxis: SCD.  He is having some worsening of thrombocytopenia so do not want to start chemical prophylaxis at this time.  · Disposition: TBD    Seferino Van MD  [facility]  08/02/22  12:30 EDT    Dictated portions using Dragon dictation software.    During the entire encounter, I was wearing recommended PPE including face mask and eye protection. Hand sanitization was performed prior to entering room and upon exit.

## 2022-08-02 NOTE — NURSING NOTE
Called lab to verify add on orders for random Na and creat urine.  Specimen in lab and labs will be resulted per

## 2022-08-02 NOTE — CASE MANAGEMENT/SOCIAL WORK
Continued Stay Note  [facility]     Patient Name: Macario Carpio  MRN: [id number]  Today's Date: 8/2/2022    Admit Date: 7/31/2022     Discharge Plan     Row Name 08/02/22 0750       Plan    Plan Comments Per [facility], patient is current with them for skilled nursing. Radha Farrar RN               Discharge Codes    No documentation.               Expected Discharge Date and Time     Expected Discharge Date Expected Discharge Time    Aug 4, 2022             Radha Farrar RN

## 2022-08-02 NOTE — NURSING NOTE
Access unable to complete initial evaluation due to patient oriented to self and place only, also suffering from emesis. Access will re-attempt when patient becomes appropriate for interview.

## 2022-08-02 NOTE — PLAN OF CARE
Problem: Adult Inpatient Plan of Care  Goal: Plan of Care Review  Outcome: Ongoing, Progressing  Flowsheets (Taken 8/2/2022 5990)  Progress: no change  Outcome Evaluation: Patient alert to self and place only, VSS, multiple loose bm's through the night, CIWA 11 treated with ativan but switched to valium during the night, patient is restless, nauseaous with emesis, repeated requests from staff, doesn't remember to use call light, oxy q4hrs for extremity pain, inappropriate with staff at times, not able to sleep all night till about 6am, will continue to monitor.

## 2022-08-02 NOTE — PROGRESS NOTES
[facility]  Inpatient Progress Note    Reason for Follow Up: Hepatic encephalopathy, alcoholic/hepatitis C cirrhosis    Subjective     Interval History: Drop in H&H noted.  Serum ammonia is 50.  Sensorium improved but still somewhat slow to respond.      Current Facility-Administered Medications:   •  acetaminophen (TYLENOL) tablet 650 mg, 650 mg, Oral, Q4H PRN **OR** acetaminophen (TYLENOL) 160 MG/5ML solution 650 mg, 650 mg, Oral, Q4H PRN **OR** acetaminophen (TYLENOL) suppository 650 mg, 650 mg, Rectal, Q4H PRN, Foreign Wilkins MD  •  allopurinol (ZYLOPRIM) tablet 100 mg, 100 mg, Oral, Daily, Foreign Wilkins MD, 100 mg at 08/02/22 0825  •  cadexomer iodine (IODOSORB) 0.9 % gel 1 application, 1 application, Topical, Once per day on Mon Thu, Seferino Van MD, 1 application at 08/01/22 1619  •  carvedilol (COREG) tablet 12.5 mg, 12.5 mg, Oral, BID With Meals, Foreign Wilkins MD, 12.5 mg at 08/02/22 0825  •  citalopram (CeleXA) tablet 20 mg, 20 mg, Oral, Daily, Foreign Wilkins MD, 20 mg at 08/02/22 0825  •  LORazepam (ATIVAN) tablet 1 mg, 1 mg, Oral, Q2H PRN, 1 mg at 08/02/22 0216 **OR** diazePAM (VALIUM) injection 5 mg, 5 mg, Intravenous, Q2H PRN, 5 mg at 08/02/22 1113 **OR** LORazepam (ATIVAN) tablet 2 mg, 2 mg, Oral, Q1H PRN **OR** diazePAM (VALIUM) injection 10 mg, 10 mg, Intravenous, Q1H PRN **OR** diazePAM (VALIUM) injection 10 mg, 10 mg, Intravenous, Q30 Min PRN, Foreign Wilkins MD  •  multivitamin (THERAGRAN) tablet 1 tablet, 1 tablet, Oral, Daily, 1 tablet at 08/02/22 0825 **AND** folic acid (FOLVITE) tablet 1 mg, 1 mg, Oral, Daily, Seferino Van MD, 1 mg at 08/02/22 0829  •  lactulose (CHRONULAC) 10 GM/15ML solution 30 g, 30 g, Oral, TID, Foreign Wilkins MD, 30 g at 08/02/22 0824  •  nitroglycerin (NITROSTAT) SL tablet 0.4 mg, 0.4 mg, Sublingual, Q5 Min PRN, Foreign Wilkins MD  •  ondansetron (ZOFRAN) tablet 4 mg, 4 mg, Oral, Q6H PRN, 4  mg at 08/02/22 0316 **OR** ondansetron (ZOFRAN) injection 4 mg, 4 mg, Intravenous, Q6H PRN, Foreign Wilkins MD, 4 mg at 08/01/22 0345  •  oxyCODONE (ROXICODONE) immediate release tablet 5 mg, 5 mg, Oral, Q4H PRN, Seferino Van MD, 5 mg at 08/02/22 0825  •  pantoprazole (PROTONIX) EC tablet 40 mg, 40 mg, Oral, QAM, Foreign Wilkins MD, 40 mg at 08/02/22 0641  •  riFAXIMin (XIFAXAN) tablet 550 mg, 550 mg, Oral, Q12H, Foreign Wilkins MD, 550 mg at 08/02/22 0825  •  sodium bicarbonate tablet 1,300 mg, 1,300 mg, Oral, BID, Seferino Cid MD, 1,300 mg at 08/02/22 0825  •  sodium chloride 0.45 % infusion, 100 mL/hr, Intravenous, Continuous, Seferino Cid MD, Last Rate: 100 mL/hr at 08/02/22 0317, 100 mL/hr at 08/02/22 0317  •  [COMPLETED] Insert peripheral IV, , , Once **AND** sodium chloride 0.9 % flush 10 mL, 10 mL, Intravenous, PRN, Tomasz Robles MD  •  sodium chloride 0.9 % flush 10 mL, 10 mL, Intravenous, Q12H, Foreign Wilkins MD, 10 mL at 08/02/22 0829  •  sodium chloride 0.9 % flush 10 mL, 10 mL, Intravenous, PRN, Foreign Wilkins MD  •  thiamine (VITAMIN B-1) tablet 100 mg, 100 mg, Oral, Daily, Seferino Van MD, 100 mg at 08/02/22 0825  •  zinc sulfate (ZINCATE) capsule 220 mg, 220 mg, Oral, Daily, Foreign Wilkins MD, 220 mg at 08/02/22 0825  Review of Systems:    All systems were reviewed and negative except for:  Gastrointestinal: positive for  See HPI    Objective     Vital Signs  Heart Rate:  [60-72] 63  Resp:  [18-20] 18  BP: (118-141)/() 118/75  Body mass index is 27.7 kg/m².    Intake/Output Summary (Last 24 hours) at 8/2/2022 1351  Last data filed at 8/2/2022 1100  Gross per 24 hour   Intake 2020 ml   Output 901 ml   Net 1119 ml     I/O this shift:  In: 360 [P.O.:360]  Out: 200 [Urine:200]     Physical Exam:   General: patient awake, alert and cooperative   Eyes: Normal lids and lashes, no scleral icterus   Neck: supple, normal ROM   Skin:  warm and dry, not jaundiced   Cardiovascular: regular rhythm and rate, no murmurs auscultated   Pulm: clear to auscultation bilaterally, regular and unlabored   Abdomen: soft, nontender, nondistended; normal bowel sounds   Extremities: no rash or edema   Psychiatric: Normal mood and behavior; memory intact     Results Review:     I reviewed the patient's new clinical results.    Results from last 7 days   Lab Units 08/02/22  0637 08/01/22 1000 07/31/22  1632   WBC 10*3/mm3 4.42 5.59 6.14   HEMOGLOBIN g/dL 10.1* 11.2* 13.3   HEMATOCRIT % 29.0* 32.6* 38.7   PLATELETS 10*3/mm3 90* 108* 161     Results from last 7 days   Lab Units 08/02/22  0637 08/01/22  1000 07/31/22  1736   SODIUM mmol/L 138 141 142   POTASSIUM mmol/L 3.7 4.1 4.6   CHLORIDE mmol/L 111* 111* 108*   CO2 mmol/L 14.7* 14.7* 15.2*   BUN mg/dL 22* 30* 29*   CREATININE mg/dL 1.64* 2.45* 2.79*   CALCIUM mg/dL 8.4* 9.2 9.5   BILIRUBIN mg/dL 0.6 0.8 1.5*   ALK PHOS U/L 75 77 87   ALT (SGPT) U/L 10 10 9   AST (SGOT) U/L 27 28 36   GLUCOSE mg/dL 90 95 85     Results from last 7 days   Lab Units 08/02/22  0637 08/01/22 1000 07/31/22  1632   INR  1.30* 1.28* 1.27*     No results found for: LIPASE    Radiology:  CT Head Without Contrast   Final Result   No acute process identified. Further evaluation could be   performed with an MRI examination of the brain as indicated.       Radiation dose reduction techniques were utilized, including automated   exposure control and exposure modulation based on body size.       This report was finalized on 8/1/2022 7:10 AM by Dr. Juan Boswell M.D.          CT Abdomen Pelvis Without Contrast   Final Result         Electronically signed by Donal Vigil MD on 07-31-22 at 1923      XR Chest 1 View   Final Result          Assessment & Plan     Patient Active Problem List   Diagnosis   • Iron deficiency anemia   • Hepatic cirrhosis (HCC)   • Screening for malignant neoplasm of colon   • Abdominal aortic aneurysm (AAA) (HCC)    • Hepatitis C virus infection   • Gout   • Hypercholesterolemia   • Hepatic cirrhosis (HCC)   • Thoracic aortic aneurysm without rupture (HCC)   • Hypertensive disorder   • Hip pain   • Chronic pain   • Bicuspid aortic valve   • Arthritis of right hip   • Aortic valve regurgitation   • Anxiety   • Abdominal aortic aneurysm (AAA) (HCC)   • Hepatic cirrhosis (HCC)   • Hepatitis C virus infection   • Chronic pain   • Hepatic encephalopathy (HCC)   • Alcoholic cirrhosis of liver without ascites (HCC)   • YANNI (acute kidney injury) (HCC)   • Hypomagnesemia   • Tophaceous gout       Assessment:  1. Cirrhosis: Possible multifactorial (alcohol, hepatitis C)  2. Hepatic encephalopathy  3. Anemia, thrombocytopenia  4. 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    Manuel Ponce MD

## 2022-08-03 ENCOUNTER — APPOINTMENT (OUTPATIENT)
Dept: ULTRASOUND IMAGING | Facility: HOSPITAL | Age: 52
End: 2022-08-03

## 2022-08-03 ENCOUNTER — APPOINTMENT (OUTPATIENT)
Dept: CT IMAGING | Facility: HOSPITAL | Age: 52
End: 2022-08-03

## 2022-08-03 LAB
ALBUMIN SERPL-MCNC: 3.2 G/DL (ref 3.5–5.2)
ANION GAP SERPL CALCULATED.3IONS-SCNC: 12 MMOL/L (ref 5–15)
BUN SERPL-MCNC: 14 MG/DL (ref 6–20)
BUN/CREAT SERPL: 14 (ref 7–25)
CALCIUM SPEC-SCNC: 8.2 MG/DL (ref 8.6–10.5)
CHLORIDE SERPL-SCNC: 109 MMOL/L (ref 98–107)
CO2 SERPL-SCNC: 16 MMOL/L (ref 22–29)
CREAT SERPL-MCNC: 1 MG/DL (ref 0.76–1.27)
DEPRECATED RDW RBC AUTO: 47.7 FL (ref 37–54)
EGFRCR SERPLBLD CKD-EPI 2021: 91.1 ML/MIN/1.73
ERYTHROCYTE [DISTWIDTH] IN BLOOD BY AUTOMATED COUNT: 14.4 % (ref 12.3–15.4)
GLUCOSE SERPL-MCNC: 86 MG/DL (ref 65–99)
HCT VFR BLD AUTO: 29.9 % (ref 37.5–51)
HCV RNA SERPL NAA+PROBE-ACNC: NORMAL IU/ML
HGB BLD-MCNC: 10.2 G/DL (ref 13–17.7)
INR PPP: 1.27 (ref 0.9–1.1)
MAGNESIUM SERPL-MCNC: 1.1 MG/DL (ref 1.6–2.6)
MAGNESIUM SERPL-MCNC: 1.1 MG/DL (ref 1.6–2.6)
MCH RBC QN AUTO: 30.9 PG (ref 26.6–33)
MCHC RBC AUTO-ENTMCNC: 34.1 G/DL (ref 31.5–35.7)
MCV RBC AUTO: 90.6 FL (ref 79–97)
PHOSPHATE SERPL-MCNC: 2.7 MG/DL (ref 2.5–4.5)
PLATELET # BLD AUTO: 86 10*3/MM3 (ref 140–450)
PMV BLD AUTO: 10.4 FL (ref 6–12)
POTASSIUM SERPL-SCNC: 4 MMOL/L (ref 3.5–5.2)
PROTHROMBIN TIME: 15.7 SECONDS (ref 11.7–14.2)
RBC # BLD AUTO: 3.3 10*6/MM3 (ref 4.14–5.8)
SODIUM SERPL-SCNC: 137 MMOL/L (ref 136–145)
TEST INFORMATION: NORMAL
URATE SERPL-MCNC: 7.5 MG/DL (ref 3.4–7)
WBC NRBC COR # BLD: 5.58 10*3/MM3 (ref 3.4–10.8)

## 2022-08-03 PROCEDURE — 74176 CT ABD & PELVIS W/O CONTRAST: CPT

## 2022-08-03 PROCEDURE — 25010000002 MAGNESIUM SULFATE 2 GM/50ML SOLUTION: Performed by: NURSE PRACTITIONER

## 2022-08-03 PROCEDURE — 90791 PSYCH DIAGNOSTIC EVALUATION: CPT

## 2022-08-03 PROCEDURE — 76705 ECHO EXAM OF ABDOMEN: CPT

## 2022-08-03 PROCEDURE — 25010000002 DIAZEPAM PER 5 MG: Performed by: INTERNAL MEDICINE

## 2022-08-03 PROCEDURE — 85027 COMPLETE CBC AUTOMATED: CPT | Performed by: INTERNAL MEDICINE

## 2022-08-03 PROCEDURE — 80069 RENAL FUNCTION PANEL: CPT | Performed by: INTERNAL MEDICINE

## 2022-08-03 PROCEDURE — 25010000002 HYDROMORPHONE PER 4 MG: Performed by: INTERNAL MEDICINE

## 2022-08-03 PROCEDURE — 83735 ASSAY OF MAGNESIUM: CPT | Performed by: NURSE PRACTITIONER

## 2022-08-03 PROCEDURE — 85610 PROTHROMBIN TIME: CPT | Performed by: INTERNAL MEDICINE

## 2022-08-03 PROCEDURE — 99232 SBSQ HOSP IP/OBS MODERATE 35: CPT | Performed by: INTERNAL MEDICINE

## 2022-08-03 PROCEDURE — 83735 ASSAY OF MAGNESIUM: CPT | Performed by: INTERNAL MEDICINE

## 2022-08-03 PROCEDURE — 84550 ASSAY OF BLOOD/URIC ACID: CPT | Performed by: INTERNAL MEDICINE

## 2022-08-03 RX ORDER — MAGNESIUM SULFATE HEPTAHYDRATE 40 MG/ML
4 INJECTION, SOLUTION INTRAVENOUS AS NEEDED
Status: DISCONTINUED | OUTPATIENT
Start: 2022-08-03 | End: 2022-08-14 | Stop reason: HOSPADM

## 2022-08-03 RX ORDER — MAGNESIUM SULFATE HEPTAHYDRATE 40 MG/ML
4 INJECTION, SOLUTION INTRAVENOUS AS NEEDED
Status: DISCONTINUED | OUTPATIENT
Start: 2022-08-03 | End: 2022-08-03

## 2022-08-03 RX ORDER — MAGNESIUM SULFATE HEPTAHYDRATE 40 MG/ML
2 INJECTION, SOLUTION INTRAVENOUS AS NEEDED
Status: DISCONTINUED | OUTPATIENT
Start: 2022-08-03 | End: 2022-08-14 | Stop reason: HOSPADM

## 2022-08-03 RX ORDER — MAGNESIUM SULFATE HEPTAHYDRATE 40 MG/ML
2 INJECTION, SOLUTION INTRAVENOUS AS NEEDED
Status: DISCONTINUED | OUTPATIENT
Start: 2022-08-03 | End: 2022-08-03

## 2022-08-03 RX ORDER — MAGNESIUM SULFATE HEPTAHYDRATE 40 MG/ML
2 INJECTION, SOLUTION INTRAVENOUS ONCE
Status: DISCONTINUED | OUTPATIENT
Start: 2022-08-03 | End: 2022-08-03

## 2022-08-03 RX ORDER — POTASSIUM CHLORIDE 750 MG/1
40 TABLET, FILM COATED, EXTENDED RELEASE ORAL AS NEEDED
Status: DISCONTINUED | OUTPATIENT
Start: 2022-08-03 | End: 2022-08-14 | Stop reason: HOSPADM

## 2022-08-03 RX ORDER — POTASSIUM CHLORIDE 7.45 MG/ML
10 INJECTION INTRAVENOUS
Status: DISCONTINUED | OUTPATIENT
Start: 2022-08-03 | End: 2022-08-14 | Stop reason: HOSPADM

## 2022-08-03 RX ORDER — LACTULOSE 10 G/15ML
30 SOLUTION ORAL 3 TIMES DAILY
Status: DISCONTINUED | OUTPATIENT
Start: 2022-08-03 | End: 2022-08-14 | Stop reason: HOSPADM

## 2022-08-03 RX ORDER — HYDROMORPHONE HYDROCHLORIDE 1 MG/ML
0.5 INJECTION, SOLUTION INTRAMUSCULAR; INTRAVENOUS; SUBCUTANEOUS EVERY 4 HOURS PRN
Status: DISCONTINUED | OUTPATIENT
Start: 2022-08-03 | End: 2022-08-04

## 2022-08-03 RX ORDER — POTASSIUM CHLORIDE 1.5 G/1.77G
40 POWDER, FOR SOLUTION ORAL AS NEEDED
Status: DISCONTINUED | OUTPATIENT
Start: 2022-08-03 | End: 2022-08-14 | Stop reason: HOSPADM

## 2022-08-03 RX ADMIN — Medication 10 ML: at 20:56

## 2022-08-03 RX ADMIN — CARVEDILOL 12.5 MG: 12.5 TABLET, FILM COATED ORAL at 08:40

## 2022-08-03 RX ADMIN — Medication 10 ML: at 08:40

## 2022-08-03 RX ADMIN — RIFAXIMIN 550 MG: 550 TABLET ORAL at 08:40

## 2022-08-03 RX ADMIN — LACTULOSE 30 G: 10 SOLUTION ORAL at 08:41

## 2022-08-03 RX ADMIN — CITALOPRAM 20 MG: 20 TABLET, FILM COATED ORAL at 08:40

## 2022-08-03 RX ADMIN — DIAZEPAM 5 MG: 5 INJECTION, SOLUTION INTRAMUSCULAR; INTRAVENOUS at 03:33

## 2022-08-03 RX ADMIN — CARVEDILOL 12.5 MG: 12.5 TABLET, FILM COATED ORAL at 16:53

## 2022-08-03 RX ADMIN — MAGNESIUM SULFATE HEPTAHYDRATE 2 G: 2 INJECTION, SOLUTION INTRAVENOUS at 23:00

## 2022-08-03 RX ADMIN — OXYCODONE 5 MG: 5 TABLET ORAL at 17:29

## 2022-08-03 RX ADMIN — Medication 1 TABLET: at 08:40

## 2022-08-03 RX ADMIN — Medication 220 MG: at 08:41

## 2022-08-03 RX ADMIN — Medication 100 MG: at 08:40

## 2022-08-03 RX ADMIN — OXYCODONE 5 MG: 5 TABLET ORAL at 03:32

## 2022-08-03 RX ADMIN — HYDROMORPHONE HYDROCHLORIDE 0.5 MG: 1 INJECTION, SOLUTION INTRAMUSCULAR; INTRAVENOUS; SUBCUTANEOUS at 21:05

## 2022-08-03 RX ADMIN — ALLOPURINOL 100 MG: 100 TABLET ORAL at 08:40

## 2022-08-03 RX ADMIN — OXYCODONE 5 MG: 5 TABLET ORAL at 08:41

## 2022-08-03 RX ADMIN — LACTULOSE 30 G: 10 SOLUTION ORAL at 20:56

## 2022-08-03 RX ADMIN — PANTOPRAZOLE SODIUM 40 MG: 40 TABLET, DELAYED RELEASE ORAL at 05:05

## 2022-08-03 RX ADMIN — LACTULOSE 30 G: 10 SOLUTION ORAL at 16:54

## 2022-08-03 RX ADMIN — RIFAXIMIN 550 MG: 550 TABLET ORAL at 20:56

## 2022-08-03 RX ADMIN — Medication 1 MG: at 08:40

## 2022-08-03 RX ADMIN — DIAZEPAM 10 MG: 5 INJECTION, SOLUTION INTRAMUSCULAR; INTRAVENOUS at 05:04

## 2022-08-03 NOTE — PROGRESS NOTES
Name: Macario Carpio ADMIT: 2022   : 1970  PCP: Andrew Grayson, APRN    MRN: [id number] LOS: 3 days   AGE/SEX: 51 y.o. male  ROOM: [facility]   Subjective   Chief Complaint   Patient presents with   • Altered Mental Status      Was resting when I entered the room.  Awoke easily.  He was grimacing and reported most of his pain was from the gout locations.  He also is reporting abdominal pain.  When I palpated he grimaced and stated that right upper quadrant pain was present.  He is not reporting any nausea or vomiting.  He does report reflux symptoms as well.  No diarrhea.  No chest pain palpitations or shortness of breath.    Objective   Vital Signs  Temp:  [97.6 °F (36.4 °C)-98.5 °F (36.9 °C)] 98.4 °F (36.9 °C)  Heart Rate:  [55-63] 61  Resp:  [14-20] 20  BP: (125-156)/(76-91) 125/76  SpO2:  [95 %-100 %] 95 %  on   ;   Device (Oxygen Therapy): room air  Body mass index is 27.7 kg/m².    Physical Exam  Vitals and nursing note reviewed.   Constitutional:       Appearance: He is ill-appearing (chronically). He is not diaphoretic.   HENT:      Head: Normocephalic and atraumatic.   Eyes:      General:         Right eye: No discharge.         Left eye: No discharge.      Conjunctiva/sclera: Conjunctivae normal.   Cardiovascular:      Rate and Rhythm: Normal rate and regular rhythm.      Pulses: Normal pulses.   Pulmonary:      Effort: Pulmonary effort is normal.      Breath sounds: Decreased breath sounds present. No wheezing.   Abdominal:      General: There is no distension.      Palpations: Abdomen is soft.      Tenderness: There is abdominal tenderness. There is no guarding or rebound.   Musculoskeletal:         General: Swelling, tenderness and deformity present.      Cervical back: Neck supple. No tenderness.      Right lower leg: No edema.      Left lower leg: No edema.      Comments: Tenderness over joint of both hand and left elbow, tophi present diffusely. Scabbed wound right foot and prior  great toe amputation now dressed.   Skin:     General: Skin is warm and dry.   Neurological:      Mental Status: He is alert. He is disoriented.   Psychiatric:         Mood and Affect: Mood is anxious.         Behavior: Behavior normal.         Results Review:       I reviewed the patient's new clinical results.  I reviewed imaging, agree with interpretation.  I reviewed telemetry, sinus .        Results from last 7 days   Lab Units 08/03/22 0707 08/02/22 0637 08/01/22 1000 07/31/22  1632   WBC 10*3/mm3 5.58 4.42 5.59 6.14   HEMOGLOBIN g/dL 10.2* 10.1* 11.2* 13.3   PLATELETS 10*3/mm3 86* 90* 108* 161     Results from last 7 days   Lab Units 08/03/22 0707 08/02/22 0637 08/01/22 1000 07/31/22  1736   SODIUM mmol/L 137 138 141 142   POTASSIUM mmol/L 4.0 3.7 4.1 4.6   CHLORIDE mmol/L 109* 111* 111* 108*   CO2 mmol/L 16.0* 14.7* 14.7* 15.2*   BUN mg/dL 14 22* 30* 29*   CREATININE mg/dL 1.00 1.64* 2.45* 2.79*   GLUCOSE mg/dL 86 90 95 85   Estimated Creatinine Clearance: 111.4 mL/min (by C-G formula based on SCr of 1 mg/dL).  Results from last 7 days   Lab Units 08/03/22 0707 08/02/22 0637 08/01/22  1000 07/31/22  1736 07/31/22  1632   CALCIUM mg/dL 8.2* 8.4* 9.2 9.5  --    ALBUMIN g/dL 3.20* 3.00* 3.30* 3.90  --    MAGNESIUM mg/dL 1.1* 1.7 2.4  --  1.2*   PHOSPHORUS mg/dL 2.7 3.6  --   --   --      Results from last 7 days   Lab Units 08/03/22 0707 08/02/22 0637 08/01/22  1000   INR  1.27* 1.30* 1.28*        allopurinol, 100 mg, Oral, Daily  cadexomer iodine, 1 application, Topical, Once per day on Mon Thu  carvedilol, 12.5 mg, Oral, BID With Meals  citalopram, 20 mg, Oral, Daily  multivitamin, 1 tablet, Oral, Daily   And  folic acid, 1 mg, Oral, Daily  lactulose, 30 g, Oral, TID  pantoprazole, 40 mg, Oral, QAM  rifAXIMin, 550 mg, Oral, Q12H  sodium chloride, 10 mL, Intravenous, Q12H  thiamine, 100 mg, Oral, Daily  zinc sulfate, 220 mg, Oral, Daily       NPO Diet NPO Type: Strict NPO    Assessment & Plan       Active Hospital Problems    Diagnosis  POA   • **Hepatic encephalopathy (HCC) [K72.90]  Yes   • 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       · Hepatic encephalopathy/alcoholic cirrhosis: Continue lactulose and Xifaxan.  Planned EGD eventually.  GI following.  · Gallstone: Noted incidentally on CT.  He has been more awake today and is reporting right upper quadrant tenderness.  Will check ultrasound and ask surgery to review.  · YANNI on CKD: Improving.  Nephrology evaluated.  · Alcohol abuse/HCV: See above.  Benzodiazepine if needed.  Vitamin supplementation.  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  Continue allopurinol.  Alcohol abstinence will be important in controlling this disease progression.  · Low magnesium secondary to alcohol abuse: Increased replacement.  · Hypertension: Acceptable acutely.  · Chronic wound right foot: Dressed.  Wound care following.  · Prophylaxis: SCD.  He is having some worsening of thrombocytopenia so do not want to start chemical prophylaxis at this time.  · Disposition: TBD    Seferino Van MD  [facility]  08/03/22  14:24 EDT    Dictated portions using Dragon dictation software.    During the entire encounter, I was wearing recommended PPE including face mask and eye protection. Hand sanitization was performed prior to entering room and upon exit.

## 2022-08-03 NOTE — PLAN OF CARE
Goal Outcome Evaluation:              Outcome Evaluation: Pt is alert to self place and situation, still gives unclear answers on time. Abd pain treated with PRN meds. CIWA 6-10, pt agitation subsided after speaking with access.  Lactulose given, loose BM's associated. IV fluids d/c.Gen surg consulted. Awaiting US of gallbladder. Nephrology signed off. Will continue to monitor.

## 2022-08-03 NOTE — CONSULTS
"  HPI: Patient is a 51-year-old male evaluated by Access due to alcohol use.  Patient arrived to ED via EMS due to AMS on 7/31.  He stated his last drink was 5 days prior at that time.  Upon entering room, patient is resting in bed but irritable.  When asked about his mood, he states \"I am irritated they let me pee on myself and I have not done that since I was a kid.\"  Patient states he drinks an average of 2 beers per day.  When asking patient to clarify due to medical diagnoses, he acknowledges he was drinking 10-12 beers per day about a year ago and had been \"his whole life.\"  He states his drinking began socially and does not cite a trigger for exacerbated drinking.  When asked if he believes he has a problem, he states \"no I do not.\"  He states \"when I drink it makes me open up and feel vulnerable.\"  He later states \"I have to stop.  I'm not gonna raise my kids without a father.\"  Patient has made some bizarre comments but lucidity much improved throughout the day today.  When asked if he has previously went through withdrawal, he acknowledges \"I have had DTs\" and cites he experienced hallucinations he can barely remember but he cannot identify.  He has no periods of sobriety.  He denies IP for mental health or chemical dependency purposes, but has previously been involved in AA several years ago.  He did not have a sponsor and did not feel this program was beneficial for him.  Denies drug use despite UDS positive for THC and oxycodone.  He states \"no I do not like that stuff or the way it makes me feel.\"    Patient denies depression.  When asked about anxiety, patient states \"I want to just go home.\"  He denies sleep disturbance, but states his appetite has \"gone down.\"  Denies hallucinations other than when experiencing DTs.  Denies SI, HI, SIB currently or previously.  Denies family history of mental health conditions or substance abuse.    PSYCHOSOCIAL ASSESSMENT: Patient's affect is flat.  He makes little " "eye contact throughout assessment however he makes his sense of humor known.  He is a bit unkempt and unshaven.  Thought content irrelevant at times, processes mostly linear.  He is alert and oriented in all realms except to date.    SOCIAL HISTORY: Patient resides in his home with his wife and one of his 3 adult children.  He has a college degree and worked as a  until 7 or 8 years ago.  Denies problematic behavior at work.  Patient also served in the [employer] for 6 years in the 80s.  [employer] preference.  Enjoys riding horses.  Denies history of abuse or trauma and acknowledges feeling safe at home.  Patient states he enjoys others and being social, \"I fly to [facility] and eat lunch with my mandeep.  Sometimes we fly to [place].\"    PLAN: Patient is interested in recovery resources which are provided at this time.  He voices interest in support groups other than AA which are highlighted and discussed with patient.  He also voices some interest in going to the [facility] upon discharge, stating he had a cousin that also went there for treatment and was beneficial.  This information is also provided.  Discussed IOP which patient may also be open to.  Towards end of interview patient becomes tired and nods off occasionally.  Discussed resources would be discussed further with him at follow-up tomorrow.  Access will continue to follow.  "

## 2022-08-03 NOTE — PROGRESS NOTES
[facility] Progress Note      Patient Name: Macario Carpio  : 1970  MRN: [id number]  Primary Care Physician:  Andrew Grayson, CALLUM  Date of admission: 2022    Subjective     Interval History:   Follow up YANNI.  Tells me he has to urinate urgently. Urinated in brief.  Abdominal pain this am. CT with abnormal gallbladder . US ordered. uop 1300 .  Ate most of lunch .  Review of Systems:   As noted above    Objective     Vitals:   Temp:  [97.6 °F (36.4 °C)-98.5 °F (36.9 °C)] 97.6 °F (36.4 °C)  Heart Rate:  [55-63] 61  Resp:  [14-18] 18  BP: (139-156)/(86-91) 145/86    Intake/Output Summary (Last 24 hours) at 8/3/2022 1327  Last data filed at 8/3/2022 0900  Gross per 24 hour   Intake --   Output 1325 ml   Net -1325 ml       Physical Exam:    General Appearance:Sleeping very soundly.  Awakens and answers questions slowly .Speech better.   Skin: warm and dry  HEENT: oral mucosa normal, nonicteric sclera  Neck: supple, no JVD  Lungs: Clear to auscultation   Heart: RRR, normal S1 and S2  Abdomen: soft, nontender, nondistended. + bs  Extremities: no edema. Multiple tophi elbows, hands, feet.   Neuro: slow speech. Oriented to self and city .    Scheduled Meds:     allopurinol, 100 mg, Oral, Daily  cadexomer iodine, 1 application, Topical, Once per day on   carvedilol, 12.5 mg, Oral, BID With Meals  citalopram, 20 mg, Oral, Daily  multivitamin, 1 tablet, Oral, Daily   And  folic acid, 1 mg, Oral, Daily  lactulose, 30 g, Oral, TID  pantoprazole, 40 mg, Oral, QAM  rifAXIMin, 550 mg, Oral, Q12H  sodium chloride, 10 mL, Intravenous, Q12H  thiamine, 100 mg, Oral, Daily  zinc sulfate, 220 mg, Oral, Daily      IV Meds:        Results Reviewed:   I have personally reviewed the results from the time of this admission to 8/3/2022 13:27 EDT     Results from last 7 days   Lab Units 22  0707 22  0637 22  1000 22  1736   SODIUM mmol/L 137 138 141 142   POTASSIUM  mmol/L 4.0 3.7 4.1 4.6   CHLORIDE mmol/L 109* 111* 111* 108*   CO2 mmol/L 16.0* 14.7* 14.7* 15.2*   BUN mg/dL 14 22* 30* 29*   CREATININE mg/dL 1.00 1.64* 2.45* 2.79*   CALCIUM mg/dL 8.2* 8.4* 9.2 9.5   BILIRUBIN mg/dL  --  0.6 0.8 1.5*   ALK PHOS U/L  --  75 77 87   ALT (SGPT) U/L  --  10 10 9   AST (SGOT) U/L  --  27 28 36   GLUCOSE mg/dL 86 90 95 85       Estimated Creatinine Clearance: 111.4 mL/min (by C-G formula based on SCr of 1 mg/dL).    Results from last 7 days   Lab Units 08/03/22  0707 08/02/22  0637 08/01/22  1000   MAGNESIUM mg/dL 1.1* 1.7 2.4   PHOSPHORUS mg/dL 2.7 3.6  --        Results from last 7 days   Lab Units 08/03/22  0707 08/02/22  0637 08/01/22  1000   URIC ACID mg/dL 7.5* 8.4* 9.1*       Results from last 7 days   Lab Units 08/03/22  0707 08/02/22  0637 08/01/22  1000 07/31/22  1632   WBC 10*3/mm3 5.58 4.42 5.59 6.14   HEMOGLOBIN g/dL 10.2* 10.1* 11.2* 13.3   PLATELETS 10*3/mm3 86* 90* 108* 161       Results from last 7 days   Lab Units 08/03/22  0707 08/02/22  0637 08/01/22  1000 07/31/22  1632   INR  1.27* 1.30* 1.28* 1.27*       Assessment / Plan     ASSESSMENT:  1. YANNI, multifactorial: hypovolemia, NSAID use, ARB+ diuretic .  Creatinine back to normal .Chronic respiratory alkalosis due to liver disease.  Confirmed by ABG.   2. ETOH abuse, cirrhosis .  3. Encephalopathy with elevated ammonia .  4. Hep C  5. Substance abuse .  6. Tophaceous gout .  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  8. HTN. On coreg and norvasc at home.  Coreg here .  9. Abdominal pain with abnormal GB on CT. US ordered .  PLAN:  1. DC IVF.  2. Will sign off. Please call with questions .  Robyn Werner MD  08/03/22  13:27 EDT    [facility]  [phone]

## 2022-08-03 NOTE — NURSING NOTE
Chart reveals patient continues to be inappropriate for initial evaluation as he remains alert to self only. Access will re-attempt interview at a later time.

## 2022-08-03 NOTE — PROGRESS NOTES
[facility]  Inpatient Progress Note    Reason for Follow Up: Cirrhosis, encephalopathy    Subjective     Interval History:   Agitated.  Complains of abdominal pain.  No overnight events per nursing.  Had been sleeping soundly prior to my visit per nursing    Current Facility-Administered Medications:   •  acetaminophen (TYLENOL) tablet 650 mg, 650 mg, Oral, Q4H PRN **OR** acetaminophen (TYLENOL) 160 MG/5ML solution 650 mg, 650 mg, Oral, Q4H PRN **OR** acetaminophen (TYLENOL) suppository 650 mg, 650 mg, Rectal, Q4H PRN, Foreign Wilkins MD  •  allopurinol (ZYLOPRIM) tablet 100 mg, 100 mg, Oral, Daily, Foreign Wilkins MD, 100 mg at 08/02/22 0825  •  cadexomer iodine (IODOSORB) 0.9 % gel 1 application, 1 application, Topical, Once per day on Mon Thu, Seferino Van MD, 1 application at 08/01/22 1619  •  carvedilol (COREG) tablet 12.5 mg, 12.5 mg, Oral, BID With Meals, Foreign Wilkins MD, 12.5 mg at 08/02/22 1849  •  citalopram (CeleXA) tablet 20 mg, 20 mg, Oral, Daily, Foreign Wilkins MD, 20 mg at 08/02/22 0825  •  LORazepam (ATIVAN) tablet 1 mg, 1 mg, Oral, Q2H PRN, 1 mg at 08/02/22 0216 **OR** diazePAM (VALIUM) injection 5 mg, 5 mg, Intravenous, Q2H PRN, 5 mg at 08/03/22 0333 **OR** LORazepam (ATIVAN) tablet 2 mg, 2 mg, Oral, Q1H PRN **OR** diazePAM (VALIUM) injection 10 mg, 10 mg, Intravenous, Q1H PRN, 10 mg at 08/02/22 2102 **OR** diazePAM (VALIUM) injection 10 mg, 10 mg, Intravenous, Q30 Min PRN, Foreign Wilkins MD, 10 mg at 08/03/22 0504  •  multivitamin (THERAGRAN) tablet 1 tablet, 1 tablet, Oral, Daily, 1 tablet at 08/02/22 0825 **AND** folic acid (FOLVITE) tablet 1 mg, 1 mg, Oral, Daily, Seferino Van MD, 1 mg at 08/02/22 0829  •  lactulose (CHRONULAC) 10 GM/15ML solution 30 g, 30 g, Oral, TID, Foreign Wilkins MD, 30 g at 08/02/22 2001  •  nitroglycerin (NITROSTAT) SL tablet 0.4 mg, 0.4 mg, Sublingual, Q5 Min PRN, Foreign Wilkins MD  •   ondansetron (ZOFRAN) tablet 4 mg, 4 mg, Oral, Q6H PRN, 4 mg at 08/02/22 0316 **OR** ondansetron (ZOFRAN) injection 4 mg, 4 mg, Intravenous, Q6H PRN, Foreign Wilkins MD, 4 mg at 08/01/22 0345  •  oxyCODONE (ROXICODONE) immediate release tablet 5 mg, 5 mg, Oral, Q4H PRN, Seferino Van MD, 5 mg at 08/03/22 0332  •  pantoprazole (PROTONIX) EC tablet 40 mg, 40 mg, Oral, QAM, Foreign Wilkins MD, 40 mg at 08/03/22 0505  •  riFAXIMin (XIFAXAN) tablet 550 mg, 550 mg, Oral, Q12H, Foreign Wilkins MD, 550 mg at 08/02/22 2001  •  sodium chloride 0.45 % infusion, 100 mL/hr, Intravenous, Continuous, Seferino Cid MD, Last Rate: 100 mL/hr at 08/02/22 2348, 100 mL/hr at 08/02/22 2348  •  [COMPLETED] Insert peripheral IV, , , Once **AND** sodium chloride 0.9 % flush 10 mL, 10 mL, Intravenous, PRN, Tomasz Robles MD  •  sodium chloride 0.9 % flush 10 mL, 10 mL, Intravenous, Q12H, Foreign Wilkins MD, 10 mL at 08/02/22 2001  •  sodium chloride 0.9 % flush 10 mL, 10 mL, Intravenous, PRN, Foreign Wilkins MD  •  thiamine (VITAMIN B-1) tablet 100 mg, 100 mg, Oral, Daily, Seferino Van MD, 100 mg at 08/02/22 0825  •  zinc sulfate (ZINCATE) capsule 220 mg, 220 mg, Oral, Daily, Foreign Wilkins MD, 220 mg at 08/02/22 0825  Review of Systems:    Positive for abdominal pain, negative for fevers or chills, negative for nausea or vomiting    Objective     Vital Signs  Temp:  [97.6 °F (36.4 °C)-98.5 °F (36.9 °C)] 97.6 °F (36.4 °C)  Heart Rate:  [55-63] 61  Resp:  [14-18] 18  BP: (118-156)/(75-91) 145/86  Body mass index is 27.7 kg/m².    Intake/Output Summary (Last 24 hours) at 8/3/2022 0821  Last data filed at 8/3/2022 0600  Gross per 24 hour   Intake 360 ml   Output 1325 ml   Net -965 ml     No intake/output data recorded.     Physical Exam:   General: patient awake, alert, agitated   Eyes: Normal lids and lashes, no scleral icterus   Neck: supple, normal ROM   Skin: warm and dry, not  jaundiced   Cardiovascular: regular rhythm and rate, no murmurs auscultated   Pulm: clear to auscultation bilaterally, regular and unlabored   Abdomen: soft, tender in the upper abdomen with some guarding but no rebound, nondistended; normal bowel sounds   Extremities: no rash or edema   Psychiatric: Agitated, somewhat confused     Results Review:     I reviewed the patient's new clinical results.    Results from last 7 days   Lab Units 08/03/22  0707 08/02/22  0637 08/01/22  1000   WBC 10*3/mm3 5.58 4.42 5.59   HEMOGLOBIN g/dL 10.2* 10.1* 11.2*   HEMATOCRIT % 29.9* 29.0* 32.6*   PLATELETS 10*3/mm3 86* 90* 108*     Results from last 7 days   Lab Units 08/03/22  0707 08/02/22  0637 08/01/22  1000 07/31/22  1736   SODIUM mmol/L 137 138 141 142   POTASSIUM mmol/L 4.0 3.7 4.1 4.6   CHLORIDE mmol/L 109* 111* 111* 108*   CO2 mmol/L 16.0* 14.7* 14.7* 15.2*   BUN mg/dL 14 22* 30* 29*   CREATININE mg/dL 1.00 1.64* 2.45* 2.79*   CALCIUM mg/dL 8.2* 8.4* 9.2 9.5   BILIRUBIN mg/dL  --  0.6 0.8 1.5*   ALK PHOS U/L  --  75 77 87   ALT (SGPT) U/L  --  10 10 9   AST (SGOT) U/L  --  27 28 36   GLUCOSE mg/dL 86 90 95 85     Results from last 7 days   Lab Units 08/03/22  0707 08/02/22  0637 08/01/22  1000   INR  1.27* 1.30* 1.28*     No results found for: LIPASE    Radiology:  CT Head Without Contrast   Final Result   No acute process identified. Further evaluation could be   performed with an MRI examination of the brain as indicated.       Radiation dose reduction techniques were utilized, including automated   exposure control and exposure modulation based on body size.       This report was finalized on 8/1/2022 7:10 AM by Dr. Juan Boswell M.D.          CT Abdomen Pelvis Without Contrast   Final Result         Electronically signed by Donal Vigil MD on 07-31-22 at 1923      XR Chest 1 View   Final Result          Assessment & Plan     Patient Active Problem List   Diagnosis   • Iron deficiency anemia   • Hepatic cirrhosis  (HCC)   • Screening for malignant neoplasm of colon   • Abdominal aortic aneurysm (AAA) (HCC)   • Hepatitis C virus infection   • Gout   • Hypercholesterolemia   • Hepatic cirrhosis (HCC)   • Thoracic aortic aneurysm without rupture (HCC)   • Hypertensive disorder   • Hip pain   • Chronic pain   • Bicuspid aortic valve   • Arthritis of right hip   • Aortic valve regurgitation   • Anxiety   • Abdominal aortic aneurysm (AAA) (HCC)   • Hepatic cirrhosis (HCC)   • Hepatitis C virus infection   • Chronic pain   • Hepatic encephalopathy (HCC)   • Alcoholic cirrhosis of liver without ascites (HCC)   • YANNI (acute kidney injury) (HCC)   • Hypomagnesemia   • Tophaceous gout     Problems are new to me today  Assessment:  1. Hepatic encephalopathy, withdrawal may be contributing to confusion  2. Cirrhosis-history of alcohol abuse, hep C  3. Anemia-stable, no active bleeding  4. YANNI-resolved      Plan:  · Continue lactulose and Xifaxan-titrate to 3-4 soft stools daily  · Hemoglobin stable-no evidence of bleeding  · Recommend EGD prior to discharge to assess for varices  · Given acute onset abdominal pain and significant tenderness on exam, will proceed with CT of abdomen pelvis for further evaluation today-this has been ordered stat and discussed with nursing    I discussed the patients findings and my recommendations with patient and nursing staff.    Chantell Padilla MD

## 2022-08-03 NOTE — PLAN OF CARE
Goal Outcome Evaluation:           Progress: no change  Outcome Evaluation: pt still only alert to self and place.  Pt still impulsive, yelling out throughout the night.  CIWA score between 8-16.  Lactulose given, NS @ 100ml/hr.  Pt incontient at times, pain controlled with po pain medication

## 2022-08-04 LAB
ALBUMIN SERPL-MCNC: 2.9 G/DL (ref 3.5–5.2)
ALBUMIN/GLOB SERPL: 0.9 G/DL
ALP SERPL-CCNC: 96 U/L (ref 39–117)
ALT SERPL W P-5'-P-CCNC: 15 U/L (ref 1–41)
ANION GAP SERPL CALCULATED.3IONS-SCNC: 8.3 MMOL/L (ref 5–15)
AST SERPL-CCNC: 42 U/L (ref 1–40)
BILIRUB SERPL-MCNC: 1 MG/DL (ref 0–1.2)
BUN SERPL-MCNC: 10 MG/DL (ref 6–20)
BUN/CREAT SERPL: 14.5 (ref 7–25)
CALCIUM SPEC-SCNC: 8.5 MG/DL (ref 8.6–10.5)
CHLORIDE SERPL-SCNC: 102 MMOL/L (ref 98–107)
CO2 SERPL-SCNC: 16.7 MMOL/L (ref 22–29)
CREAT SERPL-MCNC: 0.69 MG/DL (ref 0.76–1.27)
DEPRECATED RDW RBC AUTO: 46.9 FL (ref 37–54)
EGFRCR SERPLBLD CKD-EPI 2021: 112 ML/MIN/1.73
ERYTHROCYTE [DISTWIDTH] IN BLOOD BY AUTOMATED COUNT: 14.5 % (ref 12.3–15.4)
GLOBULIN UR ELPH-MCNC: 3.4 GM/DL
GLUCOSE SERPL-MCNC: 87 MG/DL (ref 65–99)
HCT VFR BLD AUTO: 28.6 % (ref 37.5–51)
HGB BLD-MCNC: 9.9 G/DL (ref 13–17.7)
LIPASE SERPL-CCNC: 609 U/L (ref 13–60)
MAGNESIUM SERPL-MCNC: 2 MG/DL (ref 1.6–2.6)
MCH RBC QN AUTO: 31.1 PG (ref 26.6–33)
MCHC RBC AUTO-ENTMCNC: 34.6 G/DL (ref 31.5–35.7)
MCV RBC AUTO: 89.9 FL (ref 79–97)
PHOSPHATE SERPL-MCNC: 2.3 MG/DL (ref 2.5–4.5)
PLATELET # BLD AUTO: 82 10*3/MM3 (ref 140–450)
PMV BLD AUTO: 11 FL (ref 6–12)
POTASSIUM SERPL-SCNC: 3.8 MMOL/L (ref 3.5–5.2)
PROT SERPL-MCNC: 6.3 G/DL (ref 6–8.5)
RBC # BLD AUTO: 3.18 10*6/MM3 (ref 4.14–5.8)
SODIUM SERPL-SCNC: 127 MMOL/L (ref 136–145)
URATE SERPL-MCNC: 6.9 MG/DL (ref 3.4–7)
WBC NRBC COR # BLD: 5.79 10*3/MM3 (ref 3.4–10.8)

## 2022-08-04 PROCEDURE — 80053 COMPREHEN METABOLIC PANEL: CPT | Performed by: INTERNAL MEDICINE

## 2022-08-04 PROCEDURE — 83690 ASSAY OF LIPASE: CPT | Performed by: SURGERY

## 2022-08-04 PROCEDURE — 84100 ASSAY OF PHOSPHORUS: CPT | Performed by: INTERNAL MEDICINE

## 2022-08-04 PROCEDURE — 25010000002 HYDROMORPHONE PER 4 MG: Performed by: INTERNAL MEDICINE

## 2022-08-04 PROCEDURE — 84550 ASSAY OF BLOOD/URIC ACID: CPT | Performed by: INTERNAL MEDICINE

## 2022-08-04 PROCEDURE — 99232 SBSQ HOSP IP/OBS MODERATE 35: CPT | Performed by: PHYSICIAN ASSISTANT

## 2022-08-04 PROCEDURE — 85027 COMPLETE CBC AUTOMATED: CPT | Performed by: INTERNAL MEDICINE

## 2022-08-04 PROCEDURE — 83735 ASSAY OF MAGNESIUM: CPT | Performed by: INTERNAL MEDICINE

## 2022-08-04 PROCEDURE — 99222 1ST HOSP IP/OBS MODERATE 55: CPT | Performed by: SURGERY

## 2022-08-04 PROCEDURE — 25010000002 MAGNESIUM SULFATE 2 GM/50ML SOLUTION: Performed by: NURSE PRACTITIONER

## 2022-08-04 RX ORDER — OXYCODONE HYDROCHLORIDE 5 MG/1
10 TABLET ORAL EVERY 4 HOURS PRN
Status: DISCONTINUED | OUTPATIENT
Start: 2022-08-04 | End: 2022-08-06

## 2022-08-04 RX ORDER — OXYCODONE HYDROCHLORIDE 5 MG/1
5 TABLET ORAL EVERY 4 HOURS PRN
Status: DISCONTINUED | OUTPATIENT
Start: 2022-08-04 | End: 2022-08-06

## 2022-08-04 RX ORDER — SODIUM CHLORIDE 9 MG/ML
100 INJECTION, SOLUTION INTRAVENOUS CONTINUOUS
Status: ACTIVE | OUTPATIENT
Start: 2022-08-04 | End: 2022-08-05

## 2022-08-04 RX ORDER — HYDROMORPHONE HYDROCHLORIDE 1 MG/ML
0.5 INJECTION, SOLUTION INTRAMUSCULAR; INTRAVENOUS; SUBCUTANEOUS EVERY 4 HOURS PRN
Status: DISCONTINUED | OUTPATIENT
Start: 2022-08-04 | End: 2022-08-05

## 2022-08-04 RX ORDER — OXYCODONE HYDROCHLORIDE 5 MG/1
10 TABLET ORAL EVERY 6 HOURS PRN
Status: DISCONTINUED | OUTPATIENT
Start: 2022-08-04 | End: 2022-08-04

## 2022-08-04 RX ORDER — OXYCODONE HYDROCHLORIDE 5 MG/1
5 TABLET ORAL EVERY 6 HOURS PRN
Status: DISCONTINUED | OUTPATIENT
Start: 2022-08-04 | End: 2022-08-04

## 2022-08-04 RX ADMIN — SODIUM CHLORIDE 100 ML/HR: 9 INJECTION, SOLUTION INTRAVENOUS at 17:12

## 2022-08-04 RX ADMIN — RIFAXIMIN 550 MG: 550 TABLET ORAL at 08:58

## 2022-08-04 RX ADMIN — CARVEDILOL 12.5 MG: 12.5 TABLET, FILM COATED ORAL at 17:12

## 2022-08-04 RX ADMIN — Medication 10 ML: at 20:03

## 2022-08-04 RX ADMIN — LACTULOSE 30 G: 10 SOLUTION ORAL at 20:02

## 2022-08-04 RX ADMIN — MAGNESIUM SULFATE HEPTAHYDRATE 2 G: 2 INJECTION, SOLUTION INTRAVENOUS at 01:00

## 2022-08-04 RX ADMIN — PANTOPRAZOLE SODIUM 40 MG: 40 TABLET, DELAYED RELEASE ORAL at 06:04

## 2022-08-04 RX ADMIN — MAGNESIUM SULFATE HEPTAHYDRATE 2 G: 2 INJECTION, SOLUTION INTRAVENOUS at 03:29

## 2022-08-04 RX ADMIN — ALLOPURINOL 100 MG: 100 TABLET ORAL at 08:58

## 2022-08-04 RX ADMIN — HYDROMORPHONE HYDROCHLORIDE 0.5 MG: 1 INJECTION, SOLUTION INTRAMUSCULAR; INTRAVENOUS; SUBCUTANEOUS at 08:58

## 2022-08-04 RX ADMIN — OXYCODONE 5 MG: 5 TABLET ORAL at 05:56

## 2022-08-04 RX ADMIN — Medication 2 PACKET: at 17:12

## 2022-08-04 RX ADMIN — Medication 220 MG: at 08:59

## 2022-08-04 RX ADMIN — OXYCODONE 5 MG: 5 TABLET ORAL at 17:12

## 2022-08-04 RX ADMIN — LACTULOSE 30 G: 10 SOLUTION ORAL at 08:58

## 2022-08-04 RX ADMIN — Medication 100 MG: at 08:58

## 2022-08-04 RX ADMIN — CITALOPRAM 20 MG: 20 TABLET, FILM COATED ORAL at 08:58

## 2022-08-04 RX ADMIN — Medication 1 TABLET: at 08:58

## 2022-08-04 RX ADMIN — OXYCODONE 5 MG: 5 TABLET ORAL at 01:00

## 2022-08-04 RX ADMIN — Medication 10 ML: at 08:59

## 2022-08-04 RX ADMIN — RIFAXIMIN 550 MG: 550 TABLET ORAL at 20:03

## 2022-08-04 RX ADMIN — Medication 1 MG: at 08:58

## 2022-08-04 RX ADMIN — CARVEDILOL 12.5 MG: 12.5 TABLET, FILM COATED ORAL at 08:58

## 2022-08-04 RX ADMIN — Medication 1 APPLICATION: at 08:59

## 2022-08-04 RX ADMIN — OXYCODONE 5 MG: 5 TABLET ORAL at 21:27

## 2022-08-04 NOTE — CONSULTS
[facility]   Consult Note    Patient Name: Macario Carpio  : 1970  MRN: [id number]  Primary Care Physician:  Andrew Grayson APRN  Referring Physician: Foreign Wilkins MD  Date of admission: 2022    Inpatient General Surgery Consult  Consult performed by: Casey Avendano Jr., MD  Consult ordered by: Seferino Van MD        Subjective   Subjective     Reason for Consult/ Chief Complaint: Cholelithiasis    History of Present Illness  Macario Carpio is a 51 y.o. male who has a history of alcohol abuse and cirrhosis.  He presented to the emergency room on 2022 with altered mental status and was admitted with hepatic encephalopathy.    He has been complaining of abdominal pain and previously indicated that it was in the right upper quadrant.  A CT scan of the abdomen and pelvis performed at the time of admission showed incidental gallstones.  A right upper quadrant ultrasound confirmed gallstones which showed no evidence of cholecystitis.    The patient states today that his primary pain is in the back and he thought that he was having kidney problems.  When he eats the pain intensifies and will radiate to the abdomen.  He frequently has nausea and vomiting and states that he has been vomiting well being in the hospital.    Review of Systems   Respiratory: Negative for chest tightness and shortness of breath.    Cardiovascular: Negative for chest pain and palpitations.   Gastrointestinal: Positive for abdominal pain, nausea and vomiting. Negative for abdominal distention, constipation and diarrhea.   Psychiatric/Behavioral: Positive for confusion. Negative for agitation.        Personal History     Past Medical History:   Diagnosis Date   • Gout    • Hepatitis C        Past Surgical History:   Procedure Laterality Date   • TOE AMPUTATION     • TOTAL HIP ARTHROPLASTY Right 2020       Family History: family history includes Diabetes in his maternal grandfather; Heart disease in his  father and mother. Otherwise pertinent FHx was reviewed and not pertinent to current issue.    Social History:  reports that he has never smoked. He has never used smokeless tobacco. He reports that he does not drink alcohol and does not use drugs.    Home Medications:   Menthol (Topical Analgesic), Transfer Bench, allopurinol, amLODIPine, amantadine, atorvastatin, carvedilol, celecoxib, citalopram, colchicine, folic acid, furosemide, lactulose, losartan, meloxicam, methylPREDNISolone, mupirocin, oxyCODONE, pantoprazole, riFAXIMin, spironolactone, thiamine, and zinc gluconate    Allergies:  Allergies   Allergen Reactions   • Indomethacin Other (See Comments)     Lips sting and lightheadedness       Objective    Objective     Vitals:  Temp:  [98.3 °F (36.8 °C)-98.7 °F (37.1 °C)] 98.5 °F (36.9 °C)  Heart Rate:  [61-70] 68  Resp:  [16-20] 16  BP: (124-144)/(76-88) 140/88    Physical Exam  Constitutional:       Appearance: He is ill-appearing. He is not toxic-appearing.   Abdominal:      Palpations: Abdomen is soft.      Tenderness: There is abdominal tenderness in the epigastric area.      Comments: The abdomen is soft and diffusely tender throughout the epigastrium without localization.   Neurological:      Mental Status: He is alert.   Psychiatric:         Behavior: Behavior is cooperative.         Result Review    Result Review:  I have personally reviewed the results from the time of this admission to 8/4/2022 12:39 EDT and agree with these findings:  [x]  Laboratory list / accordion  []  Microbiology  [x]  Radiology  []  EKG/Telemetry   []  Cardiology/Vascular   []  Pathology  [x]  Old records  []  Other:      Assessment & Plan   Assessment / Plan     Brief Patient Summary:  Macario Carpio is a 51 y.o. male who has a history of alcohol abuse and cirrhosis.  He has been having back pain with radiation to the epigastrium associated with nausea and vomiting.  A right upper quadrant ultrasound shows gallstones but  no cholecystitis.    1.  Abdominal pain: The patient has abdominal pain is located in the epigastrium and presents primarily has back pain with radiation to the abdomen.  It is often postprandial and associated with nausea and vomiting.  His symptoms are highly suspicious for underlying pancreatitis.  His alcohol history places him at risk of chronic pancreatitis.  I doubt his abdominal pain is related to the gallstones.  I will check a lipase but this may be normal in the setting of chronic pancreatitis.  There are no plans to proceed with a cholecystectomy at this time.        Casey Avendano Jr., MD

## 2022-08-04 NOTE — NURSING NOTE
[facility] follow-up.  Patient sleeping, wakens briefly to voice but quickly falls back to sleep and does not answer questions.  Per RN, he was medicated with dilaudid earlier.  CIWA scores have been low, VSS.  Unable to discuss KIKE tx further at this time; resources were seen at bedside.    AC following.

## 2022-08-04 NOTE — NURSING NOTE
08/04/22 1010   Wound 08/01/22 0700 Right medial foot   Placement Date/Time: 08/01/22 0700   Present on Hospital Admission: Yes  Side: Right  Orientation: medial  Location: foot   Dressing Appearance intact;dry  (kerlix, ace)   Base moist;pink;granulating   Periwound   (crusted)   Edges open   Wound Length (cm) 1 cm   Wound Width (cm) 0.5 cm   Wound Depth (cm) 0 cm   Wound Surface Area (cm^2) 0.5 cm^2   Wound Volume (cm^3) 0 cm^3   Drainage Amount none   Care, Wound cleansed with;sterile normal saline  (soaked and cleansed well to remove dried crusted drainage)   Dressing Care dressing changed  (iodosorb gel, gauze, kerlix, ace)     Wound/ostomy - wound care completed today. Wound was covered with dried, crusted drainage. Area soaked and cleansed well to remove to reveal a clean, granulating wound bed. Patient tolerated wound care well, no pain with manipulation of wound. Wound nurse will continue to manage dressing changes while patient is here, dressing to be changed 2 times weekly. Patient is current with [facility]  for dressing changes.

## 2022-08-04 NOTE — PLAN OF CARE
Goal Outcome Evaluation:  Plan of Care Reviewed With: patient        Progress: no change  Outcome Evaluation: Pt A&O x3, Mg replacement this shift, lab AM. CIWA 3-7, c/o generalized pain, treated with PRN pain med. Q2 turns. BM x3. Resting well. VSS. Will continue to monitor.

## 2022-08-04 NOTE — PROGRESS NOTES
[facility]  Inpatient Progress Note    Reason for Follow-up: Cirrhosis, encephalopathy     Subjective     Interval History:   Obtunded.  Received Dilaudid about 3 hours ago.  Patient will arouse to name begin to answer questions and then doze off.    CT without contrast with cirrhotic liver morphology, cholelithiasis with wall thickening, splenomegaly.    Right upper quadrant ultrasound with multiple gallstones but no wall thickening or.  Cholecystic fluid.  CBD at 6.3 mm.    Sodium down to 127.  Lipase elevated at 600.  Hemoglobin stable      Current Facility-Administered Medications:   •  acetaminophen (TYLENOL) tablet 650 mg, 650 mg, Oral, Q4H PRN **OR** acetaminophen (TYLENOL) 160 MG/5ML solution 650 mg, 650 mg, Oral, Q4H PRN **OR** acetaminophen (TYLENOL) suppository 650 mg, 650 mg, Rectal, Q4H PRN, Foreign Wilkins MD  •  allopurinol (ZYLOPRIM) tablet 100 mg, 100 mg, Oral, Daily, Foreign Wilkins MD, 100 mg at 08/04/22 0858  •  cadexomer iodine (IODOSORB) 0.9 % gel 1 application, 1 application, Topical, Once per day on Mon Thu, Seferino Van MD, 1 application at 08/04/22 0859  •  carvedilol (COREG) tablet 12.5 mg, 12.5 mg, Oral, BID With Meals, Foreign Wilkins MD, 12.5 mg at 08/04/22 0858  •  citalopram (CeleXA) tablet 20 mg, 20 mg, Oral, Daily, Foreign Wilkins MD, 20 mg at 08/04/22 0858  •  LORazepam (ATIVAN) tablet 1 mg, 1 mg, Oral, Q2H PRN, 1 mg at 08/02/22 0216 **OR** diazePAM (VALIUM) injection 5 mg, 5 mg, Intravenous, Q2H PRN, 5 mg at 08/03/22 0333 **OR** LORazepam (ATIVAN) tablet 2 mg, 2 mg, Oral, Q1H PRN **OR** diazePAM (VALIUM) injection 10 mg, 10 mg, Intravenous, Q1H PRN, 10 mg at 08/02/22 2102 **OR** diazePAM (VALIUM) injection 10 mg, 10 mg, Intravenous, Q30 Min PRN, Foreign Wilkins MD, 10 mg at 08/03/22 0504  •  multivitamin (THERAGRAN) tablet 1 tablet, 1 tablet, Oral, Daily, 1 tablet at 08/04/22 0858 **AND** folic acid (FOLVITE) tablet 1 mg, 1  mg, Oral, Daily, Seferino Van MD, 1 mg at 08/04/22 0858  •  HYDROmorphone (DILAUDID) injection 0.5 mg, 0.5 mg, Intravenous, Q4H PRN, Seferino Van MD, 0.5 mg at 08/04/22 0858  •  lactulose (CHRONULAC) 10 GM/15ML solution 30 g, 30 g, Oral, TID, Seferino Van MD, 30 g at 08/04/22 0858  •  Magnesium Sulfate 2 gram Bolus, followed by 8 gram infusion (total Mg dose 10 grams)- Mg less than or equal to 1mg/dL, 2 g, Intravenous, PRN **OR** Magnesium Sulfate 2 gram / 50mL Infusion (GIVE X 3 BAGS TO EQUAL 6GM TOTAL DOSE) - Mg 1.1 - 1.5 mg/dl, 2 g, Intravenous, PRN, Last Rate: 25 mL/hr at 08/04/22 0329, 2 g at 08/04/22 0329 **OR** Magnesium Sulfate 4 gram infusion- Mg 1.6-1.9 mg/dL, 4 g, Intravenous, PRN, Sanjay Diane, APRN  •  nitroglycerin (NITROSTAT) SL tablet 0.4 mg, 0.4 mg, Sublingual, Q5 Min PRN, Foreign Wilkins MD  •  ondansetron (ZOFRAN) tablet 4 mg, 4 mg, Oral, Q6H PRN, 4 mg at 08/02/22 0316 **OR** ondansetron (ZOFRAN) injection 4 mg, 4 mg, Intravenous, Q6H PRN, Foreign Wilkins MD, 4 mg at 08/01/22 0345  •  oxyCODONE (ROXICODONE) immediate release tablet 5 mg, 5 mg, Oral, Q4H PRN, Seferino Van MD, 5 mg at 08/04/22 0556  •  pantoprazole (PROTONIX) EC tablet 40 mg, 40 mg, Oral, QAM, Foreign Wilkins MD, 40 mg at 08/04/22 0604  •  potassium chloride (K-DUR,KLOR-CON) ER tablet 40 mEq, 40 mEq, Oral, PRN **OR** potassium chloride (KLOR-CON) packet 40 mEq, 40 mEq, Oral, PRN **OR** potassium chloride 10 mEq in 100 mL IVPB, 10 mEq, Intravenous, Q1H PRN, Seferino Van MD  •  riFAXIMin (XIFAXAN) tablet 550 mg, 550 mg, Oral, Q12H, Foreign Wilkins MD, 550 mg at 08/04/22 0858  •  [COMPLETED] Insert peripheral IV, , , Once **AND** sodium chloride 0.9 % flush 10 mL, 10 mL, Intravenous, PRN, Tomasz Robles MD  •  sodium chloride 0.9 % flush 10 mL, 10 mL, Intravenous, Q12H, Foreign Wilkins MD, 10 mL at 08/04/22 0859  •  sodium chloride 0.9 % flush 10 mL, 10 mL,  Intravenous, PRN, Foreign Wilkins MD  •  thiamine (VITAMIN B-1) tablet 100 mg, 100 mg, Oral, Daily, Seferino Van MD, 100 mg at 08/04/22 0858  •  zinc sulfate (ZINCATE) capsule 220 mg, 220 mg, Oral, Daily, Foreign Wilkins MD, 220 mg at 08/04/22 0859  Review of Systems:    Constitutional: No fevers, chills, sweats   Respiratory: No shortness of breath, cough   Cardiovascular: No Chest pain, palpitations   Gastrointestinal: No nausea, vomiting, diarrhea   Genitourinary: No hematuria, dysuria    Objective     Vital Signs  Temp:  [98.3 °F (36.8 °C)-98.7 °F (37.1 °C)] 98.5 °F (36.9 °C)  Heart Rate:  [61-70] 68  Resp:  [16-20] 16  BP: (124-144)/(76-88) 140/88  Body mass index is 28.55 kg/m².    Intake/Output Summary (Last 24 hours) at 8/4/2022 1222  Last data filed at 8/4/2022 0900  Gross per 24 hour   Intake 720 ml   Output 900 ml   Net -180 ml     I/O this shift:  In: 240 [P.O.:240]  Out: 200 [Urine:200]     Physical Exam:   General: Awake, alert and conversive. No acute distress.   Eyes: Normal lids and lashes, no scleral icterus.   Neck: Supple and symmetric. Trachea midline.    Skin: Warm and dry, not jaundiced.    Cardiovascular: Regular rate and rhythm. No murmur.   Pulm: Quiet, even, nonlabored breathing. Clear to auscultation bilaterally.    Abdomen: Soft, nondistended, nontender. No rebound or guarding. Bowel sounds present in all 4 quadrants.   Extremities: No rashes or edema.   Psychiatric: Appropriate mood and affect. Cooperative.     Results Review:     I reviewed the patient's new clinical results.    Results from last 7 days   Lab Units 08/04/22  0833 08/03/22  0707 08/02/22  0637   WBC 10*3/mm3 5.79 5.58 4.42   HEMOGLOBIN g/dL 9.9* 10.2* 10.1*   HEMATOCRIT % 28.6* 29.9* 29.0*   PLATELETS 10*3/mm3 82* 86* 90*     Results from last 7 days   Lab Units 08/04/22  0857 08/03/22  0707 08/02/22  0637 08/01/22  1000   SODIUM mmol/L 127* 137 138 141   POTASSIUM mmol/L 3.8 4.0 3.7 4.1   CHLORIDE  mmol/L 102 109* 111* 111*   CO2 mmol/L 16.7* 16.0* 14.7* 14.7*   BUN mg/dL 10 14 22* 30*   CREATININE mg/dL 0.69* 1.00 1.64* 2.45*   CALCIUM mg/dL 8.5* 8.2* 8.4* 9.2   BILIRUBIN mg/dL 1.0  --  0.6 0.8   ALK PHOS U/L 96  --  75 77   ALT (SGPT) U/L 15  --  10 10   AST (SGOT) U/L 42*  --  27 28   GLUCOSE mg/dL 87 86 90 95     Results from last 7 days   Lab Units 08/03/22  0707 08/02/22  0637 08/01/22  1000   INR  1.27* 1.30* 1.28*     No results found for: LIPASE    Radiology:  US Gallbladder   Final Result   Multiple gallstones within the gallbladder. No appreciable wall   thickening or pericholecystic fluid       This report was finalized on 8/3/2022 7:17 PM by Dr. Aaron Novak M.D.          CT Abdomen Pelvis Without Contrast   Final Result   1. Cholelithiasis with gallbladder wall thickening somewhat limited by   subjacent motion. Although findings may be the sequela of hepatic   dysfunction, recommend ultrasound and/or HIDA scan if there is clinical   concern for cholecystitis.   2. Morphologic changes of cirrhosis with portal hypertension with   splenomegaly and portosystemic shunting as described above.   3. Nonobstructing nephrolithiasis, left greater than right.   4. Please see above for additional findings/recommendations.       This report was finalized on 8/3/2022 11:44 AM by Dr. Carlos Alberto De Anda M.D.          CT Head Without Contrast   Final Result   No acute process identified. Further evaluation could be   performed with an MRI examination of the brain as indicated.       Radiation dose reduction techniques were utilized, including automated   exposure control and exposure modulation based on body size.       This report was finalized on 8/1/2022 7:10 AM by Dr. Juan Boswell M.D.          CT Abdomen Pelvis Without Contrast   Final Result         Electronically signed by Donal Vigil MD on 07-31-22 at 1923      XR Chest 1 View   Final Result          Assessment & Plan     Patient Active Problem List    Diagnosis   • Iron deficiency anemia   • Hepatic cirrhosis (HCC)   • Screening for malignant neoplasm of colon   • Abdominal aortic aneurysm (AAA) (HCC)   • Hepatitis C virus infection   • Gout   • Hypercholesterolemia   • Hepatic cirrhosis (HCC)   • Thoracic aortic aneurysm without rupture (HCC)   • Hypertensive disorder   • Hip pain   • Chronic pain   • Bicuspid aortic valve   • Arthritis of right hip   • Aortic valve regurgitation   • Anxiety   • Abdominal aortic aneurysm (AAA) (HCC)   • Hepatic cirrhosis (HCC)   • Hepatitis C virus infection   • Chronic pain   • Hepatic encephalopathy (HCC)   • Alcoholic cirrhosis of liver without ascites (HCC)   • YANNI (acute kidney injury) (HCC)   • Hypomagnesemia   • Tophaceous gout       Assessment:  1. Hepatic encephalopathy, withdrawal may be contributing acute confusion  2. Cirrhosis with history of alcohol abuse as well as hepatitis C  3. Anemia, no active bleeding  4. YANNI, resolved  5. Hyponatremia    Plan:  · Likely an element of chronic pancreatitis contributing to his abdominal pain.  · Recommend 2 g sodium diet and 1500 mL fluid restriction.  · Continue lactulose with titration to achieve 3-4 bowel movements a day.  · Continue Xifaxan  · Will require EGD prior to discharge.    I discussed the patient's findings and my recommendations with patient, nursing staff and Dr. Padilla.    Deedee dictation used throughout this note.            DEYSI Clemons  [facility]  [address] [place] [address]  Office: [phone]

## 2022-08-04 NOTE — PROGRESS NOTES
Name: Macario Carpio ADMIT: 2022   : 1970  PCP: GraysonAndrew, CALLUM    MRN: [id number] LOS: 4 days   AGE/SEX: 51 y.o. male  ROOM: [facility]   Subjective   Chief Complaint   Patient presents with   • Altered Mental Status      More confused today.  He was calling out for a woman's name when I entered.  I asked him who that was and he looked at me and would maintain gaze but did not give me any additional response.  He stated he was in pain and that the pain was now mostly in his arms and legs.  I asked him if the pain medicine helped yesterday and he did not answer questions.  He did deny shortness of breath nausea vomiting and abdominal pain but I could not get further review of systems today.    Objective   Vital Signs  Temp:  [98.3 °F (36.8 °C)-98.7 °F (37.1 °C)] 98.5 °F (36.9 °C)  Heart Rate:  [65-70] 68  Resp:  [16-18] 16  BP: (124-144)/(76-88) 140/88  SpO2:  [98 %-99 %] 98 %  on   ;   Device (Oxygen Therapy): room air  Body mass index is 28.55 kg/m².    Physical Exam  Vitals and nursing note reviewed.   Constitutional:       Appearance: He is ill-appearing (chronically). He is not diaphoretic.   HENT:      Head: Normocephalic and atraumatic.   Eyes:      General:         Right eye: No discharge.         Left eye: No discharge.      Conjunctiva/sclera: Conjunctivae normal.   Cardiovascular:      Rate and Rhythm: Normal rate and regular rhythm.      Pulses: Normal pulses.   Pulmonary:      Effort: Pulmonary effort is normal.      Breath sounds: Decreased breath sounds present. No wheezing.   Abdominal:      General: There is no distension.      Palpations: Abdomen is soft.      Tenderness: There is abdominal tenderness. There is no guarding or rebound.   Musculoskeletal:         General: Swelling, tenderness and deformity present.      Cervical back: Neck supple. No tenderness.      Right lower leg: No edema.      Left lower leg: No edema.      Comments: Tenderness over joint of both hand and  left elbow, tophi present diffusely. Scabbed wound right foot and prior great toe amputation now dressed.   Skin:     General: Skin is warm and dry.   Neurological:      Mental Status: He is alert. He is disoriented.   Psychiatric:         Cognition and Memory: Cognition is impaired. Memory is impaired.         Results Review:       I reviewed the patient's new clinical results.  I reviewed imaging, agree with interpretation.  I reviewed telemetry, sinus.        Results from last 7 days   Lab Units 08/04/22  0833 08/03/22  0707 08/02/22 0637 08/01/22  1000   WBC 10*3/mm3 5.79 5.58 4.42 5.59   HEMOGLOBIN g/dL 9.9* 10.2* 10.1* 11.2*   PLATELETS 10*3/mm3 82* 86* 90* 108*     Results from last 7 days   Lab Units 08/04/22  0857 08/03/22  0707 08/02/22 0637 08/01/22  1000   SODIUM mmol/L 127* 137 138 141   POTASSIUM mmol/L 3.8 4.0 3.7 4.1   CHLORIDE mmol/L 102 109* 111* 111*   CO2 mmol/L 16.7* 16.0* 14.7* 14.7*   BUN mg/dL 10 14 22* 30*   CREATININE mg/dL 0.69* 1.00 1.64* 2.45*   GLUCOSE mg/dL 87 86 90 95   Estimated Creatinine Clearance: 147.4 mL/min (A) (by C-G formula based on SCr of 0.69 mg/dL (L)).  Results from last 7 days   Lab Units 08/04/22  0857 08/03/22  2110 08/03/22  0707 08/02/22 0637 08/01/22  1000   CALCIUM mg/dL 8.5*  --  8.2* 8.4* 9.2   ALBUMIN g/dL 2.90*  --  3.20* 3.00* 3.30*   MAGNESIUM mg/dL 2.0 1.1* 1.1* 1.7 2.4   PHOSPHORUS mg/dL 2.3*  --  2.7 3.6  --      Results from last 7 days   Lab Units 08/03/22  0707 08/02/22 0637 08/01/22  1000   INR  1.27* 1.30* 1.28*        allopurinol, 100 mg, Oral, Daily  cadexomer iodine, 1 application, Topical, Once per day on Mon Thu  carvedilol, 12.5 mg, Oral, BID With Meals  citalopram, 20 mg, Oral, Daily  multivitamin, 1 tablet, Oral, Daily   And  folic acid, 1 mg, Oral, Daily  lactulose, 30 g, Oral, TID  pantoprazole, 40 mg, Oral, QAM  rifAXIMin, 550 mg, Oral, Q12H  sodium chloride, 10 mL, Intravenous, Q12H  thiamine, 100 mg, Oral, Daily  zinc sulfate, 220  mg, Oral, Daily       Diet Regular    Assessment & Plan      Active Hospital Problems    Diagnosis  POA   • **Hepatic encephalopathy (HCC) [K72.90]  Yes   • 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       · Hepatic encephalopathy/alcoholic cirrhosis: Continue lactulose and Xifaxan.  Planned EGD eventually.  GI following.  · Pancreatitis: Gallstones noted.  No acute cholecystitis on ultrasound.  Surgery evaluated.  Will change to clear liquid diet and give IV fluids.  Control pain and nausea.   · YANNI on CKD: Improving.  Nephrology evaluated.  · Alcohol abuse/HCV: See above.  Benzodiazepine if needed.  Vitamin supplementation.  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  Continue allopurinol.  Alcohol abstinence will be important in controlling this disease progression.  · Low magnesium secondary to alcohol abuse: Continue replacement  · Low phosphorus: Replace  · Hypertension: Acceptable acutely.  · Chronic wound right foot: Dressed.  Wound care following.  · Prophylaxis: SCD.  He is having some worsening of thrombocytopenia so do not want to start chemical prophylaxis at this time.  · Disposition: TBD    Seferino Van MD  [facility]  08/04/22  15:50 EDT    Dictated portions using Dragon dictation software.    During the entire encounter, I was wearing recommended PPE including face mask and eye protection. Hand sanitization was performed prior to entering room and upon exit.

## 2022-08-04 NOTE — PROGRESS NOTES
Reviewed chart and attempted to interview pt. @ [id number]  Pt initially responded to his name. Introduced self as KIKE therapist who heads up [facility] IOP at [facility].Began to educate on [facility] program. Pt unable to stay awake to interview. Told pt I was leaving pamphlet and my card and Access would follow up with pt when he could participate.

## 2022-08-05 LAB
ALBUMIN SERPL-MCNC: 2.7 G/DL (ref 3.5–5.2)
ALBUMIN/GLOB SERPL: 0.8 G/DL
ALP SERPL-CCNC: 109 U/L (ref 39–117)
ALT SERPL W P-5'-P-CCNC: 16 U/L (ref 1–41)
ANION GAP SERPL CALCULATED.3IONS-SCNC: 10 MMOL/L (ref 5–15)
AST SERPL-CCNC: 52 U/L (ref 1–40)
BILIRUB SERPL-MCNC: 1.4 MG/DL (ref 0–1.2)
BUN SERPL-MCNC: 8 MG/DL (ref 6–20)
BUN/CREAT SERPL: 11.6 (ref 7–25)
CALCIUM SPEC-SCNC: 8 MG/DL (ref 8.6–10.5)
CHLORIDE SERPL-SCNC: 101 MMOL/L (ref 98–107)
CO2 SERPL-SCNC: 17 MMOL/L (ref 22–29)
CREAT SERPL-MCNC: 0.69 MG/DL (ref 0.76–1.27)
DEPRECATED RDW RBC AUTO: 45.1 FL (ref 37–54)
EGFRCR SERPLBLD CKD-EPI 2021: 112 ML/MIN/1.73
ERYTHROCYTE [DISTWIDTH] IN BLOOD BY AUTOMATED COUNT: 13.9 % (ref 12.3–15.4)
GLOBULIN UR ELPH-MCNC: 3.4 GM/DL
GLUCOSE SERPL-MCNC: 108 MG/DL (ref 65–99)
HCT VFR BLD AUTO: 28.9 % (ref 37.5–51)
HGB BLD-MCNC: 10.1 G/DL (ref 13–17.7)
LIPASE SERPL-CCNC: 375 U/L (ref 13–60)
MAGNESIUM SERPL-MCNC: 1.1 MG/DL (ref 1.6–2.6)
MCH RBC QN AUTO: 31.5 PG (ref 26.6–33)
MCHC RBC AUTO-ENTMCNC: 34.9 G/DL (ref 31.5–35.7)
MCV RBC AUTO: 90 FL (ref 79–97)
PHOSPHATE SERPL-MCNC: 1.9 MG/DL (ref 2.5–4.5)
PHOSPHATE SERPL-MCNC: 2 MG/DL (ref 2.5–4.5)
PHOSPHATE SERPL-MCNC: 2.2 MG/DL (ref 2.5–4.5)
PLATELET # BLD AUTO: 87 10*3/MM3 (ref 140–450)
PMV BLD AUTO: 11.4 FL (ref 6–12)
POTASSIUM SERPL-SCNC: 3.9 MMOL/L (ref 3.5–5.2)
PROT SERPL-MCNC: 6.1 G/DL (ref 6–8.5)
RBC # BLD AUTO: 3.21 10*6/MM3 (ref 4.14–5.8)
SODIUM SERPL-SCNC: 128 MMOL/L (ref 136–145)
WBC NRBC COR # BLD: 8.21 10*3/MM3 (ref 3.4–10.8)

## 2022-08-05 PROCEDURE — 99231 SBSQ HOSP IP/OBS SF/LOW 25: CPT | Performed by: SURGERY

## 2022-08-05 PROCEDURE — 83690 ASSAY OF LIPASE: CPT | Performed by: INTERNAL MEDICINE

## 2022-08-05 PROCEDURE — 84100 ASSAY OF PHOSPHORUS: CPT | Performed by: INTERNAL MEDICINE

## 2022-08-05 PROCEDURE — 80053 COMPREHEN METABOLIC PANEL: CPT | Performed by: INTERNAL MEDICINE

## 2022-08-05 PROCEDURE — 25010000002 MAGNESIUM SULFATE 2 GM/50ML SOLUTION: Performed by: NURSE PRACTITIONER

## 2022-08-05 PROCEDURE — 97165 OT EVAL LOW COMPLEX 30 MIN: CPT

## 2022-08-05 PROCEDURE — 97530 THERAPEUTIC ACTIVITIES: CPT

## 2022-08-05 PROCEDURE — 99232 SBSQ HOSP IP/OBS MODERATE 35: CPT | Performed by: INTERNAL MEDICINE

## 2022-08-05 PROCEDURE — 97162 PT EVAL MOD COMPLEX 30 MIN: CPT

## 2022-08-05 PROCEDURE — 83735 ASSAY OF MAGNESIUM: CPT | Performed by: INTERNAL MEDICINE

## 2022-08-05 PROCEDURE — 85027 COMPLETE CBC AUTOMATED: CPT | Performed by: INTERNAL MEDICINE

## 2022-08-05 RX ORDER — FAMOTIDINE 20 MG/1
20 TABLET, FILM COATED ORAL
Status: DISCONTINUED | OUTPATIENT
Start: 2022-08-05 | End: 2022-08-14 | Stop reason: HOSPADM

## 2022-08-05 RX ORDER — HYDROMORPHONE HYDROCHLORIDE 1 MG/ML
0.25 INJECTION, SOLUTION INTRAMUSCULAR; INTRAVENOUS; SUBCUTANEOUS EVERY 4 HOURS PRN
Status: DISCONTINUED | OUTPATIENT
Start: 2022-08-05 | End: 2022-08-06

## 2022-08-05 RX ADMIN — OXYCODONE 10 MG: 5 TABLET ORAL at 01:15

## 2022-08-05 RX ADMIN — LACTULOSE 30 G: 10 SOLUTION ORAL at 21:11

## 2022-08-05 RX ADMIN — Medication 220 MG: at 08:27

## 2022-08-05 RX ADMIN — CITALOPRAM 20 MG: 20 TABLET, FILM COATED ORAL at 08:27

## 2022-08-05 RX ADMIN — ALLOPURINOL 100 MG: 100 TABLET ORAL at 08:27

## 2022-08-05 RX ADMIN — MAGNESIUM SULFATE HEPTAHYDRATE 2 G: 2 INJECTION, SOLUTION INTRAVENOUS at 17:25

## 2022-08-05 RX ADMIN — LACTULOSE 30 G: 10 SOLUTION ORAL at 08:27

## 2022-08-05 RX ADMIN — OXYCODONE 10 MG: 5 TABLET ORAL at 21:12

## 2022-08-05 RX ADMIN — SODIUM CHLORIDE 100 ML/HR: 9 INJECTION, SOLUTION INTRAVENOUS at 12:44

## 2022-08-05 RX ADMIN — RIFAXIMIN 550 MG: 550 TABLET ORAL at 08:27

## 2022-08-05 RX ADMIN — Medication 100 MG: at 08:27

## 2022-08-05 RX ADMIN — OXYCODONE 5 MG: 5 TABLET ORAL at 12:42

## 2022-08-05 RX ADMIN — MAGNESIUM SULFATE HEPTAHYDRATE 2 G: 2 INJECTION, SOLUTION INTRAVENOUS at 13:16

## 2022-08-05 RX ADMIN — RIFAXIMIN 550 MG: 550 TABLET ORAL at 21:11

## 2022-08-05 RX ADMIN — Medication 1 TABLET: at 08:27

## 2022-08-05 RX ADMIN — CARVEDILOL 12.5 MG: 12.5 TABLET, FILM COATED ORAL at 17:25

## 2022-08-05 RX ADMIN — LACTULOSE 30 G: 10 SOLUTION ORAL at 17:25

## 2022-08-05 RX ADMIN — CARVEDILOL 12.5 MG: 12.5 TABLET, FILM COATED ORAL at 08:27

## 2022-08-05 RX ADMIN — Medication 1 MG: at 08:27

## 2022-08-05 RX ADMIN — Medication 2 PACKET: at 13:16

## 2022-08-05 RX ADMIN — PANTOPRAZOLE SODIUM 40 MG: 40 TABLET, DELAYED RELEASE ORAL at 06:50

## 2022-08-05 RX ADMIN — MAGNESIUM SULFATE HEPTAHYDRATE 2 G: 2 INJECTION, SOLUTION INTRAVENOUS at 15:23

## 2022-08-05 RX ADMIN — OXYCODONE 10 MG: 5 TABLET ORAL at 06:52

## 2022-08-05 RX ADMIN — FAMOTIDINE 20 MG: 20 TABLET ORAL at 17:25

## 2022-08-05 RX ADMIN — Medication 2 PACKET: at 21:11

## 2022-08-05 RX ADMIN — Medication 10 ML: at 21:11

## 2022-08-05 NOTE — PROGRESS NOTES
Name: Macario Carpio ADMIT: 2022   : 1970  PCP: Andrew Grayson, APRN    MRN: [id number] LOS: 5 days   AGE/SEX: 51 y.o. male  ROOM: [facility]   Subjective   Chief Complaint   Patient presents with   • Altered Mental Status      Drowsy but will awaken.  He reports his abdominal pain.    Objective   Vital Signs  Temp:  [98.5 °F (36.9 °C)-100 °F (37.8 °C)] 99.7 °F (37.6 °C)  Heart Rate:  [74-79] 74  Resp:  [18] 18  BP: (113-148)/(69-89) 113/69  SpO2:  [96 %-98 %] 96 %  on   ;   Device (Oxygen Therapy): room air  Body mass index is 28.55 kg/m².    Physical Exam  Vitals and nursing note reviewed.   Constitutional:       Appearance: He is ill-appearing (chronically). He is not diaphoretic.   HENT:      Head: Normocephalic and atraumatic.   Eyes:      General:         Right eye: No discharge.         Left eye: No discharge.      Conjunctiva/sclera: Conjunctivae normal.   Cardiovascular:      Rate and Rhythm: Normal rate and regular rhythm.      Pulses: Normal pulses.   Pulmonary:      Effort: Pulmonary effort is normal.      Breath sounds: Decreased breath sounds present. No wheezing.   Abdominal:      General: There is no distension.      Palpations: Abdomen is soft.      Tenderness: There is abdominal tenderness. There is no guarding or rebound.   Musculoskeletal:         General: Swelling, tenderness and deformity present.      Cervical back: Neck supple. No tenderness.      Right lower leg: No edema.      Left lower leg: No edema.      Comments: Tenderness over joint of both hand and left elbow, tophi present diffusely. Scabbed wound right foot and prior great toe amputation now dressed.   Skin:     General: Skin is warm and dry.   Neurological:      Mental Status: He is alert. He is disoriented.   Psychiatric:         Cognition and Memory: Cognition is impaired. Memory is impaired.         Results Review:       I reviewed the patient's new clinical results.  I reviewed imaging, agree with  interpretation.  I reviewed telemetry, sinus.        Results from last 7 days   Lab Units 08/05/22  1058 08/04/22  0833 08/03/22  0707 08/02/22  0637   WBC 10*3/mm3 8.21 5.79 5.58 4.42   HEMOGLOBIN g/dL 10.1* 9.9* 10.2* 10.1*   PLATELETS 10*3/mm3 87* 82* 86* 90*     Results from last 7 days   Lab Units 08/05/22  1058 08/04/22  0857 08/03/22  0707 08/02/22  0637   SODIUM mmol/L 128* 127* 137 138   POTASSIUM mmol/L 3.9 3.8 4.0 3.7   CHLORIDE mmol/L 101 102 109* 111*   CO2 mmol/L 17.0* 16.7* 16.0* 14.7*   BUN mg/dL 8 10 14 22*   CREATININE mg/dL 0.69* 0.69* 1.00 1.64*   GLUCOSE mg/dL 108* 87 86 90   Estimated Creatinine Clearance: 147.4 mL/min (A) (by C-G formula based on SCr of 0.69 mg/dL (L)).  Results from last 7 days   Lab Units 08/05/22  1058 08/04/22  0857 08/03/22 2110 08/03/22  0707 08/02/22  0637   CALCIUM mg/dL 8.0* 8.5*  --  8.2* 8.4*   ALBUMIN g/dL 2.70* 2.90*  --  3.20* 3.00*   MAGNESIUM mg/dL  --  2.0 1.1* 1.1* 1.7   PHOSPHORUS mg/dL  --  2.3*  --  2.7 3.6     Results from last 7 days   Lab Units 08/03/22  0707 08/02/22  0637 08/01/22  1000   INR  1.27* 1.30* 1.28*        allopurinol, 100 mg, Oral, Daily  cadexomer iodine, 1 application, Topical, Once per day on Mon Thu  carvedilol, 12.5 mg, Oral, BID With Meals  citalopram, 20 mg, Oral, Daily  multivitamin, 1 tablet, Oral, Daily   And  folic acid, 1 mg, Oral, Daily  lactulose, 30 g, Oral, TID  pantoprazole, 40 mg, Oral, QAM  rifAXIMin, 550 mg, Oral, Q12H  sodium chloride, 10 mL, Intravenous, Q12H  thiamine, 100 mg, Oral, Daily  zinc sulfate, 220 mg, Oral, Daily      sodium chloride, 100 mL/hr, Last Rate: 100 mL/hr (08/05/22 1244)    Diet Regular; Low Fat, Low Sodium    Assessment & Plan      Active Hospital Problems    Diagnosis  POA   • **Hepatic encephalopathy (HCC) [K72.90]  Yes   • Tophaceous gout [M1A.9XX1]  Yes   • YANNI (acute kidney injury) (HCC) [N17.9]  Yes   • Hypomagnesemia [E83.42]  Yes   • Alcoholic cirrhosis of liver without ascites (HCC)  [K70.30]  Yes   • Hypertensive disorder [I10]  Yes      Resolved Hospital Problems   No resolved problems to display.       · Hepatic encephalopathy/alcoholic cirrhosis: Continue lactulose and Xifaxan.  Planned EGD eventually.  GI following.  · Pancreatitis: Gallstones noted.  No acute cholecystitis on ultrasound.  Surgery evaluated.  Diet advanced.  He has hyponatremia now but it is about stable compared to yesterday despite fluids overnight.  Since his diet is advanced we will let the fluids  and continue to monitor the diet although I am going to take low sodium off of the dietary restrictions until that is improved some.  · YANNI on CKD: Improved.  Nephrology evaluated.  · Alcohol abuse/HCV: HCV viral load was not detected.  See above.  Benzodiazepine if needed.  Vitamin supplementation.  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  Continue allopurinol.  Alcohol abstinence will be important in controlling this disease progression.  · Low magnesium secondary to alcohol abuse: Continue replacement as needed  · Low phosphorus: Replace  · Hypertension: Acceptable acutely.  · Chronic wound right foot: Dressed.  Wound care following.  · Prophylaxis: SCD.  Platelet count stabilizing.  Could start Lovenox depending on timing of the EGD.  · Disposition: TBD    Seferino Van MD  [facility]  22  12:49 EDT    Dictated portions using Dragon dictation software.    During the entire encounter, I was wearing recommended PPE including face mask and eye protection. Hand sanitization was performed prior to entering room and upon exit.

## 2022-08-05 NOTE — PLAN OF CARE
Goal Outcome Evaluation:  Plan of Care Reviewed With: patient           Outcome Evaluation: Patient is a 51 y.o male who presents to [facility] with AMS. Pmhx includes alcholism. Patient lives with his wife. Patient personal history unclear at this time as patient is confused. Patient sat up to side of bed today with Kiley-modA. Patients movements very slow with patient initally declining assistance. Patient sat up at EOB with CGA to maintain sitting balance. Patient declined further transfers today despite max encouragement. Educated patient on importance of mobility. Patient would benefit from skilled PT to address deficits in strength, balance, and activity endurance limiting functional mobility. PT recommends SNF at d/c. Will continue to monitor.

## 2022-08-05 NOTE — DISCHARGE PLACEMENT REQUEST
"Igor Carpio (51 y.o. Male)             Date of Birth   1970    Social Security Number       Address   [address] [place] [address]    Home Phone   [phone]    MRN   [id number]       Zoroastrian   None    Marital Status   Single                            Admission Date   7/31/22    Admission Type   Emergency    Admitting Provider   Foreign Wilkins MD    Attending Provider   Seferino Van MD    Department, Room/Bed   [address], S611/1       Discharge Date       Discharge Disposition       Discharge Destination                               Attending Provider: Seferino Van MD    Allergies: Indomethacin    Isolation: None   Infection: None   Code Status: CPR   Advance Care Planning Activity    Ht: 180.3 cm (71\")   Wt: 92.9 kg (204 lb 11.2 oz)    Admission Cmt: None   Principal Problem: Hepatic encephalopathy (HCC) [K72.90]                 Active Insurance as of 7/31/2022     Primary Coverage     Payor Plan Insurance Group Employer/Plan Group    [employer] MEDICARE REPLACEMENT HU[employer]NA MEDICARE REPLACEMENT [id number]     Payor Plan Address Payor Plan Phone Number Payor Plan Fax Number Effective Dates    [address] [address] [phone]  11/1/2019 - None Entered    [facility] [id number]       Subscriber Name Subscriber Birth Date Member ID       IGOR CARPIO 1970 [id number]                 Emergency Contacts      (Rel.) Home Phone Work Phone Mobile Phone    HERBIE WHITTINGTON (Spouse) [phone] -- [phone]    Cedrick Mary Ann Carpio (Mother) [phone] -- --            "

## 2022-08-05 NOTE — NURSING NOTE
"Patient sitting up in bed attempting to eat. Slow speech pattern. States \"I want my meds\" explained my role in his care and why I was there. Patient states \"I feel better\". Denied any complaints.     CCP working on either home with [facility] vs SNF. Referral to [facility] pending.     Access will continue to follow.   "

## 2022-08-05 NOTE — CASE MANAGEMENT/SOCIAL WORK
Continued Stay Note  [facility]     Patient Name: Macario Carpio  MRN: [id number]  Today's Date: 8/5/2022    Admit Date: 7/31/2022     Discharge Plan     Row Name 08/05/22 1157       Plan    Plan Home with [facility]  vs SNF- Referral to [facility] pending.    Patient/Family in Agreement with Plan yes    Plan Comments Spoke with patient spouse Marco [phone] to discuss possible need for SNF at NH. Sp[facility] was agreeable [facility] Referral placed in [facility] and called to [facility]. PT order pending. Will need [employer] MCR precert. Packet in CCP office. CCP to follow up Monday. Mike EPPERSON               Discharge Codes    No documentation.               Expected Discharge Date and Time     Expected Discharge Date Expected Discharge Time    Aug 8, 2022             Mike Johnson RN

## 2022-08-05 NOTE — THERAPY EVALUATION
Patient Name: Macario Carpio  : 1970    MRN: [id number]                              Today's Date: 2022       Admit Date: 2022    Visit Dx:     ICD-10-CM ICD-9-CM   1. Hepatic encephalopathy (HCC)  K72.90 572.2   2. Uremic encephalopathy  G93.49 348.31    N19    3. Acute kidney injury (HCC)  N17.9 584.9   4. Hypomagnesemia  E83.42 275.2   5. Alcoholic cirrhosis, unspecified whether ascites present (HCC)  K70.30 571.2     Patient Active Problem List   Diagnosis   • Iron deficiency anemia   • Hepatic cirrhosis (HCC)   • Screening for malignant neoplasm of colon   • Abdominal aortic aneurysm (AAA) (HCC)   • Hepatitis C virus infection   • Gout   • Hypercholesterolemia   • Hepatic cirrhosis (HCC)   • Thoracic aortic aneurysm without rupture (HCC)   • Hypertensive disorder   • Hip pain   • Chronic pain   • Bicuspid aortic valve   • Arthritis of right hip   • Aortic valve regurgitation   • Anxiety   • Abdominal aortic aneurysm (AAA) (HCC)   • Hepatic cirrhosis (HCC)   • Hepatitis C virus infection   • Chronic pain   • Hepatic encephalopathy (HCC)   • Alcoholic cirrhosis of liver without ascites (HCC)   • YANNI (acute kidney injury) (HCC)   • Hypomagnesemia   • Tophaceous gout     Past Medical History:   Diagnosis Date   • Gout    • Hepatitis C      Past Surgical History:   Procedure Laterality Date   • TOE AMPUTATION     • TOTAL HIP ARTHROPLASTY Right 2020      General Information     Row Name 22 1457          Physical Therapy Time and Intention    Document Type evaluation  -SM     Mode of Treatment individual therapy;physical therapy  -     Row Name 22 1457          General Information    Patient Profile Reviewed yes  -SM     Prior Level of Function --  Prior level function unclear at this time as patient not oriented. Noone present at bedside to assist with pmhx  -SM     Existing Precautions/Restrictions fall  -SM     Barriers to Rehab previous functional deficit  -SM     Row Name  08/05/22 1457          Living Environment    People in Home spouse  -     Row Name 08/05/22 1457          Cognition    Orientation Status (Cognition) oriented to;person  -     Row Name 08/05/22 1457          Safety Issues, Functional Mobility    Safety Issues Affecting Function (Mobility) ability to follow commands;problem-solving;insight into deficits/self-awareness  -     Impairments Affecting Function (Mobility) endurance/activity tolerance;strength;pain  -           User Key  (r) = Recorded By, (t) = Taken By, (c) = Cosigned By    Initials Name Provider Type     Valarie Haley PT Physical Therapist               Mobility     Row Name 08/05/22 1458          Bed Mobility    Bed Mobility supine-sit;sit-supine  -     Supine-Sit Will (Bed Mobility) minimum assist (75% patient effort);moderate assist (50% patient effort);2 person assist  -     Sit-Supine Will (Bed Mobility) minimum assist (75% patient effort);moderate assist (50% patient effort);2 person assist  -     Comment, (Bed Mobility) Patient moving very slow, increased time to perform. Patient declines assistance at first.  -     Row Name 08/05/22 1458          Bed-Chair Transfer    Bed-Chair Will (Transfers) not tested  -[facility] Name 08/05/22 1458          Sit-Stand Transfer    Sit-Stand Will (Transfers) not tested  -     Comment, (Sit-Stand Transfer) Max encouragement to performed. Patient declined said she would try tomorrow  -[facility] Name 08/05/22 1458          Gait/Stairs (Locomotion)    Will Level (Gait) not tested  -     Will Level (Stairs) not tested  -           User Key  (r) = Recorded By, (t) = Taken By, (c) = Cosigned By    Initials Name Provider Type     Valarie Haley PT Physical Therapist               Obj/Interventions     Row Name 08/05/22 1501          Range of Motion Comprehensive    Comment, General Range of Motion B Knee flexion contractures. Increased  tone B LEs  -[facility] Name 08/05/22 1501          Strength Comprehensive (MMT)    Comment, General Manual Muscle Testing (MMT) Assessment Generalized weakness. Unable to formally assess due to pain and decreased willingness to participate  -[facility] Name 08/05/22 1501          Motor Skills    Motor Skills functional endurance  -[facility] Name 08/05/22 1501          Balance    Balance Assessment sitting static balance;sitting dynamic balance  -     Static Sitting Balance contact guard  -     Dynamic Sitting Balance contact guard  -     Position, Sitting Balance sitting edge of bed  -     Sit to Stand Dynamic Balance not tested  -     Static Standing Balance not tested  -     Dynamic Standing Balance not tested  -     Balance Interventions sitting;supported;static;dynamic  -           User Key  (r) = Recorded By, (t) = Taken By, (c) = Cosigned By    Initials Name Provider Type     Valarie Haley, PT Physical Therapist               Goals/Plan     [facility] Name 08/05/22 1512          Bed Mobility Goal 1 (PT)    Activity/Assistive Device (Bed Mobility Goal 1, PT) bed mobility activities, all  -SM     [place] Level/Cues Needed (Bed Mobility Goal 1, PT) contact guard required;minimum assist (75% or more patient effort)  -     Time Frame (Bed Mobility Goal 1, PT) 1 week  -[facility] Name 08/05/22 1512          Transfer Goal 1 (PT)    Activity/Assistive Device (Transfer Goal 1, PT) transfers, all  -SM     [place] Level/Cues Needed (Transfer Goal 1, PT) contact guard required  -     Time Frame (Transfer Goal 1, PT) 1 week  -SM     Row Name 08/05/22 1512          Gait Training Goal 1 (PT)    Activity/Assistive Device (Gait Training Goal 1, PT) gait (walking locomotion);walker, rolling  -     [place] Level (Gait Training Goal 1, PT) contact guard required  -     Distance (Gait Training Goal 1, PT) 75ft  -SM     Time Frame (Gait Training Goal 1, PT) 1 week  -           User Key  (r)  = Recorded By, (t) = Taken By, (c) = Cosigned By    Initials Name Provider Type     Valarie Haley, PT Physical Therapist               Clinical Impression     Row Name 08/05/22 1502          Pain    Pre/Posttreatment Pain Comment Patient did not verbalize number but reports pain in L elbow and shoulder along with BLE  -SM     Pain Intervention(s) Rest;Repositioned  -     Row Name 08/05/22 1502          Plan of Care Review    Plan of Care Reviewed With patient  -     Outcome Evaluation Patient is a 51 y.o male who presents to [facility] with AMS. Pmhx includes alcholism. Patient lives with his wife. Patient personal history unclear at this time as patient is confused. Patient sat up to side of bed today with Kiley-modA. Patients movements very slow with patient initally declining assistance. Patient sat up at EOB with CGA to maintain sitting balance. Patient declined further transfers today despite max encouragement. Educated patient on importance of mobility. Patient would benefit from skilled PT to address deficits in strength, balance, and activity endurance limiting functional mobility. PT recommends SNF at d/c. Will continue to monitor.  -     Row Name 08/05/22 1502          Therapy Assessment/Plan (PT)    Rehab Potential (PT) good, to achieve stated therapy goals  -     Criteria for Skilled Interventions Met (PT) yes  -     Therapy Frequency (PT) 6 times/wk  -     Row Name 08/05/22 1502          Vital Signs    O2 Delivery Pre Treatment room air  -SM     O2 Delivery Intra Treatment room air  -SM     O2 Delivery Post Treatment room air  -SM     Pre Patient Position Supine  -SM     Intra Patient Position Standing  -SM     Post Patient Position Supine  -     Row Name 08/05/22 1502          Positioning and Restraints    Pre-Treatment Position in bed  -SM     Post Treatment Position bed  -SM     In Bed call light within reach;encouraged to call for assist;exit alarm on;notified [facility]  -           User Key   (r) = Recorded By, (t) = Taken By, (c) = Cosigned By    Initials Name Provider Type    Valarie Snea PT Physical Therapist               Outcome Measures     Row Name 08/05/22 1513          How much help from another person do you currently need...    Turning from your back to your side while in flat bed without using bedrails? 2  -SM     Moving from lying on back to sitting on the side of a flat bed without bedrails? 2  -SM     Moving to and from a bed to a chair (including a wheelchair)? 1  -SM     Standing up from a chair using your arms (e.g., wheelchair, bedside chair)? 1  -SM     Climbing 3-5 steps with a railing? 1  -SM     To walk in hospital room? 1  -     AM-PAC 6 Clicks Score (PT) 8  -SM     Highest level of mobility 3 --> Sat at edge of bed  -SM     Row Name 08/05/22 1514          Modified Una Scale    Modified [place] Scale 4 - Moderately severe disability.  Unable to walk without assistance, and unable to attend to own bodily needs without assistance.  -KN     Row Name 08/05/22 1514 08/05/22 1513       Functional Assessment    Outcome Measure Options AM-PAC 6 Clicks Daily Activity (OT);Modified Una  -KN AM-PAC 6 Clicks Basic Mobility (PT)  -          User Key  (r) = Recorded By, (t) = Taken By, (c) = Cosigned By    Initials Name Provider Type    Beto Patel, OT Occupational Therapist    Valarie Sena PT Physical Therapist                             Physical Therapy Education                 Title: PT OT SLP Therapies (Done)     Topic: Physical Therapy (Done)     Point: Mobility training (Done)     Learning Progress Summary           Patient Acceptance, E, VU by  at 8/5/2022 1513                   Point: Home exercise program (Done)     Learning Progress Summary           Patient Acceptance, E, VU by  at 8/5/2022 1513                   Point: Body mechanics (Done)     Learning Progress Summary           Patient Acceptance, E, VU by  at 8/5/2022 1513                    Point: Precautions (Done)     Learning Progress Summary           Patient Acceptance, E, VU by  at 8/5/2022 1513                               User Key     Initials Effective Dates Name Provider Type Discipline     05/02/22 -  Valarie Haley PT Physical Therapist PT              PT Recommendation and Plan     Plan of Care Reviewed With: patient  Outcome Evaluation: Patient is a 51 y.o male who presents to [facility] with AMS. Pmhx includes alcholism. Patient lives with his wife. Patient personal history unclear at this time as patient is confused. Patient sat up to side of bed today with Kiley-modA. Patients movements very slow with patient initally declining assistance. Patient sat up at EOB with CGA to maintain sitting balance. Patient declined further transfers today despite max encouragement. Educated patient on importance of mobility. Patient would benefit from skilled PT to address deficits in strength, balance, and activity endurance limiting functional mobility. PT recommends SNF at d/c. Will continue to monitor.     Time Calculation:    PT Charges     Row Name 08/05/22 1514             Time Calculation    Start Time 1414  -      Stop Time 1446  -      Time Calculation (min) 32 min  -      PT Received On 08/05/22  -      PT - Next Appointment 08/06/22  -      PT Goal Re-Cert Due Date 08/12/22  -              Time Calculation- PT    Total Timed Code Minutes- PT 23 minute(s)  -              Timed Charges    31333 - PT Therapeutic Activity Minutes 23  -SM              Total Minutes    Timed Charges Total Minutes 23  -SM       Total Minutes 23  -SM            User Key  (r) = Recorded By, (t) = Taken By, (c) = Cosigned By    Initials Name Provider Type     Valarie Haley PT Physical Therapist              Therapy Charges for Today     Code Description Service Date Service Provider Modifiers Qty    [id number]  PT THERAPEUTIC ACT EA 15 MIN 8/5/2022 Valarie Haley PT GP 2    [id number]   PT EVAL MOD COMPLEXITY 2 8/5/2022 Valarie Haley, PT GP 1          PT G-Codes  Outcome Measure Options: AM-PAC 6 Clicks Daily Activity (OT), Modified Burt  AM-PAC 6 Clicks Score (PT): 8  AM-PAC 6 Clicks Score (OT): 13  Modified [place] Scale: 4 - Moderately severe disability.  Unable to walk without assistance, and unable to attend to own bodily needs without assistance.  Patient was not wearing a face mask during this therapy encounter. Therapist used appropriate personal protective equipment including mask and gloves.  Mask used was standard procedure mask. Appropriate PPE was worn during the entire therapy session. Hand hygiene was completed before and after therapy session. Patient is not in enhanced droplet precautions.     Valarie Haley, KELVIN  8/5/2022

## 2022-08-05 NOTE — THERAPY EVALUATION
Patient Name: Macario Carpio  : 1970    MRN: [id number]                              Today's Date: 2022       Admit Date: 2022    Visit Dx:     ICD-10-CM ICD-9-CM   1. Hepatic encephalopathy (HCC)  K72.90 572.2   2. Uremic encephalopathy  G93.49 348.31    N19    3. Acute kidney injury (HCC)  N17.9 584.9   4. Hypomagnesemia  E83.42 275.2   5. Alcoholic cirrhosis, unspecified whether ascites present (HCC)  K70.30 571.2     Patient Active Problem List   Diagnosis   • Iron deficiency anemia   • Hepatic cirrhosis (HCC)   • Screening for malignant neoplasm of colon   • Abdominal aortic aneurysm (AAA) (HCC)   • Hepatitis C virus infection   • Gout   • Hypercholesterolemia   • Hepatic cirrhosis (HCC)   • Thoracic aortic aneurysm without rupture (HCC)   • Hypertensive disorder   • Hip pain   • Chronic pain   • Bicuspid aortic valve   • Arthritis of right hip   • Aortic valve regurgitation   • Anxiety   • Abdominal aortic aneurysm (AAA) (HCC)   • Hepatic cirrhosis (HCC)   • Hepatitis C virus infection   • Chronic pain   • Hepatic encephalopathy (HCC)   • Alcoholic cirrhosis of liver without ascites (HCC)   • YANNI (acute kidney injury) (HCC)   • Hypomagnesemia   • Tophaceous gout     Past Medical History:   Diagnosis Date   • Gout    • Hepatitis C      Past Surgical History:   Procedure Laterality Date   • TOE AMPUTATION     • TOTAL HIP ARTHROPLASTY Right 2020      General Information     Row Name 22 1456          OT Time and Intention    Document Type evaluation  -KN     Mode of Treatment co-treatment;physical therapy;occupational therapy  -KN     Row Name 22 1456          General Information    Patient Profile Reviewed yes  -KN     Prior Level of Function mod assist:;max assist:;ADL's  Pt reports wife has been helping him for a long time  -KN     Row Name 22 1456          Living Environment    People in Home sibling(s)  -KN     Row Name 22 1456          Cognition    Orientation  Status (Cognition) oriented x 3  alert; agitated  -           User Key  (r) = Recorded By, (t) = Taken By, (c) = Cosigned By    Initials Name Provider Type    Beto Patel OT Occupational Therapist                 Mobility/ADL's     Row Name 08/05/22 1459          Bed Mobility    Bed Mobility bed mobility (all) activities  -     All Activities, [place] (Bed Mobility) minimum assist (75% patient effort);moderate assist (50% patient effort);2 person assist  -[facility] Name 08/05/22 1459          Transfers    Comment, (Transfers) Pt declined STS  -[facility] Name 08/05/22 1459          Activities of Daily Living    BADL Assessment/Intervention bathing;upper body dressing;lower body dressing;grooming;feeding;toileting  -KN     Row Name 08/05/22 1459          Bathing Assessment/Intervention    [place] Level (Bathing) bathing skills;maximum assist (25% patient effort)  -[facility] Name 08/05/22 1459          Upper Body Dressing Assessment/Training    [place] Level (Upper Body Dressing) upper body dressing skills;maximum assist (25% patient effort)  -[facility] Name 08/05/22 [facility]9          Lower Body Dressing Assessment/Training    [place] Level (Lower Body Dressing) lower body dressing skills;maximum assist (25% patient effort)  -[facility] Name 08/05/22 1459          Grooming Assessment/Training    [place] Level (Grooming) grooming skills;maximum assist (25% patient effort)  -[facility] Name 08/05/22 1459          Self-Feeding Assessment/Training    [place] Level (Feeding) feeding skills;maximum assist (25% patient effort)  -[facility] Name 08/05/22 1459          Toileting Assessment/Training    [place] Level (Toileting) toileting skills;maximum assist (25% patient effort)  -           User Key  (r) = Recorded By, (t) = Taken By, (c) = Cosigned By    Initials Name Provider Type    Beto Patel OT Occupational Therapist               Obj/Interventions     Row Name 08/05/22  1500          Sensory Assessment (Somatosensory)    Sensory Assessment (Somatosensory) sensation intact  -KN     Row Name 08/05/22 1500          Vision Assessment/Intervention    Visual Impairment/Limitations WFL  -KN     Row Name 08/05/22 1500          Range of Motion Comprehensive    General Range of Motion upper extremity range of motion deficits identified  -KN     Row Name 08/05/22 1500          Strength Comprehensive (MMT)    General Manual Muscle Testing (MMT) Assessment upper extremity strength deficits identified  -KN     Row Name 08/05/22 1500          Motor Skills    Motor Skills coordination;functional endurance  -KN     Coordination WFL  -     Functional Endurance p -  -KN     Row Name 08/05/22 1500          Balance    Balance Assessment sitting static balance  -     Static Sitting Balance minimal assist;1-person assist  -           User Key  (r) = Recorded By, (t) = Taken By, (c) = Cosigned By    Initials Name Provider Type    Beto Patel, MAXIMO Occupational Therapist               Goals/Plan     Row Name 08/05/22 1510          Transfer Goal 1 (OT)    Activity/Assistive Device (Transfer Goal 1, OT) transfers, all  -KN     [place] Level/Cues Needed (Transfer Goal 1, OT) minimum assist (75% or more patient effort)  -     Time Frame (Transfer Goal 1, OT) long term goal (LTG);2 weeks  -KN     Row Name 08/05/22 1510          Dressing Goal 1 (OT)    Activity/Device (Dressing Goal 1, OT) dressing skills, all  -KN     [place]/Cues Needed (Dressing Goal 1, OT) minimum assist (75% or more patient effort);moderate assist (50-74% patient effort)  -     Time Frame (Dressing Goal 1, OT) long term goal (LTG);2 weeks  -KN     Row Name 08/05/22 1510          ROM Goal 1 (OT)    ROM Goal 1 (OT) Pt will increase BUE ROM to prevent deformities through AROM/PROM and positioning.  -KN     Time Frame (ROM Goal 1, OT) long term goal (LTG);2 weeks  -KN     Row Name 08/05/22 1510          Therapy  "Assessment/Plan (OT)    Planned Therapy Interventions (OT) activity tolerance training;BADL retraining;functional balance retraining;occupation/activity based interventions;patient/caregiver education/training;transfer/mobility retraining;strengthening exercise;ROM/therapeutic exercise  -           User Key  (r) = Recorded By, (t) = Taken By, (c) = Cosigned By    Initials Name Provider Type     Beto Smith, OT Occupational Therapist               Clinical Impression     [facility] Name 08/05/22 1503          Pain Assessment    Pretreatment Pain Rating 8/10  -     Posttreatment Pain Rating 8/10  -     Pain Location generalized  -[facility] Name 08/05/22 1503          Plan of Care Review    Plan of Care Reviewed With patient  -     Progress no change  -     Outcome Evaluation Pt admitted to [facility] on 8/5 with the above diagnosis. Pt slightly agitated when doing bed mobility. Increased time and encouragment needed. Pt screams in pain when you try and move him. Pt contracted on BUE shoulder and elbows. Decrease strength in shoulders, elbows. wrist and hands. Pt sat at EOB but refused to stand this go around stating \"ill do it tomorrow.\" Skilled OT services warranted to increase strength, ROM, and endurance for increased ind. with ADLs.  -     Row Name 08/05/22 1503          Therapy Assessment/Plan (OT)    Rehab Potential (OT) good, to achieve stated therapy goals  -     Criteria for Skilled Therapeutic Interventions Met (OT) yes;meets criteria;skilled treatment is necessary  -     Therapy Frequency (OT) 5 times/wk  -     Row Name 08/05/22 1503          Therapy Plan Review/Discharge Plan (OT)    Anticipated Discharge Disposition (OT) inpatient rehabilitation facility  -     Row Name 08/05/22 1503          Vital Signs    O2 Delivery Pre Treatment room air  -     O2 Delivery Intra Treatment room air  -     O2 Delivery Post Treatment room air  -           User Key  (r) = Recorded By, (t) = Taken By, (c) = " Cosigned By    Initials Name Provider Type    Beto Patel, MAXIMO Occupational Therapist               Outcome Measures     Row Name 08/05/22 1514          How much help from another is currently needed...    Putting on and taking off regular lower body clothing? 2  -KN     Bathing (including washing, rinsing, and drying) 2  -KN     Toileting (which includes using toilet bed pan or urinal) 2  -KN     Putting on and taking off regular upper body clothing 2  -KN     Taking care of personal grooming (such as brushing teeth) 2  -KN     Eating meals 3  -KN     AM-PAC 6 Clicks Score (OT) 13  -KN     Row Name 08/05/22 1513          How much help from another person do you currently need...    Turning from your back to your side while in flat bed without using bedrails? 2  -SM     Moving from lying on back to sitting on the side of a flat bed without bedrails? 2  -SM     Moving to and from a bed to a chair (including a wheelchair)? 1  -SM     Standing up from a chair using your arms (e.g., wheelchair, bedside chair)? 1  -SM     Climbing 3-5 steps with a railing? 1  -SM     To walk in hospital room? 1  -SM     AM-PAC 6 Clicks Score (PT) 8  -SM     Highest level of mobility 3 --> Sat at edge of bed  -SM     Row Name 08/05/22 1514          Modified [place] Scale    Modified [place] Scale 4 - Moderately severe disability.  Unable to walk without assistance, and unable to attend to own bodily needs without assistance.  -     Row Name 08/05/22 1514 08/05/22 1513       Functional Assessment    Outcome Measure Options AM-PAC 6 Clicks Daily Activity (OT);Modified [place]  -KN AM-PAC 6 Clicks Basic Mobility (PT)  -SM          User Key  (r) = Recorded By, (t) = Taken By, (c) = Cosigned By    Initials Name Provider Type    Beto Patel, MAXIMO Occupational Therapist    Valarie Sena PT Physical Therapist                  OT Recommendation and Plan  Planned Therapy Interventions (OT): activity tolerance training, BADL retraining,  "functional balance retraining, occupation/activity based interventions, patient/caregiver education/training, transfer/mobility retraining, strengthening exercise, ROM/therapeutic exercise  Therapy Frequency (OT): 5 times/wk  Plan of Care Review  Plan of Care Reviewed With: patient  Progress: no change  Outcome Evaluation: Pt admitted to [facility] on 8/5 with the above diagnosis. Pt slightly agitated when doing bed mobility. Increased time and encouragment needed. Pt screams in pain when you try and move him. Pt contracted on BUE shoulder and elbows. Decrease strength in shoulders, elbows. wrist and hands. Pt sat at EOB but refused to stand this go around stating \"ill do it tomorrow.\" Skilled OT services warranted to increase strength, ROM, and endurance for increased ind. with ADLs.     Time Calculation:    Time Calculation- OT     Row Name 08/05/22 1516             Time Calculation- OT    OT Start Time 1414  -KN      OT Stop Time 1446  -KN      OT Time Calculation (min) 32 min  -KN      OT Received On 08/05/22  -KN      OT - Next Appointment 08/06/22  -KN      OT Goal Re-Cert Due Date 08/19/22  -KN              Timed Charges    27686 - OT Therapeutic Activity Minutes 26  -KN              Untimed Charges    OT Eval/Re-eval Minutes 8  -KN              Total Minutes    Timed Charges Total Minutes 26  -KN      Untimed Charges Total Minutes 8  -KN       Total Minutes 34  -KN            User Key  (r) = Recorded By, (t) = Taken By, (c) = Cosigned By    Initials Name Provider Type    KN Beto Smith OT Occupational Therapist              Therapy Charges for Today     Code Description Service Date Service Provider Modifiers Qty    35504281875  OT THERAPEUTIC ACT EA 15 MIN 8/5/2022 Beto Smith OT GO 2    [id number]  OT EVAL LOW COMPLEXITY 1 8/5/2022 Beto Smith OT GO 1               Beto Smith OT  8/5/2022  "

## 2022-08-05 NOTE — PROGRESS NOTES
[facility]  Inpatient Progress Note    Reason for Follow Up: Cirrhosis, encephalopathy    Subjective     Interval History:   Complains of right shoulder pain.  No abdominal pain.  There is some report of nausea and vomiting yesterday and he was changed to clear liquid diet.  There was no witnessed vomiting by nursing.  Patient now requesting food    Current Facility-Administered Medications:   •  acetaminophen (TYLENOL) tablet 650 mg, 650 mg, Oral, Q4H PRN **OR** acetaminophen (TYLENOL) 160 MG/5ML solution 650 mg, 650 mg, Oral, Q4H PRN **OR** acetaminophen (TYLENOL) suppository 650 mg, 650 mg, Rectal, Q4H PRN, Foreign Wilkins MD  •  allopurinol (ZYLOPRIM) tablet 100 mg, 100 mg, Oral, Daily, Foreign Wilkins MD, 100 mg at 08/04/22 0858  •  cadexomer iodine (IODOSORB) 0.9 % gel 1 application, 1 application, Topical, Once per day on Mon Thu, Seferino Van MD, 1 application at 08/04/22 0859  •  carvedilol (COREG) tablet 12.5 mg, 12.5 mg, Oral, BID With Meals, oFreign Wilkins MD, 12.5 mg at 08/04/22 1712  •  citalopram (CeleXA) tablet 20 mg, 20 mg, Oral, Daily, Foreign Wilkins MD, 20 mg at 08/04/22 0858  •  LORazepam (ATIVAN) tablet 1 mg, 1 mg, Oral, Q2H PRN, 1 mg at 08/02/22 0216 **OR** diazePAM (VALIUM) injection 5 mg, 5 mg, Intravenous, Q2H PRN, 5 mg at 08/03/22 0333 **OR** LORazepam (ATIVAN) tablet 2 mg, 2 mg, Oral, Q1H PRN **OR** diazePAM (VALIUM) injection 10 mg, 10 mg, Intravenous, Q1H PRN, 10 mg at 08/02/22 2102 **OR** diazePAM (VALIUM) injection 10 mg, 10 mg, Intravenous, Q30 Min PRN, Foreign Wilkins MD, 10 mg at 08/03/22 0504  •  multivitamin (THERAGRAN) tablet 1 tablet, 1 tablet, Oral, Daily, 1 tablet at 08/04/22 0858 **AND** folic acid (FOLVITE) tablet 1 mg, 1 mg, Oral, Daily, Seferino Van MD, 1 mg at 08/04/22 0858  •  HYDROmorphone (DILAUDID) injection 0.5 mg, 0.5 mg, Intravenous, Q4H PRN, Seferino Van MD  •  lactulose (CHRONULAC) 10  GM/15ML solution 30 g, 30 g, Oral, TID, Seferino Van MD, 30 g at 08/04/22 2002  •  Magnesium Sulfate 2 gram Bolus, followed by 8 gram infusion (total Mg dose 10 grams)- Mg less than or equal to 1mg/dL, 2 g, Intravenous, PRN **OR** Magnesium Sulfate 2 gram / 50mL Infusion (GIVE X 3 BAGS TO EQUAL 6GM TOTAL DOSE) - Mg 1.1 - 1.5 mg/dl, 2 g, Intravenous, PRN, Last Rate: 25 mL/hr at 08/04/22 0329, 2 g at 08/04/22 0329 **OR** Magnesium Sulfate 4 gram infusion- Mg 1.6-1.9 mg/dL, 4 g, Intravenous, PRN, Sanjay Diane APRN  •  nitroglycerin (NITROSTAT) SL tablet 0.4 mg, 0.4 mg, Sublingual, Q5 Min PRN, Foreign Wilkins MD  •  ondansetron (ZOFRAN) tablet 4 mg, 4 mg, Oral, Q6H PRN, 4 mg at 08/02/22 0316 **OR** ondansetron (ZOFRAN) injection 4 mg, 4 mg, Intravenous, Q6H PRN, Foreign Wilkins MD, 4 mg at 08/01/22 0345  •  oxyCODONE (ROXICODONE) immediate release tablet 5 mg, 5 mg, Oral, Q4H PRN, 5 mg at 08/04/22 2127 **OR** oxyCODONE (ROXICODONE) immediate release tablet 10 mg, 10 mg, Oral, Q4H PRN, Seferino Van MD, 10 mg at 08/05/22 0652  •  pantoprazole (PROTONIX) EC tablet 40 mg, 40 mg, Oral, QAM, Foreign Wilkins MD, 40 mg at 08/05/22 0650  •  potassium & sodium phosphates (PHOS-NAK) 280-160-250 MG packet - for Phosphorus less than 1.25 mg/dL, 2 packet, Oral, Q6H PRN **OR** potassium & sodium phosphates (PHOS-NAK) 280-160-250 MG packet - for Phosphorus 1.25 - 2.5 mg/dL, 2 packet, Oral, Q6H PRN, Seferino Van MD, 2 packet at 08/04/22 1712  •  potassium chloride (K-DUR,KLOR-CON) ER tablet 40 mEq, 40 mEq, Oral, PRN **OR** potassium chloride (KLOR-CON) packet 40 mEq, 40 mEq, Oral, PRN **OR** potassium chloride 10 mEq in 100 mL IVPB, 10 mEq, Intravenous, Q1H PRN, Seferino Van MD  •  riFAXIMin (XIFAXAN) tablet 550 mg, 550 mg, Oral, Q12H, Foreign Wilkins MD, 550 mg at 08/04/22 2003  •  [COMPLETED] Insert peripheral IV, , , Once **AND** sodium chloride 0.9 % flush 10 mL, 10 mL,  Intravenous, PRN, Tomasz Robles MD  •  sodium chloride 0.9 % flush 10 mL, 10 mL, Intravenous, Q12H, Foreign Wilkins MD, 10 mL at 08/04/22 2003  •  sodium chloride 0.9 % flush 10 mL, 10 mL, Intravenous, PRN, Foreign Wilkins MD  •  sodium chloride 0.9 % infusion, 100 mL/hr, Intravenous, Continuous, Seferino Van MD, Last Rate: 100 mL/hr at 08/04/22 1712, 100 mL/hr at 08/04/22 1712  •  thiamine (VITAMIN B-1) tablet 100 mg, 100 mg, Oral, Daily, Seferino Van MD, 100 mg at 08/04/22 0858  •  zinc sulfate (ZINCATE) capsule 220 mg, 220 mg, Oral, Daily, Foreign Wilkins MD, 220 mg at 08/04/22 0859  Review of Systems:    The following systems were reviewed and negative;  constitution and gastrointestinal    Objective     Vital Signs  Temp:  [98.5 °F (36.9 °C)-100 °F (37.8 °C)] 99.7 °F (37.6 °C)  Heart Rate:  [68-79] 74  Resp:  [16-18] 18  BP: (113-148)/(69-89) 113/69  Body mass index is 28.55 kg/m².    Intake/Output Summary (Last 24 hours) at 8/5/2022 0747  Last data filed at 8/5/2022 0444  Gross per 24 hour   Intake 1464 ml   Output 1225 ml   Net 239 ml     No intake/output data recorded.     Physical Exam:   General: patient awake, alert and cooperative   Eyes: Normal lids and lashes, no scleral icterus   Neck: supple, normal ROM   Skin: warm and dry, not jaundiced   Pulm:   regular and unlabored   Abdomen: soft, nontender, nondistended    Extremities: no rash or edema   Psychiatric: Normal mood and behavior; memory intact     Results Review:     I reviewed the patient's new clinical results.    Results from last 7 days   Lab Units 08/04/22  0833 08/03/22  0707 08/02/22  0637   WBC 10*3/mm3 5.79 5.58 4.42   HEMOGLOBIN g/dL 9.9* 10.2* 10.1*   HEMATOCRIT % 28.6* 29.9* 29.0*   PLATELETS 10*3/mm3 82* 86* 90*     Results from last 7 days   Lab Units 08/04/22  0857 08/03/22  0707 08/02/22  0637 08/01/22  1000   SODIUM mmol/L 127* 137 138 141   POTASSIUM mmol/L 3.8 4.0 3.7 4.1   CHLORIDE mmol/L 102 109*  111* 111*   CO2 mmol/L 16.7* 16.0* 14.7* 14.7*   BUN mg/dL 10 14 22* 30*   CREATININE mg/dL 0.69* 1.00 1.64* 2.45*   CALCIUM mg/dL 8.5* 8.2* 8.4* 9.2   BILIRUBIN mg/dL 1.0  --  0.6 0.8   ALK PHOS U/L 96  --  75 77   ALT (SGPT) U/L 15  --  10 10   AST (SGOT) U/L 42*  --  27 28   GLUCOSE mg/dL 87 86 90 95     Results from last 7 days   Lab Units 08/03/22  0707 08/02/22  0637 08/01/22  1000   INR  1.27* 1.30* 1.28*     Lab Results   Lab Value Date/Time    LIPASE 609 (H) 08/04/2022 0857       Radiology:  US Gallbladder   Final Result   Multiple gallstones within the gallbladder. No appreciable wall   thickening or pericholecystic fluid       This report was finalized on 8/3/2022 7:17 PM by Dr. Aaron Novak M.D.          CT Abdomen Pelvis Without Contrast   Final Result   1. Cholelithiasis with gallbladder wall thickening somewhat limited by   subjacent motion. Although findings may be the sequela of hepatic   dysfunction, recommend ultrasound and/or HIDA scan if there is clinical   concern for cholecystitis.   2. Morphologic changes of cirrhosis with portal hypertension with   splenomegaly and portosystemic shunting as described above.   3. Nonobstructing nephrolithiasis, left greater than right.   4. Please see above for additional findings/recommendations.       This report was finalized on 8/3/2022 11:44 AM by Dr. Carlos Alberto De Anda M.D.          CT Head Without Contrast   Final Result   No acute process identified. Further evaluation could be   performed with an MRI examination of the brain as indicated.       Radiation dose reduction techniques were utilized, including automated   exposure control and exposure modulation based on body size.       This report was finalized on 8/1/2022 7:10 AM by Dr. Juan Boswell M.D.          CT Abdomen Pelvis Without Contrast   Final Result         Electronically signed by Donal Vigil MD on 07-31-22 at 1923      XR Chest 1 View   Final Result          Assessment & Plan      Patient Active Problem List   Diagnosis   • Iron deficiency anemia   • Hepatic cirrhosis (HCC)   • Screening for malignant neoplasm of colon   • Abdominal aortic aneurysm (AAA) (HCC)   • Hepatitis C virus infection   • Gout   • Hypercholesterolemia   • Hepatic cirrhosis (HCC)   • Thoracic aortic aneurysm without rupture (HCC)   • Hypertensive disorder   • Hip pain   • Chronic pain   • Bicuspid aortic valve   • Arthritis of right hip   • Aortic valve regurgitation   • Anxiety   • Abdominal aortic aneurysm (AAA) (HCC)   • Hepatic cirrhosis (HCC)   • Hepatitis C virus infection   • Chronic pain   • Hepatic encephalopathy (HCC)   • Alcoholic cirrhosis of liver without ascites (HCC)   • YANNI (acute kidney injury) (HCC)   • Hypomagnesemia   • Tophaceous gout       Assessment:  1. Hepatic encephalopathy, withdrawal may be contributing acute confusion  2. Cirrhosis with history of alcohol abuse as well as hepatitis C  3. Anemia, no active bleeding  4. YANNI, resolved  5. Hyponatremia  6. Cholelithiasis      Plan:  · No acute findings on his CT scan to account for abdominal pain.  Possibly some element of chronic pancreatitis per surgery as he has risk factors.  He does have cholelithiasis.  He is not a good candidate for cholecystectomy due to his chronic liver disease.  Continue to manage conservatively  · Continue 2 g sodium diet  · Continue lactulose titrated to achieve 3-4 bowel movements daily, continue Xifaxan  · Recommend EGD prior to discharge to assess for varices once mental status has improved  · Okay to resume low-sodium low-fat diet given      I discussed the patients findings and my recommendations with patient and nursing staff.    Chantell Padilla MD

## 2022-08-05 NOTE — PLAN OF CARE
"Goal Outcome Evaluation:  Plan of Care Reviewed With: patient        Progress: no change  Outcome Evaluation: Pt admitted to [facility] on 8/5 with the above diagnosis. Pt slightly agitated when doing bed mobility. Increased time and encouragment needed. Pt screams in pain when you try and move him. Pt contracted on BUE shoulder and elbows. Decrease strength in shoulders, elbows. wrist and hands. Pt sat at EOB but refused to stand this go around stating \"ill do it tomorrow.\" Skilled OT services warranted to increase strength, ROM, and endurance for increased ind. with ADLs.  "

## 2022-08-05 NOTE — PROGRESS NOTES
Chief Complaint:    Cholelithiasis    Subjective:    The patient is somewhat somnolent appearing but arouses and answers questions.  He is no longer having abdominal pain nor any nausea or vomiting.  He is tolerating a diet.    Objective:    Temp:  [98.5 °F (36.9 °C)-100 °F (37.8 °C)] 99.7 °F (37.6 °C)  Heart Rate:  [74-79] 74  Resp:  [18] 18  BP: (113-148)/(69-89) 113/69    Physical Exam  Constitutional:       Appearance: He is not ill-appearing.   Abdominal:      Palpations: Abdomen is soft.      Tenderness: There is no abdominal tenderness.   Psychiatric:         Behavior: Behavior is cooperative.         Results:    WBC is 8.21.  H/H is 10.1/20.9.  ALT is 16, AST is 52, alkaline phosphatase is 109, total bilirubin is 1.4.  Lipase is 375.    Assessment/Plan:    The patient has alcoholic pancreatitis that is clinically improving.  His abdominal pain has resolved and he is no longer having nausea or vomiting.  The lipase is decreased to 375.  He is currently tolerating a diet.  I will leave management up to gastroenterology.    The patient has gallstones but no evidence of cholecystitis.  His pancreatitis is most consistent with alcoholic pancreatitis and not gallstone pancreatitis.  There are no plans for cholecystectomy at this time.    I will sign off but remain available if needed.    Casey Avendano Jr., M.D.

## 2022-08-06 LAB
ALBUMIN SERPL-MCNC: 3 G/DL (ref 3.5–5.2)
ALBUMIN/GLOB SERPL: 1.1 G/DL
ALP SERPL-CCNC: 119 U/L (ref 39–117)
ALT SERPL W P-5'-P-CCNC: 22 U/L (ref 1–41)
AMMONIA BLD-SCNC: 68 UMOL/L (ref 16–60)
ANION GAP SERPL CALCULATED.3IONS-SCNC: 11 MMOL/L (ref 5–15)
AST SERPL-CCNC: 67 U/L (ref 1–40)
BILIRUB SERPL-MCNC: 1.5 MG/DL (ref 0–1.2)
BUN SERPL-MCNC: 9 MG/DL (ref 6–20)
BUN/CREAT SERPL: 14.8 (ref 7–25)
CALCIUM SPEC-SCNC: 8 MG/DL (ref 8.6–10.5)
CHLORIDE SERPL-SCNC: 101 MMOL/L (ref 98–107)
CO2 SERPL-SCNC: 18 MMOL/L (ref 22–29)
CREAT SERPL-MCNC: 0.61 MG/DL (ref 0.76–1.27)
DEPRECATED RDW RBC AUTO: 45.1 FL (ref 37–54)
EGFRCR SERPLBLD CKD-EPI 2021: 116.3 ML/MIN/1.73
ERYTHROCYTE [DISTWIDTH] IN BLOOD BY AUTOMATED COUNT: 14.1 % (ref 12.3–15.4)
GLOBULIN UR ELPH-MCNC: 2.7 GM/DL
GLUCOSE SERPL-MCNC: 102 MG/DL (ref 65–99)
HCT VFR BLD AUTO: 27.5 % (ref 37.5–51)
HGB BLD-MCNC: 9.7 G/DL (ref 13–17.7)
MAGNESIUM SERPL-MCNC: 1.7 MG/DL (ref 1.6–2.6)
MCH RBC QN AUTO: 31.4 PG (ref 26.6–33)
MCHC RBC AUTO-ENTMCNC: 35.3 G/DL (ref 31.5–35.7)
MCV RBC AUTO: 89 FL (ref 79–97)
PHOSPHATE SERPL-MCNC: 2.1 MG/DL (ref 2.5–4.5)
PHOSPHATE SERPL-MCNC: 2.1 MG/DL (ref 2.5–4.5)
PHOSPHATE SERPL-MCNC: 2.8 MG/DL (ref 2.5–4.5)
PLATELET # BLD AUTO: 89 10*3/MM3 (ref 140–450)
PMV BLD AUTO: 11.3 FL (ref 6–12)
POTASSIUM SERPL-SCNC: 3.9 MMOL/L (ref 3.5–5.2)
PROT SERPL-MCNC: 5.7 G/DL (ref 6–8.5)
RBC # BLD AUTO: 3.09 10*6/MM3 (ref 4.14–5.8)
SARS-COV-2 RNA RESP QL NAA+PROBE: NOT DETECTED
SODIUM SERPL-SCNC: 130 MMOL/L (ref 136–145)
WBC NRBC COR # BLD: 10.18 10*3/MM3 (ref 3.4–10.8)

## 2022-08-06 PROCEDURE — 99232 SBSQ HOSP IP/OBS MODERATE 35: CPT | Performed by: INTERNAL MEDICINE

## 2022-08-06 PROCEDURE — 83735 ASSAY OF MAGNESIUM: CPT | Performed by: INTERNAL MEDICINE

## 2022-08-06 PROCEDURE — U0003 INFECTIOUS AGENT DETECTION BY NUCLEIC ACID (DNA OR RNA); SEVERE ACUTE RESPIRATORY SYNDROME CORONAVIRUS 2 (SARS-COV-2) (CORONAVIRUS DISEASE [COVID-19]), AMPLIFIED PROBE TECHNIQUE, MAKING USE OF HIGH THROUGHPUT TECHNOLOGIES AS DESCRIBED BY CMS-2020-01-R: HCPCS | Performed by: INTERNAL MEDICINE

## 2022-08-06 PROCEDURE — 80053 COMPREHEN METABOLIC PANEL: CPT | Performed by: INTERNAL MEDICINE

## 2022-08-06 PROCEDURE — 84100 ASSAY OF PHOSPHORUS: CPT | Performed by: INTERNAL MEDICINE

## 2022-08-06 PROCEDURE — 85027 COMPLETE CBC AUTOMATED: CPT | Performed by: INTERNAL MEDICINE

## 2022-08-06 PROCEDURE — 82140 ASSAY OF AMMONIA: CPT | Performed by: INTERNAL MEDICINE

## 2022-08-06 RX ORDER — OXYCODONE HYDROCHLORIDE 5 MG/1
10 TABLET ORAL EVERY 6 HOURS PRN
Status: DISCONTINUED | OUTPATIENT
Start: 2022-08-06 | End: 2022-08-07

## 2022-08-06 RX ORDER — OXYCODONE HYDROCHLORIDE 5 MG/1
5 TABLET ORAL EVERY 6 HOURS PRN
Status: DISCONTINUED | OUTPATIENT
Start: 2022-08-06 | End: 2022-08-07

## 2022-08-06 RX ADMIN — CARVEDILOL 12.5 MG: 12.5 TABLET, FILM COATED ORAL at 08:25

## 2022-08-06 RX ADMIN — Medication 10 ML: at 21:59

## 2022-08-06 RX ADMIN — Medication 100 MG: at 08:25

## 2022-08-06 RX ADMIN — Medication 220 MG: at 08:25

## 2022-08-06 RX ADMIN — Medication 2 PACKET: at 04:00

## 2022-08-06 RX ADMIN — Medication 10 ML: at 08:25

## 2022-08-06 RX ADMIN — RIFAXIMIN 550 MG: 550 TABLET ORAL at 21:59

## 2022-08-06 RX ADMIN — LACTULOSE 30 G: 10 SOLUTION ORAL at 17:17

## 2022-08-06 RX ADMIN — RIFAXIMIN 550 MG: 550 TABLET ORAL at 08:25

## 2022-08-06 RX ADMIN — LACTULOSE 30 G: 10 SOLUTION ORAL at 23:43

## 2022-08-06 RX ADMIN — OXYCODONE 10 MG: 5 TABLET ORAL at 01:53

## 2022-08-06 RX ADMIN — LACTULOSE 30 G: 10 SOLUTION ORAL at 08:25

## 2022-08-06 RX ADMIN — Medication 1 TABLET: at 08:25

## 2022-08-06 RX ADMIN — Medication 1 MG: at 08:25

## 2022-08-06 RX ADMIN — ALLOPURINOL 100 MG: 100 TABLET ORAL at 08:25

## 2022-08-06 RX ADMIN — FAMOTIDINE 20 MG: 20 TABLET ORAL at 06:45

## 2022-08-06 RX ADMIN — CITALOPRAM 20 MG: 20 TABLET, FILM COATED ORAL at 08:25

## 2022-08-06 RX ADMIN — Medication 2 PACKET: at 13:59

## 2022-08-06 RX ADMIN — CARVEDILOL 12.5 MG: 12.5 TABLET, FILM COATED ORAL at 17:17

## 2022-08-06 RX ADMIN — FAMOTIDINE 20 MG: 20 TABLET ORAL at 17:17

## 2022-08-06 NOTE — PROGRESS NOTES
[facility]  Inpatient Progress Note    Reason for Followup:  Cirrhosis    Subjective     Interval History:   No new issues overnight     Current Facility-Administered Medications:   •  acetaminophen (TYLENOL) tablet 650 mg, 650 mg, Oral, Q4H PRN **OR** acetaminophen (TYLENOL) 160 MG/5ML solution 650 mg, 650 mg, Oral, Q4H PRN **OR** acetaminophen (TYLENOL) suppository 650 mg, 650 mg, Rectal, Q4H PRN, Foreign Wilkins MD  •  allopurinol (ZYLOPRIM) tablet 100 mg, 100 mg, Oral, Daily, Foreign Wilkins MD, 100 mg at 08/05/22 0827  •  cadexomer iodine (IODOSORB) 0.9 % gel 1 application, 1 application, Topical, Once per day on Mon Thu, Seferino Van MD, 1 application at 08/04/22 0859  •  carvedilol (COREG) tablet 12.5 mg, 12.5 mg, Oral, BID With Meals, Foreign Wilkins MD, 12.5 mg at 08/05/22 1725  •  citalopram (CeleXA) tablet 20 mg, 20 mg, Oral, Daily, Foreign Wilkins MD, 20 mg at 08/05/22 0827  •  LORazepam (ATIVAN) tablet 1 mg, 1 mg, Oral, Q2H PRN, 1 mg at 08/02/22 0216 **OR** diazePAM (VALIUM) injection 5 mg, 5 mg, Intravenous, Q2H PRN, 5 mg at 08/03/22 0333 **OR** LORazepam (ATIVAN) tablet 2 mg, 2 mg, Oral, Q1H PRN **OR** diazePAM (VALIUM) injection 10 mg, 10 mg, Intravenous, Q1H PRN, 10 mg at 08/02/22 2102 **OR** diazePAM (VALIUM) injection 10 mg, 10 mg, Intravenous, Q30 Min PRN, Foreign Wiklins MD, 10 mg at 08/03/22 0504  •  famotidine (PEPCID) tablet 20 mg, 20 mg, Oral, BID AC, Seferino Van MD, 20 mg at 08/05/22 1725  •  multivitamin (THERAGRAN) tablet 1 tablet, 1 tablet, Oral, Daily, 1 tablet at 08/05/22 0827 **AND** folic acid (FOLVITE) tablet 1 mg, 1 mg, Oral, Daily, Seferino Van MD, 1 mg at 08/05/22 0827  •  HYDROmorphone (DILAUDID) injection 0.25 mg, 0.25 mg, Intravenous, Q4H PRN, Seferino Van MD  •  lactulose (CHRONULAC) 10 GM/15ML solution 30 g, 30 g, Oral, TID, Seferino Van MD, 30 g at 08/05/22 2111  •  Magnesium Sulfate 2  gram Bolus, followed by 8 gram infusion (total Mg dose 10 grams)- Mg less than or equal to 1mg/dL, 2 g, Intravenous, PRN **OR** Magnesium Sulfate 2 gram / 50mL Infusion (GIVE X 3 BAGS TO EQUAL 6GM TOTAL DOSE) - Mg 1.1 - 1.5 mg/dl, 2 g, Intravenous, PRN, Last Rate: 25 mL/hr at 08/05/22 1725, 2 g at 08/05/22 1725 **OR** Magnesium Sulfate 4 gram infusion- Mg 1.6-1.9 mg/dL, 4 g, Intravenous, PRN, Sanjay Diane, APRN  •  nitroglycerin (NITROSTAT) SL tablet 0.4 mg, 0.4 mg, Sublingual, Q5 Min PRN, Foreign Wilkins MD  •  ondansetron (ZOFRAN) tablet 4 mg, 4 mg, Oral, Q6H PRN, 4 mg at 08/02/22 0316 **OR** ondansetron (ZOFRAN) injection 4 mg, 4 mg, Intravenous, Q6H PRN, Foreign Wilkins MD, 4 mg at 08/01/22 0345  •  oxyCODONE (ROXICODONE) immediate release tablet 5 mg, 5 mg, Oral, Q4H PRN, 5 mg at 08/05/22 1242 **OR** oxyCODONE (ROXICODONE) immediate release tablet 10 mg, 10 mg, Oral, Q4H PRN, Seferino Van MD, 10 mg at 08/06/22 0153  •  potassium & sodium phosphates (PHOS-NAK) 280-160-250 MG packet - for Phosphorus less than 1.25 mg/dL, 2 packet, Oral, Q6H PRN **OR** potassium & sodium phosphates (PHOS-NAK) 280-160-250 MG packet - for Phosphorus 1.25 - 2.5 mg/dL, 2 packet, Oral, Q6H PRN, Seferino Van MD, 2 packet at 08/06/22 0400  •  potassium chloride (K-DUR,KLOR-CON) ER tablet 40 mEq, 40 mEq, Oral, PRN **OR** potassium chloride (KLOR-CON) packet 40 mEq, 40 mEq, Oral, PRN **OR** potassium chloride 10 mEq in 100 mL IVPB, 10 mEq, Intravenous, Q1H PRN, Seferino Van MD  •  riFAXIMin (XIFAXAN) tablet 550 mg, 550 mg, Oral, Q12H, Foreign Wilkins MD, 550 mg at 08/05/22 2111  •  [COMPLETED] Insert peripheral IV, , , Once **AND** sodium chloride 0.9 % flush 10 mL, 10 mL, Intravenous, PRN, Tomasz Robles MD  •  sodium chloride 0.9 % flush 10 mL, 10 mL, Intravenous, Q12H, Foreign Wilkins MD, 10 mL at 08/05/22 2111  •  sodium chloride 0.9 % flush 10 mL, 10 mL, Intravenous, PRN, Foreign Wilkins  MD Tj  •  thiamine (VITAMIN B-1) tablet 100 mg, 100 mg, Oral, Daily, Seferino Van MD, 100 mg at 08/05/22 0827  •  zinc sulfate (ZINCATE) capsule 220 mg, 220 mg, Oral, Daily, Foreign Wilkins MD, 220 mg at 08/05/22 0827  Review of Systems:    The following systems were reviewed and negative;  gastrointestinal    Objective     Vital Signs  Temp:  [98.5 °F (36.9 °C)-99.7 °F (37.6 °C)] 99.3 °F (37.4 °C)  Heart Rate:  [70-74] 73  Resp:  [18] 18  BP: (113-142)/(69-89) 126/81  Body mass index is 28.55 kg/m².    Intake/Output Summary (Last 24 hours) at 8/6/2022 0615  Last data filed at 8/6/2022 0500  Gross per 24 hour   Intake 960 ml   Output 875 ml   Net 85 ml     I/O this shift:  In: 560 [P.O.:560]  Out: 725 [Urine:725]     Physical Exam:   General: patient awake, alert and cooperative   Abdomen: soft, nontender, nondistended; normal bowel sounds   Rectal: deferred   Extremities: no rash or edema   Psychiatric: Normal mood and behavior; memory intact     Results Review:     I reviewed the patient's new clinical results.    Results from last 7 days   Lab Units 08/06/22  0305 08/05/22  1058 08/04/22  0833   WBC 10*3/mm3 10.18 8.21 5.79   HEMOGLOBIN g/dL 9.7* 10.1* 9.9*   HEMATOCRIT % 27.5* 28.9* 28.6*   PLATELETS 10*3/mm3 89* 87* 82*     Results from last 7 days   Lab Units 08/06/22  0304 08/05/22  1058 08/04/22  0857   SODIUM mmol/L 130* 128* 127*   POTASSIUM mmol/L 3.9 3.9 3.8   CHLORIDE mmol/L 101 101 102   CO2 mmol/L 18.0* 17.0* 16.7*   BUN mg/dL 9 8 10   CREATININE mg/dL 0.61* 0.69* 0.69*   CALCIUM mg/dL 8.0* 8.0* 8.5*   BILIRUBIN mg/dL 1.5* 1.4* 1.0   ALK PHOS U/L 119* 109 96   ALT (SGPT) U/L 22 16 15   AST (SGOT) U/L 67* 52* 42*   GLUCOSE mg/dL 102* 108* 87     Results from last 7 days   Lab Units 08/03/22  0707 08/02/22  0637 08/01/22  1000   INR  1.27* 1.30* 1.28*     Lab Results   Lab Value Date/Time    LIPASE 375 (H) 08/05/2022 1058    LIPASE 609 (H) 08/04/2022 0857        Radiology:  [unfilled]      Assessment & Plan   Assessment:   1. Hepatic encephalopathy, withdrawal may be contributing acute confusion  2. Cirrhosis with history of alcohol abuse as well as hepatitis C  3. Anemia, no active bleeding  4. Cholelithiasis    All problems new to me today    Plan:   Continue lactulose/Xifaxin for PSE  2gm low NA diet  General surgery note reviewed, not a good candidate for CCY  Plan EGD tomorrow to evaluate for esophageal varices given patient nearing hospital discharge      I discussed the patient's findings and my recommendations with patient.          Wyatt Perdue M.D.  [facility]  [address] [place] [address]  Office: [phone]

## 2022-08-06 NOTE — PROGRESS NOTES
Name: Macario Carpio ADMIT: 2022   : 1970  PCP: KenanAndrew, APRN    MRN: [id number] LOS: 6 days   AGE/SEX: 51 y.o. male  ROOM: [facility]   Subjective   Chief Complaint   Patient presents with   • Altered Mental Status      Drowsy but will awaken.  He reports abd pain is improving. No CP SOA NVD reported. Peripheral gout pain present.    Objective   Vital Signs  Temp:  [98.5 °F (36.9 °C)-99.3 °F (37.4 °C)] 99.1 °F (37.3 °C)  Heart Rate:  [70-90] 90  Resp:  [18] 18  BP: (126-174)/() 174/107  SpO2:  [96 %-97 %] 97 %  on   ;   Device (Oxygen Therapy): room air  Body mass index is 28.55 kg/m².    Physical Exam  Vitals and nursing note reviewed.   Constitutional:       Appearance: He is ill-appearing (chronically). He is not diaphoretic.   HENT:      Head: Normocephalic and atraumatic.   Eyes:      General:         Right eye: No discharge.         Left eye: No discharge.      Conjunctiva/sclera: Conjunctivae normal.   Cardiovascular:      Rate and Rhythm: Normal rate and regular rhythm.      Pulses: Normal pulses.   Pulmonary:      Effort: Pulmonary effort is normal.      Breath sounds: Decreased breath sounds and rhonchi present. No wheezing.   Abdominal:      General: There is no distension.      Palpations: Abdomen is soft.      Tenderness: There is abdominal tenderness. There is no guarding or rebound.   Musculoskeletal:         General: Swelling, tenderness and deformity present.      Cervical back: Neck supple. No tenderness.      Right lower leg: No edema.      Left lower leg: No edema.      Comments: Diffuse tophaceous gout. R toe prior amputation and chronic wound dressed.   Skin:     General: Skin is warm and dry.   Neurological:      Mental Status: He is disoriented.   Psychiatric:         Cognition and Memory: Cognition is impaired. Memory is impaired.         Results Review:       I reviewed the patient's new clinical results.          Results from last 7 days   Lab Units  08/06/22  0305 08/05/22  1058 08/04/22  0833 08/03/22  0707   WBC 10*3/mm3 10.18 8.21 5.79 5.58   HEMOGLOBIN g/dL 9.7* 10.1* 9.9* 10.2*   PLATELETS 10*3/mm3 89* 87* 82* 86*     Results from last 7 days   Lab Units 08/06/22  0304 08/05/22  1058 08/04/22  0857 08/03/22  0707   SODIUM mmol/L 130* 128* 127* 137   POTASSIUM mmol/L 3.9 3.9 3.8 4.0   CHLORIDE mmol/L 101 101 102 109*   CO2 mmol/L 18.0* 17.0* 16.7* 16.0*   BUN mg/dL 9 8 10 14   CREATININE mg/dL 0.61* 0.69* 0.69* 1.00   GLUCOSE mg/dL 102* 108* 87 86   Estimated Creatinine Clearance: 166.8 mL/min (A) (by C-G formula based on SCr of 0.61 mg/dL (L)).  Results from last 7 days   Lab Units 08/06/22  1039 08/06/22  0304 08/05/22  1958 08/05/22  1541 08/05/22  1058 08/04/22  0857 08/03/22  2110 08/03/22  0707   CALCIUM mg/dL  --  8.0*  --   --  8.0* 8.5*  --  8.2*   ALBUMIN g/dL  --  3.00*  --   --  2.70* 2.90*  --  3.20*   MAGNESIUM mg/dL  --  1.7  --   --  1.1* 2.0 1.1* 1.1*   PHOSPHORUS mg/dL 2.1* 2.1* 2.0* 2.2* 1.9* 2.3*  --  2.7     Results from last 7 days   Lab Units 08/03/22  0707 08/02/22  0637 08/01/22  1000   INR  1.27* 1.30* 1.28*        allopurinol, 100 mg, Oral, Daily  cadexomer iodine, 1 application, Topical, Once per day on Mon Thu  carvedilol, 12.5 mg, Oral, BID With Meals  citalopram, 20 mg, Oral, Daily  famotidine, 20 mg, Oral, BID AC  multivitamin, 1 tablet, Oral, Daily   And  folic acid, 1 mg, Oral, Daily  lactulose, 30 g, Oral, TID  rifAXIMin, 550 mg, Oral, Q12H  sodium chloride, 10 mL, Intravenous, Q12H  thiamine, 100 mg, Oral, Daily  zinc sulfate, 220 mg, Oral, Daily       Diet Regular; Low Fat  NPO Diet NPO Type: Strict NPO    Assessment & Plan      Active Hospital Problems    Diagnosis  POA   • **Hepatic encephalopathy (HCC) [K72.90]  Yes   • Tophaceous gout [M1A.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       · Hepatic encephalopathy/alcoholic cirrhosis: Continue lactulose and Xifaxan.  Planned EGD.  GI following.  · Pancreatitis: Gallstones noted.  No acute cholecystitis on ultrasound.  Surgery evaluated.  Diet advanced. Weaning pain medications due to drowsiness.  · YANNI on CKD: Improved.  Nephrology evaluated.  · Alcohol abuse/HCV: HCV viral load was not detected.  See above. Vitamin supplementation.  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  Continue allopurinol.  Alcohol abstinence will be important in controlling this disease progression.  · Low magnesium secondary to alcohol abuse: Continue replacement as needed  · Low phosphorus: Replace  · Hyponatremia: Improving. Gi would like low sodium diet so will also place on fluid restriction. Monitor.  · Hypertension: Elevated  · Chronic wound right foot: Dressed.  Wound care following.  · Prophylaxis: SCD.  Platelet count stabilizing.  · Disposition: HH v SNF/few more days anticipated    Seferino Van MD  [facility]  08/06/22  14:13 EDT    Dictated portions using Dragon dictation software.    During the entire encounter, I was wearing recommended PPE including face mask and eye protection. Hand sanitization was performed prior to entering room and upon exit.

## 2022-08-06 NOTE — PLAN OF CARE
Goal Outcome Evaluation:  Plan of Care Reviewed With: patient  Progress: no change  Outcome Evaluation: All needs met. Updated wife. Encouraged PT participation and importance of mobility. Q2H turns w/ assist x 2. Regular low fat diet. Urinal. VSS. Disoriented to time only. Remains on RA. NSR. Oxycodone Q4H PRN. Potassium replacement. WCTM.

## 2022-08-06 NOTE — PLAN OF CARE
Patient remains lethargic. Slept most of the shift. MD aware. Checking ammonia level. Patient will arouse to voice. Remains A & O x4 this shift. Occasionally disoriented to time. No new complaints. 3 BM's this shift. HTN this shift. Meds given per orders. On RA.         Problem: Adult Inpatient Plan of Care  Goal: Plan of Care Review  Outcome: Ongoing, Progressing  Flowsheets  Taken 8/6/2022 1814 by Rose Mary Novak, RN  Progress: no change  Taken 8/6/2022 0416 by Nola Atkinson, RN  Plan of Care Reviewed With: patient   Goal Outcome Evaluation:           Progress: no change

## 2022-08-06 NOTE — PROGRESS NOTES
"Access did follow-up on patient.  Patient appeared groggy.  Patient rated both depression and anxiety as a \"0\".  Patient has resources for [facility] services at discharge.  Patient's CIWA currently is a 1.  Access will continue to follow.  "

## 2022-08-07 ENCOUNTER — ANESTHESIA (OUTPATIENT)
Dept: GASTROENTEROLOGY | Facility: HOSPITAL | Age: 52
End: 2022-08-07

## 2022-08-07 ENCOUNTER — ANESTHESIA EVENT (OUTPATIENT)
Dept: GASTROENTEROLOGY | Facility: HOSPITAL | Age: 52
End: 2022-08-07

## 2022-08-07 LAB
ALBUMIN SERPL-MCNC: 2.7 G/DL (ref 3.5–5.2)
ALBUMIN/GLOB SERPL: 0.9 G/DL
ALP SERPL-CCNC: 111 U/L (ref 39–117)
ALT SERPL W P-5'-P-CCNC: 22 U/L (ref 1–41)
ANION GAP SERPL CALCULATED.3IONS-SCNC: 12.4 MMOL/L (ref 5–15)
AST SERPL-CCNC: 56 U/L (ref 1–40)
BILIRUB SERPL-MCNC: 1.3 MG/DL (ref 0–1.2)
BUN SERPL-MCNC: 15 MG/DL (ref 6–20)
BUN/CREAT SERPL: 25 (ref 7–25)
CALCIUM SPEC-SCNC: 8.4 MG/DL (ref 8.6–10.5)
CHLORIDE SERPL-SCNC: 102 MMOL/L (ref 98–107)
CO2 SERPL-SCNC: 20.6 MMOL/L (ref 22–29)
CREAT SERPL-MCNC: 0.6 MG/DL (ref 0.76–1.27)
DEPRECATED RDW RBC AUTO: 44.8 FL (ref 37–54)
EGFRCR SERPLBLD CKD-EPI 2021: 116.9 ML/MIN/1.73
ERYTHROCYTE [DISTWIDTH] IN BLOOD BY AUTOMATED COUNT: 14 % (ref 12.3–15.4)
GLOBULIN UR ELPH-MCNC: 3 GM/DL
GLUCOSE SERPL-MCNC: 121 MG/DL (ref 65–99)
HCT VFR BLD AUTO: 26.8 % (ref 37.5–51)
HGB BLD-MCNC: 9.4 G/DL (ref 13–17.7)
MAGNESIUM SERPL-MCNC: 1.4 MG/DL (ref 1.6–2.6)
MCH RBC QN AUTO: 31.2 PG (ref 26.6–33)
MCHC RBC AUTO-ENTMCNC: 35.1 G/DL (ref 31.5–35.7)
MCV RBC AUTO: 89 FL (ref 79–97)
PHOSPHATE SERPL-MCNC: 2.2 MG/DL (ref 2.5–4.5)
PLATELET # BLD AUTO: 119 10*3/MM3 (ref 140–450)
PMV BLD AUTO: 11.4 FL (ref 6–12)
POTASSIUM SERPL-SCNC: 3.5 MMOL/L (ref 3.5–5.2)
PROT SERPL-MCNC: 5.7 G/DL (ref 6–8.5)
RBC # BLD AUTO: 3.01 10*6/MM3 (ref 4.14–5.8)
SODIUM SERPL-SCNC: 135 MMOL/L (ref 136–145)
WBC NRBC COR # BLD: 8.25 10*3/MM3 (ref 3.4–10.8)

## 2022-08-07 PROCEDURE — 84100 ASSAY OF PHOSPHORUS: CPT | Performed by: INTERNAL MEDICINE

## 2022-08-07 PROCEDURE — 88342 IMHCHEM/IMCYTCHM 1ST ANTB: CPT | Performed by: INTERNAL MEDICINE

## 2022-08-07 PROCEDURE — 25010000002 PROPOFOL 10 MG/ML EMULSION: Performed by: ANESTHESIOLOGY

## 2022-08-07 PROCEDURE — 83735 ASSAY OF MAGNESIUM: CPT | Performed by: INTERNAL MEDICINE

## 2022-08-07 PROCEDURE — 80053 COMPREHEN METABOLIC PANEL: CPT | Performed by: INTERNAL MEDICINE

## 2022-08-07 PROCEDURE — 43239 EGD BIOPSY SINGLE/MULTIPLE: CPT | Performed by: INTERNAL MEDICINE

## 2022-08-07 PROCEDURE — 43244 EGD VARICES LIGATION: CPT | Performed by: INTERNAL MEDICINE

## 2022-08-07 PROCEDURE — 88305 TISSUE EXAM BY PATHOLOGIST: CPT | Performed by: INTERNAL MEDICINE

## 2022-08-07 PROCEDURE — 0DB68ZX EXCISION OF STOMACH, VIA NATURAL OR ARTIFICIAL OPENING ENDOSCOPIC, DIAGNOSTIC: ICD-10-PCS | Performed by: INTERNAL MEDICINE

## 2022-08-07 PROCEDURE — 85027 COMPLETE CBC AUTOMATED: CPT | Performed by: INTERNAL MEDICINE

## 2022-08-07 PROCEDURE — 06L38CZ OCCLUSION OF ESOPHAGEAL VEIN WITH EXTRALUMINAL DEVICE, VIA NATURAL OR ARTIFICIAL OPENING ENDOSCOPIC: ICD-10-PCS | Performed by: INTERNAL MEDICINE

## 2022-08-07 RX ORDER — SODIUM CHLORIDE 9 MG/ML
1000 INJECTION, SOLUTION INTRAVENOUS CONTINUOUS
Status: ACTIVE | OUTPATIENT
Start: 2022-08-07 | End: 2022-08-09

## 2022-08-07 RX ORDER — LIDOCAINE HYDROCHLORIDE 10 MG/ML
0.5 INJECTION, SOLUTION INFILTRATION; PERINEURAL ONCE AS NEEDED
Status: DISCONTINUED | OUTPATIENT
Start: 2022-08-07 | End: 2022-08-07 | Stop reason: HOSPADM

## 2022-08-07 RX ORDER — OXYCODONE HYDROCHLORIDE 5 MG/1
5 TABLET ORAL EVERY 6 HOURS PRN
Status: DISCONTINUED | OUTPATIENT
Start: 2022-08-07 | End: 2022-08-12

## 2022-08-07 RX ORDER — PROPOFOL 10 MG/ML
VIAL (ML) INTRAVENOUS AS NEEDED
Status: DISCONTINUED | OUTPATIENT
Start: 2022-08-07 | End: 2022-08-07 | Stop reason: SURG

## 2022-08-07 RX ORDER — GLYCOPYRROLATE 0.2 MG/ML
INJECTION INTRAMUSCULAR; INTRAVENOUS AS NEEDED
Status: DISCONTINUED | OUTPATIENT
Start: 2022-08-07 | End: 2022-08-07 | Stop reason: SURG

## 2022-08-07 RX ORDER — SODIUM CHLORIDE 0.9 % (FLUSH) 0.9 %
10 SYRINGE (ML) INJECTION AS NEEDED
Status: DISCONTINUED | OUTPATIENT
Start: 2022-08-07 | End: 2022-08-07 | Stop reason: HOSPADM

## 2022-08-07 RX ORDER — LIDOCAINE HYDROCHLORIDE 20 MG/ML
INJECTION, SOLUTION INFILTRATION; PERINEURAL AS NEEDED
Status: DISCONTINUED | OUTPATIENT
Start: 2022-08-07 | End: 2022-08-07 | Stop reason: SURG

## 2022-08-07 RX ADMIN — LACTULOSE 30 G: 10 SOLUTION ORAL at 10:49

## 2022-08-07 RX ADMIN — Medication 1 MG: at 10:49

## 2022-08-07 RX ADMIN — Medication 220 MG: at 10:49

## 2022-08-07 RX ADMIN — CARVEDILOL 12.5 MG: 12.5 TABLET, FILM COATED ORAL at 17:18

## 2022-08-07 RX ADMIN — CITALOPRAM 20 MG: 20 TABLET, FILM COATED ORAL at 10:49

## 2022-08-07 RX ADMIN — RIFAXIMIN 550 MG: 550 TABLET ORAL at 21:08

## 2022-08-07 RX ADMIN — FAMOTIDINE 20 MG: 20 TABLET ORAL at 17:18

## 2022-08-07 RX ADMIN — LACTULOSE 30 G: 10 SOLUTION ORAL at 17:18

## 2022-08-07 RX ADMIN — GLYCOPYRROLATE 0.2 MG: 0.2 INJECTION INTRAMUSCULAR; INTRAVENOUS at 09:15

## 2022-08-07 RX ADMIN — ACETAMINOPHEN 650 MG: 325 TABLET, FILM COATED ORAL at 14:35

## 2022-08-07 RX ADMIN — RIFAXIMIN 550 MG: 550 TABLET ORAL at 10:49

## 2022-08-07 RX ADMIN — SODIUM CHLORIDE 1000 ML: 9 INJECTION, SOLUTION INTRAVENOUS at 08:57

## 2022-08-07 RX ADMIN — CARVEDILOL 12.5 MG: 12.5 TABLET, FILM COATED ORAL at 10:49

## 2022-08-07 RX ADMIN — ALLOPURINOL 100 MG: 100 TABLET ORAL at 10:49

## 2022-08-07 RX ADMIN — ACETAMINOPHEN 650 MG: 325 TABLET, FILM COATED ORAL at 21:14

## 2022-08-07 RX ADMIN — PROPOFOL 270 MG: 10 INJECTION, EMULSION INTRAVENOUS at 09:24

## 2022-08-07 RX ADMIN — Medication 1 TABLET: at 10:49

## 2022-08-07 RX ADMIN — LIDOCAINE HYDROCHLORIDE 100 MG: 20 INJECTION, SOLUTION INFILTRATION; PERINEURAL at 09:24

## 2022-08-07 RX ADMIN — Medication 10 ML: at 21:10

## 2022-08-07 RX ADMIN — Medication 100 MG: at 10:49

## 2022-08-07 NOTE — SIGNIFICANT NOTE
08/07/22 1130   OTHER   Discipline physical therapist   Rehab Time/Intention   Session Not Performed other (see comments)  (per RN, pt is not appropriate to work with PT today. Will check back tomorrow.)   Recommendation   PT - Next Appointment 08/08/22

## 2022-08-07 NOTE — PROGRESS NOTES
Name: Macario Carpio ADMIT: 2022   : 1970  PCP: Andrew Grayson JABARI, CALLUM    MRN: [id number] LOS: 7 days   AGE/SEX: 51 y.o. male  ROOM: [facility]   Subjective   Chief Complaint   Patient presents with   • Altered Mental Status      He remained drowsy yesterday afternoon and through this morning.  I saw him after his endoscopy.  Nursing was able to get him to arouse and he was answering questions for me and seemed to be closer to mental status of a few days ago which is improved.  Not reporting any chest pain palpitations or shortness of breath.  He is not have any abdominal pain currently.  No nausea vomiting.  He was aware that the pain medicines have been reduced and I discussed that with him at bedside.  He still is drowsy but improved compared to when I saw him yesterday.    Objective   Vital Signs  Temp:  [98.1 °F (36.7 °C)-99.7 °F (37.6 °C)] 98.1 °F (36.7 °C)  Heart Rate:  [72-90] 86  Resp:  [16-18] 16  BP: (101-174)/() 122/81  SpO2:  [91 %-94 %] 93 %  on   ;   Device (Oxygen Therapy): room air  Body mass index is 29.92 kg/m².    Physical Exam  Vitals and nursing note reviewed.   Constitutional:       Appearance: He is ill-appearing (chronically). He is not diaphoretic.   HENT:      Head: Normocephalic and atraumatic.   Eyes:      General:         Right eye: No discharge.         Left eye: No discharge.      Conjunctiva/sclera: Conjunctivae normal.   Cardiovascular:      Rate and Rhythm: Normal rate and regular rhythm.      Pulses: Normal pulses.   Pulmonary:      Effort: Pulmonary effort is normal.      Breath sounds: Decreased breath sounds and rhonchi present. No wheezing.   Abdominal:      General: There is no distension.      Palpations: Abdomen is soft.      Tenderness: There is no abdominal tenderness. There is no guarding or rebound.   Musculoskeletal:         General: Swelling, tenderness and deformity present.      Cervical back: Neck supple. No tenderness.      Right lower leg: No  edema.      Left lower leg: No edema.      Comments: Diffuse tophaceous gout. R toe prior amputation and chronic wound dressed.   Skin:     General: Skin is warm and dry.   Neurological:      Mental Status: He is alert. He is disoriented.   Psychiatric:         Cognition and Memory: Cognition is impaired. Memory is impaired.         Results Review:       I reviewed the patient's new clinical results.   I reviewed imaging/other test results and agree with interpretation.   I reviewed telemetry, sinus.          Results from last 7 days   Lab Units 08/07/22  1111 08/06/22  0305 08/05/22  1058 08/04/22  0833   WBC 10*3/mm3 8.25 10.18 8.21 5.79   HEMOGLOBIN g/dL 9.4* 9.7* 10.1* 9.9*   PLATELETS 10*3/mm3 119* 89* 87* 82*     Results from last 7 days   Lab Units 08/07/22  1111 08/06/22  0304 08/05/22  1058 08/04/22  0857   SODIUM mmol/L 135* 130* 128* 127*   POTASSIUM mmol/L 3.5 3.9 3.9 3.8   CHLORIDE mmol/L 102 101 101 102   CO2 mmol/L 20.6* 18.0* 17.0* 16.7*   BUN mg/dL 15 9 8 10   CREATININE mg/dL 0.60* 0.61* 0.69* 0.69*   GLUCOSE mg/dL 121* 102* 108* 87   Estimated Creatinine Clearance: 173.3 mL/min (A) (by C-G formula based on SCr of 0.6 mg/dL (L)).  Results from last 7 days   Lab Units 08/07/22  1111 08/06/22  1833 08/06/22  1039 08/06/22  0304 08/05/22  1541 08/05/22  1058 08/04/22  0857   CALCIUM mg/dL 8.4*  --   --  8.0*  --  8.0* 8.5*   ALBUMIN g/dL 2.70*  --   --  3.00*  --  2.70* 2.90*   MAGNESIUM mg/dL 1.4*  --   --  1.7  --  1.1* 2.0   PHOSPHORUS mg/dL 2.2* 2.8 2.1* 2.1*   < > 1.9* 2.3*    < > = values in this interval not displayed.     Results from last 7 days   Lab Units 08/03/22  0707 08/02/22  0637 08/01/22  1000   INR  1.27* 1.30* 1.28*        allopurinol, 100 mg, Oral, Daily  cadexomer iodine, 1 application, Topical, Once per day on Mon Thu  carvedilol, 12.5 mg, Oral, BID With Meals  citalopram, 20 mg, Oral, Daily  famotidine, 20 mg, Oral, BID AC  multivitamin, 1 tablet, Oral, Daily   And  folic acid,  1 mg, Oral, Daily  lactulose, 30 g, Oral, TID  rifAXIMin, 550 mg, Oral, Q12H  sodium chloride, 10 mL, Intravenous, Q12H  thiamine, 100 mg, Oral, Daily  zinc sulfate, 220 mg, Oral, Daily      sodium chloride, 1,000 mL, Last Rate: 25 mL/hr at 08/07/22 0906    Diet Clear Liquid    Assessment & Plan      Active Hospital Problems    Diagnosis  POA   • **Hepatic encephalopathy (HCC) [K72.90]  Yes   • 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       · Hepatic encephalopathy/alcoholic cirrhosis: Continue lactulose and Xifaxan.  EGD showing varices with red whale sign but no active bleeding.  He was banded.  He is on beta-blocker and famotidine currently.  GI following.  · Pancreatitis: Gallstones noted.  No acute cholecystitis on ultrasound.  Surgery evaluated.  Diet advanced. Weaning pain medications again due to drowsiness.  · YANNI on CKD: Improved.  Nephrology evaluated.  · Alcohol abuse/HCV: HCV viral load was not detected.  See above. Vitamin supplementation.  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  Continue allopurinol.  Alcohol abstinence will be important in controlling this disease progression.  · Low magnesium secondary to alcohol abuse: Continue replacement  · Low phosphorus: Replace  · Hyponatremia: Improving.  FW restriction.  · Hypertension: Better currently  · Chronic wound right foot: Dressed.  Wound care following.  · Prophylaxis: SCD.  Platelet count stabilizing. EGD today with banding  · Disposition: HH v SNF/few more days anticipated    Seferino Van MD  [facility]  08/07/22  12:26 EDT    Dictated portions using Dragon dictation software.    During the entire  encounter, I was wearing recommended PPE including face mask and eye protection. Hand sanitization was performed prior to entering room and upon exit.

## 2022-08-07 NOTE — NURSING NOTE
Access follow up. Patient currently undergoing EGD to evaluate for esophageal varices.  Per primary RN patient still experiencing lethargy.  Pain medication or Ativan have not been recently needed.  Access will continue to follow.

## 2022-08-07 NOTE — ANESTHESIA PREPROCEDURE EVALUATION
Anesthesia Evaluation     NPO Solid Status: > 8 hours             Airway   Mallampati: II  TM distance: >3 FB  Neck ROM: full  no difficulty expected  Dental - normal exam     Pulmonary - normal exam   Cardiovascular - normal exam    (+) hyperlipidemia,       Neuro/Psych  (+) psychiatric history Anxiety,    GI/Hepatic/Renal/Endo    (+)   hepatitis C, liver disease cirrhosis, renal disease,     Musculoskeletal     Abdominal  - normal exam   Substance History      OB/GYN          Other   arthritis,                      Anesthesia Plan    ASA 3     MAC     intravenous induction     Anesthetic plan, risks, benefits, and alternatives have been provided, discussed and informed consent has been obtained with: patient.        CODE STATUS:    Code Status (Patient has no pulse and is not breathing): CPR (Attempt to Resuscitate)  Medical Interventions (Patient has pulse or is breathing): Full Support

## 2022-08-07 NOTE — ANESTHESIA POSTPROCEDURE EVALUATION
"Patient: Macario Carpio    Procedure Summary     Date: 08/07/22 Room / Location: [facility] ENDOSCOPY 1 /  [facility]    Anesthesia Start: 0906 Anesthesia Stop: 0943    Procedure: ESOPHAGOGASTRODUODENOSCOPY WITH COLD BIOPSIES, BAND PLACEMENT X 3 (N/A Esophagus) Diagnosis:       Alcoholic cirrhosis of liver without ascites (HCC)      (Alcoholic cirrhosis of liver without ascites (HCC) [K70.30])    Surgeons: Macario Dent MD Provider: Tino Cortez MD    Anesthesia Type: MAC ASA Status: 3          Anesthesia Type: MAC    Vitals  Vitals Value Taken Time   /83 08/07/22 0958   Temp     Pulse 82 08/07/22 0958   Resp 16 08/07/22 0958   SpO2 94 % 08/07/22 0958           Post Anesthesia Care and Evaluation    Patient location during evaluation: bedside  Patient participation: complete - patient participated  Level of consciousness: awake and alert  Pain management: adequate    Airway patency: patent  Anesthetic complications: No anesthetic complications    Cardiovascular status: acceptable  Respiratory status: acceptable  Hydration status: acceptable    Comments: /81 (BP Location: Right arm, Patient Position: Lying)   Pulse 86   Temp 36.7 °C (98.1 °F) (Oral)   Resp 16   Ht 180.3 cm (71\")   Wt 97.3 kg (214 lb 8 oz)   SpO2 93%   BMI 29.92 kg/m²       "

## 2022-08-08 LAB
ALBUMIN SERPL-MCNC: 2.7 G/DL (ref 3.5–5.2)
ALBUMIN/GLOB SERPL: 0.9 G/DL
ALP SERPL-CCNC: 124 U/L (ref 39–117)
ALT SERPL W P-5'-P-CCNC: 60 U/L (ref 1–41)
ANION GAP SERPL CALCULATED.3IONS-SCNC: 8 MMOL/L (ref 5–15)
AST SERPL-CCNC: 225 U/L (ref 1–40)
BILIRUB SERPL-MCNC: 1.1 MG/DL (ref 0–1.2)
BUN SERPL-MCNC: 15 MG/DL (ref 6–20)
BUN/CREAT SERPL: 26.3 (ref 7–25)
CALCIUM SPEC-SCNC: 8.7 MG/DL (ref 8.6–10.5)
CHLORIDE SERPL-SCNC: 105 MMOL/L (ref 98–107)
CO2 SERPL-SCNC: 21 MMOL/L (ref 22–29)
CREAT SERPL-MCNC: 0.57 MG/DL (ref 0.76–1.27)
DEPRECATED RDW RBC AUTO: 44.3 FL (ref 37–54)
EGFRCR SERPLBLD CKD-EPI 2021: 118.7 ML/MIN/1.73
ERYTHROCYTE [DISTWIDTH] IN BLOOD BY AUTOMATED COUNT: 13.9 % (ref 12.3–15.4)
GLOBULIN UR ELPH-MCNC: 2.9 GM/DL
GLUCOSE SERPL-MCNC: 97 MG/DL (ref 65–99)
HCT VFR BLD AUTO: 26.4 % (ref 37.5–51)
HGB BLD-MCNC: 9.2 G/DL (ref 13–17.7)
MAGNESIUM SERPL-MCNC: 2.4 MG/DL (ref 1.6–2.6)
MCH RBC QN AUTO: 30.9 PG (ref 26.6–33)
MCHC RBC AUTO-ENTMCNC: 34.8 G/DL (ref 31.5–35.7)
MCV RBC AUTO: 88.6 FL (ref 79–97)
PHOSPHATE SERPL-MCNC: 2.9 MG/DL (ref 2.5–4.5)
PLATELET # BLD AUTO: 129 10*3/MM3 (ref 140–450)
PMV BLD AUTO: 11.4 FL (ref 6–12)
POTASSIUM SERPL-SCNC: 3 MMOL/L (ref 3.5–5.2)
PROT SERPL-MCNC: 5.6 G/DL (ref 6–8.5)
RBC # BLD AUTO: 2.98 10*6/MM3 (ref 4.14–5.8)
SODIUM SERPL-SCNC: 134 MMOL/L (ref 136–145)
WBC NRBC COR # BLD: 6.02 10*3/MM3 (ref 3.4–10.8)

## 2022-08-08 PROCEDURE — 99232 SBSQ HOSP IP/OBS MODERATE 35: CPT | Performed by: INTERNAL MEDICINE

## 2022-08-08 PROCEDURE — 25010000002 MAGNESIUM SULFATE 2 GM/50ML SOLUTION: Performed by: NURSE PRACTITIONER

## 2022-08-08 PROCEDURE — 97110 THERAPEUTIC EXERCISES: CPT

## 2022-08-08 PROCEDURE — 80053 COMPREHEN METABOLIC PANEL: CPT | Performed by: INTERNAL MEDICINE

## 2022-08-08 PROCEDURE — 85027 COMPLETE CBC AUTOMATED: CPT | Performed by: INTERNAL MEDICINE

## 2022-08-08 PROCEDURE — 97530 THERAPEUTIC ACTIVITIES: CPT

## 2022-08-08 PROCEDURE — 83735 ASSAY OF MAGNESIUM: CPT | Performed by: INTERNAL MEDICINE

## 2022-08-08 PROCEDURE — 84100 ASSAY OF PHOSPHORUS: CPT | Performed by: INTERNAL MEDICINE

## 2022-08-08 RX ORDER — CELECOXIB 400 MG/1
CAPSULE ORAL
Qty: 60 CAPSULE | Refills: 5 | OUTPATIENT
Start: 2022-08-08

## 2022-08-08 RX ADMIN — CITALOPRAM 20 MG: 20 TABLET, FILM COATED ORAL at 08:29

## 2022-08-08 RX ADMIN — Medication 100 MG: at 08:29

## 2022-08-08 RX ADMIN — CARVEDILOL 12.5 MG: 12.5 TABLET, FILM COATED ORAL at 17:04

## 2022-08-08 RX ADMIN — CARVEDILOL 12.5 MG: 12.5 TABLET, FILM COATED ORAL at 08:29

## 2022-08-08 RX ADMIN — Medication 1 MG: at 08:29

## 2022-08-08 RX ADMIN — Medication 1 APPLICATION: at 08:30

## 2022-08-08 RX ADMIN — ACETAMINOPHEN 650 MG: 325 TABLET, FILM COATED ORAL at 08:29

## 2022-08-08 RX ADMIN — MAGNESIUM SULFATE HEPTAHYDRATE 2 G: 2 INJECTION, SOLUTION INTRAVENOUS at 02:32

## 2022-08-08 RX ADMIN — LACTULOSE 30 G: 10 SOLUTION ORAL at 08:29

## 2022-08-08 RX ADMIN — POTASSIUM CHLORIDE 40 MEQ: 750 TABLET, EXTENDED RELEASE ORAL at 22:06

## 2022-08-08 RX ADMIN — Medication 220 MG: at 08:29

## 2022-08-08 RX ADMIN — Medication 10 ML: at 08:30

## 2022-08-08 RX ADMIN — OXYCODONE HYDROCHLORIDE 5 MG: 5 TABLET ORAL at 15:52

## 2022-08-08 RX ADMIN — MAGNESIUM SULFATE HEPTAHYDRATE 2 G: 2 INJECTION, SOLUTION INTRAVENOUS at 04:38

## 2022-08-08 RX ADMIN — Medication 1 TABLET: at 08:29

## 2022-08-08 RX ADMIN — ACETAMINOPHEN 650 MG: 325 TABLET, FILM COATED ORAL at 22:06

## 2022-08-08 RX ADMIN — Medication 10 ML: at 22:07

## 2022-08-08 RX ADMIN — FAMOTIDINE 20 MG: 20 TABLET ORAL at 17:04

## 2022-08-08 RX ADMIN — MAGNESIUM SULFATE HEPTAHYDRATE 2 G: 2 INJECTION, SOLUTION INTRAVENOUS at 00:24

## 2022-08-08 RX ADMIN — RIFAXIMIN 550 MG: 550 TABLET ORAL at 22:06

## 2022-08-08 RX ADMIN — ALLOPURINOL 100 MG: 100 TABLET ORAL at 08:29

## 2022-08-08 RX ADMIN — RIFAXIMIN 550 MG: 550 TABLET ORAL at 08:29

## 2022-08-08 RX ADMIN — FAMOTIDINE 20 MG: 20 TABLET ORAL at 06:27

## 2022-08-08 NOTE — PLAN OF CARE
Goal Outcome Evaluation:  Plan of Care Reviewed With: patient        Progress: improving  Outcome Evaluation: Pt agreeable to PT this PM with some encouragement. Pt supine in bed with soiled brief - increased time spent rolling and cleaning pt up before OOB mobility. Pt able to assist some with rolling - initiating UE reaching, but still requiring mod A x1. Pt required mod-max A x2 to tranfer to EOB and demonstrated good independent sitting balance. Completed LE exercises with cues for full ROM and performing slowly to work on strength. C/o gout-related pain throughout session but agreeable to progressing mobility. Pt stood 2x with mod-max A x2 and HHA - able to clear bed on second attempt but fatigues quickly and only able to stand for 5-10 seconds. Requested return to supine and assisted with repositioning, left with all needs met. PT will continue to follow and progress mobility as tolerated. Recommending D/C to SNF.

## 2022-08-08 NOTE — THERAPY TREATMENT NOTE
Patient Name: Macario Carpio  : 1970    MRN: [id number]                              Today's Date: 2022       Admit Date: 2022    Visit Dx:     ICD-10-CM ICD-9-CM   1. Hepatic encephalopathy (HCC)  K72.90 572.2   2. Uremic encephalopathy  G93.49 348.31    N19    3. Acute kidney injury (HCC)  N17.9 584.9   4. Hypomagnesemia  E83.42 275.2   5. Alcoholic cirrhosis, unspecified whether ascites present (HCC)  K70.30 571.2   6. Alcoholic cirrhosis of liver without ascites (HCC)  K70.30 571.2     Patient Active Problem List   Diagnosis   • Iron deficiency anemia   • Hepatic cirrhosis (HCC)   • Screening for malignant neoplasm of colon   • Abdominal aortic aneurysm (AAA) (HCC)   • Hepatitis C virus infection   • Gout   • Hypercholesterolemia   • Hepatic cirrhosis (HCC)   • Thoracic aortic aneurysm without rupture (HCC)   • Hypertensive disorder   • Hip pain   • Chronic pain   • Bicuspid aortic valve   • Arthritis of right hip   • Aortic valve regurgitation   • Anxiety   • Abdominal aortic aneurysm (AAA) (HCC)   • Hepatic cirrhosis (HCC)   • Hepatitis C virus infection   • Chronic pain   • Hepatic encephalopathy (HCC)   • Alcoholic cirrhosis of liver without ascites (HCC)   • YANNI (acute kidney injury) (HCC)   • Hypomagnesemia   • Tophaceous gout     Past Medical History:   Diagnosis Date   • Gout    • Hepatitis C      Past Surgical History:   Procedure Laterality Date   • ENDOSCOPY N/A 2022    Procedure: ESOPHAGOGASTRODUODENOSCOPY WITH COLD BIOPSIES, BAND PLACEMENT X 3;  Surgeon: Macario Dent MD;  Location: [facility] ENDOSCOPY;  Service: Gastroenterology;  Laterality: N/A;  PRE- CIRRHOSIS, EVAL OF VARICES  POST- GASTRIC ULCER, GASTRITIS. PORTAL HYPERTENSIVE GASTROPATHY, GASTRIC & ESOPHAGEAL VARICES   • TOE AMPUTATION     • TOTAL HIP ARTHROPLASTY Right 2020      General Information     Row Name 22 2596          Physical Therapy Time and Intention    Document Type therapy note (daily note)   -     Mode of Treatment individual therapy;physical therapy  -     Row Name 08/08/22 1616          General Information    Patient Profile Reviewed yes  -     Existing Precautions/Restrictions fall  -     Row Name 08/08/22 1616          Cognition    Orientation Status (Cognition) oriented to;person  -     Row Name 08/08/22 1616          Safety Issues, Functional Mobility    Impairments Affecting Function (Mobility) endurance/activity tolerance;strength;pain;balance;cognition;range of motion (ROM)  -           User Key  (r) = Recorded By, (t) = Taken By, (c) = Cosigned By    Initials Name Provider Type     Nayely Glover PT Physical Therapist               Mobility     Row Name 08/08/22 1616          Bed Mobility    Bed Mobility supine-sit;sit-supine;rolling left;rolling right  -     Rolling Left [place] (Bed Mobility) minimum assist (75% patient effort);1 person assist;verbal cues  -     Rolling Right [place] (Bed Mobility) verbal cues;minimum assist (75% patient effort);1 person assist  -     Supine-Sit [place] (Bed Mobility) minimum assist (75% patient effort);moderate assist (50% patient effort);2 person assist;verbal cues  -     Sit-Supine [place] (Bed Mobility) minimum assist (75% patient effort);moderate assist (50% patient effort);2 person assist;verbal cues  -     Assistive Device (Bed Mobility) bed rails;head of bed elevated  -     Comment, (Bed Mobility) Increased time to complete, heavy cueing to reach for bedrails to assist with rolling  -     Row Name 08/08/22 1616          Sit-Stand Transfer    Sit-Stand [place] (Transfers) maximum assist (25% patient effort);2 person assist;verbal cues;nonverbal cues (demo/gesture)  -     Assistive Device (Sit-Stand Transfers) other (see comments)  HHA x2  -     Comment, (Sit-Stand Transfer) Able to clear bottom from bed on second attempt, but fatigues quickly. Only able to stand for ~10 seconds  -            User Key  (r) = Recorded By, (t) = Taken By, (c) = Cosigned By    Initials Name Provider Type     Nayely Glover PT Physical Therapist               Obj/Interventions     Row Name 08/08/22 1619          Motor Skills    Therapeutic Exercise --  10 reps B AP/LAQ/seated [facility]  -           User Key  (r) = Recorded By, (t) = Taken By, (c) = Cosigned By    Initials Name Provider Type     Nayely Glover PT Physical Therapist               Goals/Plan    No documentation.                Clinical Impression     Row Name 08/08/22 1619          Pain    Pretreatment Pain Rating 8/10  -     Pain Location generalized  -     Pre/Posttreatment Pain Comment RN present and gave pain meds during session  -     Pain Intervention(s) Repositioned;Ambulation/increased activity;Medication (See MAR);Nursing Notified  -     Row Name 08/08/22 1619          Plan of Care Review    Plan of Care Reviewed With patient  -     Progress improving  -     Outcome Evaluation Pt agreeable to PT this PM with some encouragement. Pt supine in bed with soiled brief - increased time spent rolling and cleaning pt up before OOB mobility. Pt able to assist some with rolling - initiating UE reaching, but still requiring mod A x1. Pt required mod-max A x2 to tranfer to EOB and demonstrated good independent sitting balance. Completed LE exercises with cues for full ROM and performing slowly to work on strength. C/o gout-related pain throughout session but agreeable to progressing mobility. Pt stood 2x with mod-max A x2 and HHA - able to clear bed on second attempt but fatigues quickly and only able to stand for 5-10 seconds. Requested return to supine and assisted with repositioning, left with all needs met. PT will continue to follow and progress mobility as tolerated. Recommending D/C to SNF.  -     Row Name 08/08/22 1619          Positioning and Restraints    Pre-Treatment Position in bed  -     Post Treatment Position bed  -      In Bed supine;call light within reach;encouraged to call for assist;exit alarm on;notified [facility]           User Key  (r) = Recorded By, (t) = Taken By, (c) = Cosigned By    Initials Name Provider Type    Nayely Lechuga PT Physical Therapist               Outcome Measures     Row Name 08/08/22 1631          How much help from another person do you currently need...    Turning from your back to your side while in flat bed without using bedrails? 2  -BH     Moving from lying on back to sitting on the side of a flat bed without bedrails? 2  -BH     Moving to and from a bed to a chair (including a wheelchair)? 2  -BH     Standing up from a chair using your arms (e.g., wheelchair, bedside chair)? 2  -BH     Climbing 3-5 steps with a railing? 1  -BH     To walk in hospital room? 1  -     AM-PAC 6 Clicks Score (PT) 10  -     Highest level of mobility 4 --> Transferred to chair/commode  -     Row Name 08/08/22 1631          Functional Assessment    Outcome Measure Options AM-PAC 6 Clicks Basic Mobility (PT)  -           User Key  (r) = Recorded By, (t) = Taken By, (c) = Cosigned By    Initials Name Provider Type    Nayely Lechuga PT Physical Therapist                             Physical Therapy Education                 Title: PT OT SLP Therapies (Done)     Topic: Physical Therapy (Done)     Point: Mobility training (Done)     Learning Progress Summary           Patient Acceptance, E,TB,D, VU,NR by  at 8/8/2022 1631    Acceptance, E,TB, VU by TALA at 8/6/2022 0431    Acceptance, E, VU by MIKAYLA at 8/5/2022 1515    Comment: Pt educated on safe positioning when seated EOB.    Acceptance, E, VU by JANNETH at 8/5/2022 1513                   Point: Home exercise program (Done)     Learning Progress Summary           Patient Acceptance, E,TB,D, VU,NR by  at 8/8/2022 1631    Acceptance, E,TB, VU by TALA at 8/6/2022 0431    Acceptance, E, VU by MIKAYLA at 8/5/2022 1515    Comment: Pt educated on safe positioning when  seated EOB.    Acceptance, E, VU by  at 8/5/2022 1513                   Point: Body mechanics (Done)     Learning Progress Summary           Patient Acceptance, E,TB,D, VU,NR by  at 8/8/2022 1631    Acceptance, E,TB, VU by  at 8/6/2022 0431    Acceptance, E, VU by  at 8/5/2022 1515    Comment: Pt educated on safe positioning when seated EOB.    Acceptance, E, VU by  at 8/5/2022 1513                   Point: Precautions (Done)     Learning Progress Summary           Patient Acceptance, E,TB,D, VU,NR by  at 8/8/2022 1631    Acceptance, E,TB, VU by  at 8/6/2022 0431    Acceptance, E, VU by  at 8/5/2022 1515    Comment: Pt educated on safe positioning when seated EOB.    Acceptance, E, VU by  at 8/5/2022 1513                               User Key     Initials Effective Dates Name Provider Type Discipline     06/16/21 -  Nola Atkinson, RN Registered Nurse Nurse     04/08/22 -  Nayely Glover, PT Physical Therapist PT     08/02/22 -  Beto Smith OT Occupational Therapist OT     05/02/22 -  Valarie Haley PT Physical Therapist PT              PT Recommendation and Plan     Plan of Care Reviewed With: patient  Progress: improving  Outcome Evaluation: Pt agreeable to PT this PM with some encouragement. Pt supine in bed with soiled brief - increased time spent rolling and cleaning pt up before OOB mobility. Pt able to assist some with rolling - initiating UE reaching, but still requiring mod A x1. Pt required mod-max A x2 to tranfer to EOB and demonstrated good independent sitting balance. Completed LE exercises with cues for full ROM and performing slowly to work on strength. C/o gout-related pain throughout session but agreeable to progressing mobility. Pt stood 2x with mod-max A x2 and HHA - able to clear bed on second attempt but fatigues quickly and only able to stand for 5-10 seconds. Requested return to supine and assisted with repositioning, left with all needs met. PT will continue  to follow and progress mobility as tolerated. Recommending D/C to SNF.     Time Calculation:    PT Charges     Row Name 08/08/22 1631             Time Calculation    Start Time 1535  -      Stop Time 1610  -      Time Calculation (min) 35 min  -      PT Received On 08/08/22  -      PT - Next Appointment 08/09/22  -              Time Calculation- PT    Total Timed Code Minutes- PT 35 minute(s)  -              Timed Charges    26845 - PT Therapeutic Exercise Minutes 10  -BH      35295 - PT Therapeutic Activity Minutes 25  -              Total Minutes    Timed Charges Total Minutes 35  -       Total Minutes 35  -BH            User Key  (r) = Recorded By, (t) = Taken By, (c) = Cosigned By    Initials Name Provider Type     Nayely Glover, PT Physical Therapist              Therapy Charges for Today     Code Description Service Date Service Provider Modifiers Qty    22209376274  PT THER PROC EA 15 MIN 8/8/2022 Nayely Glover, PT GP 1    97440502645 HC PT THERAPEUTIC ACT EA 15 MIN 8/8/2022 Nayely Glover, PT GP 1          PT G-Codes  Outcome Measure Options: AM-PAC 6 Clicks Basic Mobility (PT)  AM-PAC 6 Clicks Score (PT): 10  AM-PAC 6 Clicks Score (OT): 13  Modified Butler Scale: 4 - Moderately severe disability.  Unable to walk without assistance, and unable to attend to own bodily needs without assistance.    Nayely Glover PT  8/8/2022

## 2022-08-08 NOTE — NURSING NOTE
08/08/22 1435   Wound 08/01/22 0700 Right medial foot   Placement Date/Time: 08/01/22 0700   Present on Hospital Admission: Yes  Side: Right  Orientation: medial  Location: foot   Base moist;pink;granulating   Periwound intact;pink   Edges   (crusted drainage, cleansed and removed)   Drainage Amount none   Care, Wound cleansed with;sterile normal saline  (iodosorb gel, 2x2 gauze, wrap with kerlix/ace foot/ankle)   Dressing Care dressing changed     Wound/ostomy - wound care completed today. I was able to cleanse away the remaining crusted drainage from periwound today. Wound bed granulating, unchanged from when last seen by myself. Wound nurse will continue to manage dressing changes while patient is here, dressing to be changed 2 times weekly. Patient is current with [facility]  for dressing changes.

## 2022-08-08 NOTE — PLAN OF CARE
Goal Outcome Evaluation:         Patient alert and oriented, very sleepy, but he opens his eyes when spoken to and answers all questions without hesitation. Tylenol given for hand/foot pain. VSS. Magnesium replacement protocol followed. Q2 turns. Lactulose not given as he did not want to take it at scheduled time and later on he was too sleepy to take. Multiple watery bowel movements. Wife updated on his condition. All needs met.

## 2022-08-08 NOTE — TELEPHONE ENCOUNTER
Spoke to patients wife.  He is in the hospital and she is going to call the specialist who writes it for him.

## 2022-08-08 NOTE — NURSING NOTE
[facility] follow-up regarding alcohol.  Patient RIB, lethargic, groggy, mumbles.  He c/o pain, RN informed.  He denies anxiety, depression and SI.  He denies need for KIKE tx.    AC following.

## 2022-08-08 NOTE — NURSING NOTE
Patient remained lethargic today, did agree to work with physical therapy, pt did not eat breakfast or lunch, VSS, foot dressing changed

## 2022-08-08 NOTE — CASE MANAGEMENT/SOCIAL WORK
Continued Stay Note  [facility]     Patient Name: Macario Carpio  MRN: [id number]  Today's Date: 8/8/2022    Admit Date: 7/31/2022     Discharge Plan     Row Name 08/08/22 1544       Plan    Plan SNF vs Home with [facility] - Referral pending to [facility]    Patient/Family in Agreement with Plan yes    Plan Comments Spoke with patient's spouse- Marco who stated patient/family preferred patient go home with HH and they are not interested in SNF. Information Spouse that patient currently requires Mod assist x2 and she stated family would always be present. Spouse stated she would come see patient tomorrow and assess if she thought they could care for patient safely at home. CCP will follow up with Spouse tomorrow morning regarding therapy recs and family assessement of care. CCP to follow. Mike EPPERSON               Discharge Codes    No documentation.               Expected Discharge Date and Time     Expected Discharge Date Expected Discharge Time    Aug 11, 2022             Mike Johnson RN

## 2022-08-08 NOTE — PROGRESS NOTES
[facility]  Inpatient Progress Note    Reason for Follow Up: Cirrhosis    Subjective     Interval History:   EGD yesterday with 3 bands placed on red spots on esophageal varices    Current Facility-Administered Medications:   •  acetaminophen (TYLENOL) tablet 650 mg, 650 mg, Oral, Q4H PRN, 650 mg at 08/08/22 0829 **OR** acetaminophen (TYLENOL) 160 MG/5ML solution 650 mg, 650 mg, Oral, Q4H PRN **OR** acetaminophen (TYLENOL) suppository 650 mg, 650 mg, Rectal, Q4H PRN, Foreign Wilkins MD  •  allopurinol (ZYLOPRIM) tablet 100 mg, 100 mg, Oral, Daily, Foreign Wilkins MD, 100 mg at 08/08/22 0829  •  cadexomer iodine (IODOSORB) 0.9 % gel 1 application, 1 application, Topical, Once per day on Mon Thu, Seferino Van MD, 1 application at 08/08/22 0830  •  carvedilol (COREG) tablet 12.5 mg, 12.5 mg, Oral, BID With Meals, Foreign Wilkins MD, 12.5 mg at 08/08/22 0829  •  citalopram (CeleXA) tablet 20 mg, 20 mg, Oral, Daily, Foreign Wilkins MD, 20 mg at 08/08/22 0829  •  famotidine (PEPCID) tablet 20 mg, 20 mg, Oral, BID AC, Seferino Van MD, 20 mg at 08/08/22 0627  •  multivitamin (THERAGRAN) tablet 1 tablet, 1 tablet, Oral, Daily, 1 tablet at 08/08/22 0829 **AND** folic acid (FOLVITE) tablet 1 mg, 1 mg, Oral, Daily, Seferino Van MD, 1 mg at 08/08/22 0829  •  lactulose (CHRONULAC) 10 GM/15ML solution 30 g, 30 g, Oral, TID, Seferino Van MD, 30 g at 08/08/22 0829  •  Magnesium Sulfate 2 gram Bolus, followed by 8 gram infusion (total Mg dose 10 grams)- Mg less than or equal to 1mg/dL, 2 g, Intravenous, PRN **OR** Magnesium Sulfate 2 gram / 50mL Infusion (GIVE X 3 BAGS TO EQUAL 6GM TOTAL DOSE) - Mg 1.1 - 1.5 mg/dl, 2 g, Intravenous, PRN, Last Rate: 25 mL/hr at 08/08/22 0438, 2 g at 08/08/22 0438 **OR** Magnesium Sulfate 4 gram infusion- Mg 1.6-1.9 mg/dL, 4 g, Intravenous, PRN, Sanjay Diane, APRN  •  nitroglycerin (NITROSTAT) SL tablet 0.4 mg, 0.4 mg,  Sublingual, Q5 Min PRN, Foreign Wilkins MD  •  ondansetron (ZOFRAN) tablet 4 mg, 4 mg, Oral, Q6H PRN, 4 mg at 08/02/22 0316 **OR** ondansetron (ZOFRAN) injection 4 mg, 4 mg, Intravenous, Q6H PRN, Foreign Wilkins MD, 4 mg at 08/01/22 0345  •  oxyCODONE (ROXICODONE) immediate release tablet 5 mg, 5 mg, Oral, Q6H PRN **OR** [DISCONTINUED] oxyCODONE (ROXICODONE) immediate release tablet 10 mg, 10 mg, Oral, Q6H PRN, Seferino Van MD  •  potassium & sodium phosphates (PHOS-NAK) 280-160-250 MG packet - for Phosphorus less than 1.25 mg/dL, 2 packet, Oral, Q6H PRN **OR** potassium & sodium phosphates (PHOS-NAK) 280-160-250 MG packet - for Phosphorus 1.25 - 2.5 mg/dL, 2 packet, Oral, Q6H PRN, Seferino Van MD, 2 packet at 08/06/22 1359  •  potassium chloride (K-DUR,KLOR-CON) ER tablet 40 mEq, 40 mEq, Oral, PRN **OR** potassium chloride (KLOR-CON) packet 40 mEq, 40 mEq, Oral, PRN **OR** potassium chloride 10 mEq in 100 mL IVPB, 10 mEq, Intravenous, Q1H PRN, Seferino Van MD  •  riFAXIMin (XIFAXAN) tablet 550 mg, 550 mg, Oral, Q12H, Seferino Van MD, 550 mg at 08/08/22 0829  •  [COMPLETED] Insert peripheral IV, , , Once **AND** sodium chloride 0.9 % flush 10 mL, 10 mL, Intravenous, PRN, Tomasz Robles MD  •  sodium chloride 0.9 % flush 10 mL, 10 mL, Intravenous, Q12H, Foreign Wilkins MD, 10 mL at 08/08/22 0830  •  sodium chloride 0.9 % flush 10 mL, 10 mL, Intravenous, PRN, Foreign Wilkins MD  •  sodium chloride 0.9 % infusion 1,000 mL, 1,000 mL, Intravenous, Continuous, Macario Dent MD, Last Rate: 25 mL/hr at 08/07/22 0906, Restarted at 08/07/22 0920  •  thiamine (VITAMIN B-1) tablet 100 mg, 100 mg, Oral, Daily, Seferino Van MD, 100 mg at 08/08/22 0829  •  zinc sulfate (ZINCATE) capsule 220 mg, 220 mg, Oral, Daily, Foreign Wilkins MD, 220 mg at 08/08/22 0829  Review of Systems:    There is weakness and fatigue all other systems reviewed and    Objective     Vital  Signs  Temp:  [97.6 °F (36.4 °C)-98.3 °F (36.8 °C)] 98.2 °F (36.8 °C)  Heart Rate:  [63-86] 64  Resp:  [16-18] 18  BP: (101-133)/(62-83) 133/82  Body mass index is 30.42 kg/m².    Intake/Output Summary (Last 24 hours) at 8/8/2022 0942  Last data filed at 8/8/2022 0922  Gross per 24 hour   Intake 100 ml   Output 780 ml   Net -680 ml     I/O this shift:  In: -   Out: 200 [Urine:200]     Physical Exam:   General: patient awake, alert and cooperative   Eyes: Normal lids and lashes, no scleral icterus   Neck: supple, normal ROM   Skin: warm and dry, not jaundiced   Cardiovascular: regular rhythm and rate, no murmurs auscultated   Pulm: clear to auscultation bilaterally, regular and unlabored   Abdomen: soft, nontender, nondistended; normal bowel sounds   Extremities: no rash or edema   Psychiatric: Normal mood and behavior; memory intact     Results Review:     I reviewed the patient's new clinical results.    Results from last 7 days   Lab Units 08/08/22  0646 08/07/22  1111 08/06/22  0305   WBC 10*3/mm3 6.02 8.25 10.18   HEMOGLOBIN g/dL 9.2* 9.4* 9.7*   HEMATOCRIT % 26.4* 26.8* 27.5*   PLATELETS 10*3/mm3 129* 119* 89*     Results from last 7 days   Lab Units 08/08/22  0646 08/07/22  1111 08/06/22  0304   SODIUM mmol/L 134* 135* 130*   POTASSIUM mmol/L 3.0* 3.5 3.9   CHLORIDE mmol/L 105 102 101   CO2 mmol/L 21.0* 20.6* 18.0*   BUN mg/dL 15 15 9   CREATININE mg/dL 0.57* 0.60* 0.61*   CALCIUM mg/dL 8.7 8.4* 8.0*   BILIRUBIN mg/dL 1.1 1.3* 1.5*   ALK PHOS U/L 124* 111 119*   ALT (SGPT) U/L 60* 22 22   AST (SGOT) U/L 225* 56* 67*   GLUCOSE mg/dL 97 121* 102*     Results from last 7 days   Lab Units 08/03/22  0707 08/02/22  0637 08/01/22  1000   INR  1.27* 1.30* 1.28*     Lab Results   Lab Value Date/Time    LIPASE 375 (H) 08/05/2022 1058    LIPASE 609 (H) 08/04/2022 0857       Radiology:  US Gallbladder   Final Result   Multiple gallstones within the gallbladder. No appreciable wall   thickening or pericholecystic fluid        This report was finalized on 8/3/2022 7:17 PM by Dr. Aaron Novak M.D.          CT Abdomen Pelvis Without Contrast   Final Result   1. Cholelithiasis with gallbladder wall thickening somewhat limited by   subjacent motion. Although findings may be the sequela of hepatic   dysfunction, recommend ultrasound and/or HIDA scan if there is clinical   concern for cholecystitis.   2. Morphologic changes of cirrhosis with portal hypertension with   splenomegaly and portosystemic shunting as described above.   3. Nonobstructing nephrolithiasis, left greater than right.   4. Please see above for additional findings/recommendations.       This report was finalized on 8/3/2022 11:44 AM by Dr. Carlos Alberto De Anda M.D.          CT Head Without Contrast   Final Result   No acute process identified. Further evaluation could be   performed with an MRI examination of the brain as indicated.       Radiation dose reduction techniques were utilized, including automated   exposure control and exposure modulation based on body size.       This report was finalized on 8/1/2022 7:10 AM by Dr. Juan Bosewll M.D.          CT Abdomen Pelvis Without Contrast   Final Result         Electronically signed by Donal Vigil MD on 07-31-22 at 1923      XR Chest 1 View   Final Result          Assessment & Plan     Patient Active Problem List   Diagnosis   • Iron deficiency anemia   • Hepatic cirrhosis (HCC)   • Screening for malignant neoplasm of colon   • Abdominal aortic aneurysm (AAA) (HCC)   • Hepatitis C virus infection   • Gout   • Hypercholesterolemia   • Hepatic cirrhosis (HCC)   • Thoracic aortic aneurysm without rupture (HCC)   • Hypertensive disorder   • Hip pain   • Chronic pain   • Bicuspid aortic valve   • Arthritis of right hip   • Aortic valve regurgitation   • Anxiety   • Abdominal aortic aneurysm (AAA) (HCC)   • Hepatic cirrhosis (HCC)   • Hepatitis C virus infection   • Chronic pain   • Hepatic encephalopathy (HCC)   • Alcoholic  cirrhosis of liver without ascites (HCC)   • YANNI (acute kidney injury) (HCC)   • Hypomagnesemia   • Tophaceous gout     Assessment:   1. Hepatic encephalopathy  2. Cirrhosis with history of alcohol abuse as well as hepatitis C  3. Anemia, no active bleeding  4. Cholelithiasis  5. Esophageal varices banded, PHG          Plan:   Continue lactulose/Xifaxin for PSE  2gm low NA diet  General surgery note reviewed, not a good candidate for CCY  Beta blocker initiated for esophageal varices, esophageal varices banded yesterday  Pepcid for gut protection    I discussed the patients findings and my recommendations with patient and nursing staff.    Wyatt Moulton MD

## 2022-08-08 NOTE — TELEPHONE ENCOUNTER
The pharmacy sent in note asking us to clarify what the patient is taking?  As far as I can see in the chart Andrew has never prescribed the Celebrex for him.  He sees a bunch of specialists.  Please clarify with the patient.  I am probably not going to refill either of these.

## 2022-08-08 NOTE — PROGRESS NOTES
Name: Macario Carpio ADMIT: 2022   : 1970  PCP: Andrew Grayson JABARI, APRN    MRN: [id number] LOS: 8 days   AGE/SEX: 51 y.o. male  ROOM: [facility]   Subjective   Chief Complaint   Patient presents with   • Altered Mental Status      Patient is lying in bed and appears very weak and lethargic and is awake and answering questions reasonably well and is oriented to person place month and year.  Denies nausea, vomiting, abdominal pain, chest pain, shortness of breath.    Objective   Vital Signs  Temp:  [98.2 °F (36.8 °C)-98.3 °F (36.8 °C)] 98.2 °F (36.8 °C)  Heart Rate:  [63-68] 63  Resp:  [18] 18  BP: (126-133)/(73-82) 132/82  SpO2:  [90 %-93 %] 90 %  on   ;   Device (Oxygen Therapy): room air  Body mass index is 30.42 kg/m².    Physical Exam  Vitals and nursing note reviewed.   Constitutional:       Appearance: He is ill-appearing (chronically). He is not diaphoretic.   HENT:      Head: Normocephalic and atraumatic.      Mouth/Throat:      Mouth: Mucous membranes are moist.   Eyes:      General:         Right eye: No discharge.         Left eye: No discharge.      Conjunctiva/sclera: Conjunctivae normal.      Pupils: Pupils are equal, round, and reactive to light.   Cardiovascular:      Rate and Rhythm: Normal rate and regular rhythm.      Pulses: Normal pulses.   Pulmonary:      Effort: Pulmonary effort is normal.      Breath sounds: Decreased breath sounds and rhonchi present. No wheezing.   Abdominal:      General: There is no distension.      Palpations: Abdomen is soft.      Tenderness: There is no abdominal tenderness. There is no guarding or rebound.   Musculoskeletal:         General: Swelling, tenderness and deformity present.      Cervical back: Normal range of motion and neck supple. No tenderness.      Right lower leg: No edema.      Left lower leg: No edema.      Comments: Diffuse tophaceous gout. R toe prior amputation and chronic wound dressed.   Skin:     General: Skin is warm and dry.    Neurological:      Mental Status: He is alert and oriented to person, place, and time.      Comments: Globally weak   Psychiatric:         Cognition and Memory: Cognition is impaired. Memory is impaired.         Results Review:       I reviewed the patient's new clinical results.   I reviewed imaging/other test results and agree with interpretation.   I reviewed telemetry, sinus.          Results from last 7 days   Lab Units 08/08/22  0646 08/07/22  1111 08/06/22  0305 08/05/22  1058   WBC 10*3/mm3 6.02 8.25 10.18 8.21   HEMOGLOBIN g/dL 9.2* 9.4* 9.7* 10.1*   PLATELETS 10*3/mm3 129* 119* 89* 87*     Results from last 7 days   Lab Units 08/08/22  0646 08/07/22  1111 08/06/22  0304 08/05/22  1058   SODIUM mmol/L 134* 135* 130* 128*   POTASSIUM mmol/L 3.0* 3.5 3.9 3.9   CHLORIDE mmol/L 105 102 101 101   CO2 mmol/L 21.0* 20.6* 18.0* 17.0*   BUN mg/dL 15 15 9 8   CREATININE mg/dL 0.57* 0.60* 0.61* 0.69*   GLUCOSE mg/dL 97 121* 102* 108*   Estimated Creatinine Clearance: 183.7 mL/min (A) (by C-G formula based on SCr of 0.57 mg/dL (L)).  Results from last 7 days   Lab Units 08/08/22  0646 08/07/22  1111 08/06/22  1833 08/06/22  1039 08/06/22  0304 08/05/22  1541 08/05/22  1058   CALCIUM mg/dL 8.7 8.4*  --   --  8.0*  --  8.0*   ALBUMIN g/dL 2.70* 2.70*  --   --  3.00*  --  2.70*   MAGNESIUM mg/dL 2.4 1.4*  --   --  1.7  --  1.1*   PHOSPHORUS mg/dL  --  2.2* 2.8 2.1* 2.1*   < > 1.9*    < > = values in this interval not displayed.     Results from last 7 days   Lab Units 08/03/22  0707 08/02/22  0637   INR  1.27* 1.30*        allopurinol, 100 mg, Oral, Daily  cadexomer iodine, 1 application, Topical, Once per day on Mon Thu  carvedilol, 12.5 mg, Oral, BID With Meals  citalopram, 20 mg, Oral, Daily  famotidine, 20 mg, Oral, BID AC  multivitamin, 1 tablet, Oral, Daily   And  folic acid, 1 mg, Oral, Daily  lactulose, 30 g, Oral, TID  rifAXIMin, 550 mg, Oral, Q12H  sodium chloride, 10 mL, Intravenous, Q12H  thiamine, 100 mg,  Oral, Daily  zinc sulfate, 220 mg, Oral, Daily      sodium chloride, 1,000 mL, Last Rate: 25 mL/hr at 08/07/22 0906    Diet Clear Liquid    Assessment & Plan      Active Hospital Problems    Diagnosis  POA   • **Hepatic encephalopathy (HCC) [K72.90]  Yes   • 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       · Hepatic encephalopathy/alcoholic cirrhosis: Mental status has been improving and will continue with Xifaxan as well as lactulose.  Underwent EGD which revealed varices along with red lizet sign but no evidence of any active bleeding and underwent banding as well.  We will continue with beta-blockers as well as famotidine.    · Pancreatitis: Abdominal pain has been improving and is ultrasound revealed gallstones.  Surgery did evaluate and no need for surgery as an inpatient basis.  Tolerating solid diet.  · YANNI on CKD: Improved.  Nephrology evaluated.  · Alcohol abuse/HCV: HCV viral load was not detected.  Currently not going through any withdrawal and continue with multivitamins and supplements.  Counseled to quit drinking alcohol.  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  Continue with allopurinol.  · Low magnesium secondary to alcohol abuse: On replacement protocol.  · Low phosphorus: Replace  · Hyponatremia: Improving and sodium is better at 134 and continue with free water restriction.  · Hypertension: On Coreg and blood pressure is reasonably well controlled.  · Chronic wound right foot: Dressed.  Wound care following.  · Prophylaxis: SCD.  Platelet count stabilizing.  Status post EGD with banding earlier during his hospital stay.  · Disposition:  v SNF/few more days anticipated    Ino  Josephine Chavira MD  [facility]  08/08/22  14:33 EDT    Dictated portions using Dragon dictation software.    During the entire encounter, I was wearing recommended PPE including face mask and eye protection. Hand sanitization was performed prior to entering room and upon exit.

## 2022-08-09 LAB
ALBUMIN SERPL-MCNC: 2.6 G/DL (ref 3.5–5.2)
ALBUMIN/GLOB SERPL: 0.9 G/DL
ALP SERPL-CCNC: 124 U/L (ref 39–117)
ALT SERPL W P-5'-P-CCNC: 60 U/L (ref 1–41)
ANION GAP SERPL CALCULATED.3IONS-SCNC: 10 MMOL/L (ref 5–15)
AST SERPL-CCNC: 177 U/L (ref 1–40)
BASOPHILS # BLD AUTO: 0.04 10*3/MM3 (ref 0–0.2)
BASOPHILS NFR BLD AUTO: 1 % (ref 0–1.5)
BILIRUB SERPL-MCNC: 0.8 MG/DL (ref 0–1.2)
BUN SERPL-MCNC: 12 MG/DL (ref 6–20)
BUN/CREAT SERPL: 22.2 (ref 7–25)
CALCIUM SPEC-SCNC: 8.6 MG/DL (ref 8.6–10.5)
CHLORIDE SERPL-SCNC: 105 MMOL/L (ref 98–107)
CO2 SERPL-SCNC: 20 MMOL/L (ref 22–29)
CREAT SERPL-MCNC: 0.54 MG/DL (ref 0.76–1.27)
DEPRECATED RDW RBC AUTO: 47.3 FL (ref 37–54)
EGFRCR SERPLBLD CKD-EPI 2021: 120.7 ML/MIN/1.73
EOSINOPHIL # BLD AUTO: 0.04 10*3/MM3 (ref 0–0.4)
EOSINOPHIL NFR BLD AUTO: 1 % (ref 0.3–6.2)
ERYTHROCYTE [DISTWIDTH] IN BLOOD BY AUTOMATED COUNT: 14 % (ref 12.3–15.4)
GLOBULIN UR ELPH-MCNC: 3 GM/DL
GLUCOSE SERPL-MCNC: 100 MG/DL (ref 65–99)
HCT VFR BLD AUTO: 27.4 % (ref 37.5–51)
HGB BLD-MCNC: 9.2 G/DL (ref 13–17.7)
LYMPHOCYTES # BLD AUTO: 0.62 10*3/MM3 (ref 0.7–3.1)
LYMPHOCYTES NFR BLD AUTO: 15 % (ref 19.6–45.3)
MCH RBC QN AUTO: 31.3 PG (ref 26.6–33)
MCHC RBC AUTO-ENTMCNC: 33.6 G/DL (ref 31.5–35.7)
MCV RBC AUTO: 93.2 FL (ref 79–97)
MONOCYTES # BLD AUTO: 0.42 10*3/MM3 (ref 0.1–0.9)
MONOCYTES NFR BLD AUTO: 10.1 % (ref 5–12)
NEUTROPHILS NFR BLD AUTO: 3 10*3/MM3 (ref 1.7–7)
NEUTROPHILS NFR BLD AUTO: 72.4 % (ref 42.7–76)
PLATELET # BLD AUTO: 127 10*3/MM3 (ref 140–450)
PMV BLD AUTO: 10.9 FL (ref 6–12)
POTASSIUM SERPL-SCNC: 3.5 MMOL/L (ref 3.5–5.2)
POTASSIUM SERPL-SCNC: 3.6 MMOL/L (ref 3.5–5.2)
PROT SERPL-MCNC: 5.6 G/DL (ref 6–8.5)
RBC # BLD AUTO: 2.94 10*6/MM3 (ref 4.14–5.8)
SODIUM SERPL-SCNC: 135 MMOL/L (ref 136–145)
WBC NRBC COR # BLD: 4.14 10*3/MM3 (ref 3.4–10.8)

## 2022-08-09 PROCEDURE — 80053 COMPREHEN METABOLIC PANEL: CPT | Performed by: INTERNAL MEDICINE

## 2022-08-09 PROCEDURE — 97530 THERAPEUTIC ACTIVITIES: CPT

## 2022-08-09 PROCEDURE — 99232 SBSQ HOSP IP/OBS MODERATE 35: CPT | Performed by: INTERNAL MEDICINE

## 2022-08-09 PROCEDURE — 85025 COMPLETE CBC W/AUTO DIFF WBC: CPT | Performed by: INTERNAL MEDICINE

## 2022-08-09 PROCEDURE — 84132 ASSAY OF SERUM POTASSIUM: CPT | Performed by: INTERNAL MEDICINE

## 2022-08-09 RX ADMIN — Medication 10 ML: at 20:17

## 2022-08-09 RX ADMIN — OXYCODONE HYDROCHLORIDE 5 MG: 5 TABLET ORAL at 01:06

## 2022-08-09 RX ADMIN — LACTULOSE 30 G: 10 SOLUTION ORAL at 17:12

## 2022-08-09 RX ADMIN — LACTULOSE 30 G: 10 SOLUTION ORAL at 09:32

## 2022-08-09 RX ADMIN — FAMOTIDINE 20 MG: 20 TABLET ORAL at 09:31

## 2022-08-09 RX ADMIN — RIFAXIMIN 550 MG: 550 TABLET ORAL at 09:34

## 2022-08-09 RX ADMIN — RIFAXIMIN 550 MG: 550 TABLET ORAL at 20:17

## 2022-08-09 RX ADMIN — CARVEDILOL 12.5 MG: 12.5 TABLET, FILM COATED ORAL at 09:28

## 2022-08-09 RX ADMIN — ALLOPURINOL 100 MG: 100 TABLET ORAL at 09:28

## 2022-08-09 RX ADMIN — OXYCODONE HYDROCHLORIDE 5 MG: 5 TABLET ORAL at 18:46

## 2022-08-09 RX ADMIN — POTASSIUM CHLORIDE 40 MEQ: 750 TABLET, EXTENDED RELEASE ORAL at 04:27

## 2022-08-09 RX ADMIN — FAMOTIDINE 20 MG: 20 TABLET ORAL at 17:12

## 2022-08-09 RX ADMIN — CITALOPRAM 20 MG: 20 TABLET, FILM COATED ORAL at 09:29

## 2022-08-09 RX ADMIN — OXYCODONE HYDROCHLORIDE 5 MG: 5 TABLET ORAL at 06:25

## 2022-08-09 RX ADMIN — POTASSIUM CHLORIDE 40 MEQ: 750 TABLET, EXTENDED RELEASE ORAL at 09:43

## 2022-08-09 RX ADMIN — Medication 220 MG: at 17:12

## 2022-08-09 RX ADMIN — Medication 1 TABLET: at 09:33

## 2022-08-09 RX ADMIN — Medication 100 MG: at 09:36

## 2022-08-09 RX ADMIN — Medication 1 MG: at 09:34

## 2022-08-09 RX ADMIN — Medication 10 ML: at 09:35

## 2022-08-09 RX ADMIN — CARVEDILOL 12.5 MG: 12.5 TABLET, FILM COATED ORAL at 17:12

## 2022-08-09 RX ADMIN — OXYCODONE HYDROCHLORIDE 5 MG: 5 TABLET ORAL at 12:37

## 2022-08-09 RX ADMIN — LACTULOSE 30 G: 10 SOLUTION ORAL at 20:17

## 2022-08-09 NOTE — THERAPY TREATMENT NOTE
Patient Name: Macario Carpio  : 1970    MRN: [id number]                              Today's Date: 2022       Admit Date: 2022    Visit Dx:     ICD-10-CM ICD-9-CM   1. Hepatic encephalopathy (HCC)  K72.90 572.2   2. Uremic encephalopathy  G93.49 348.31    N19    3. Acute kidney injury (HCC)  N17.9 584.9   4. Hypomagnesemia  E83.42 275.2   5. Alcoholic cirrhosis, unspecified whether ascites present (HCC)  K70.30 571.2   6. Alcoholic cirrhosis of liver without ascites (HCC)  K70.30 571.2     Patient Active Problem List   Diagnosis   • Iron deficiency anemia   • Hepatic cirrhosis (HCC)   • Screening for malignant neoplasm of colon   • Abdominal aortic aneurysm (AAA) (HCC)   • Hepatitis C virus infection   • Gout   • Hypercholesterolemia   • Hepatic cirrhosis (HCC)   • Thoracic aortic aneurysm without rupture (HCC)   • Hypertensive disorder   • Hip pain   • Chronic pain   • Bicuspid aortic valve   • Arthritis of right hip   • Aortic valve regurgitation   • Anxiety   • Abdominal aortic aneurysm (AAA) (HCC)   • Hepatic cirrhosis (HCC)   • Hepatitis C virus infection   • Chronic pain   • Hepatic encephalopathy (HCC)   • Alcoholic cirrhosis of liver without ascites (HCC)   • YANNI (acute kidney injury) (HCC)   • Hypomagnesemia   • Tophaceous gout     Past Medical History:   Diagnosis Date   • Gout    • Hepatitis C      Past Surgical History:   Procedure Laterality Date   • ENDOSCOPY N/A 2022    Procedure: ESOPHAGOGASTRODUODENOSCOPY WITH COLD BIOPSIES, BAND PLACEMENT X 3;  Surgeon: Macario Dent MD;  Location: [facility] ENDOSCOPY;  Service: Gastroenterology;  Laterality: N/A;  PRE- CIRRHOSIS, EVAL OF VARICES  POST- GASTRIC ULCER, GASTRITIS. PORTAL HYPERTENSIVE GASTROPATHY, GASTRIC & ESOPHAGEAL VARICES   • TOE AMPUTATION     • TOTAL HIP ARTHROPLASTY Right 2020      General Information     Row Name 22 4533          Physical Therapy Time and Intention    Document Type therapy note (daily note)   -EB     Mode of Treatment individual therapy;physical therapy  -EB     Row Name 08/09/22 1557          General Information    Existing Precautions/Restrictions fall  -EB     Row Name 08/09/22 1557          Cognition    Orientation Status (Cognition) oriented to;person  -EB     Row Name 08/09/22 1557          Safety Issues, Functional Mobility    Safety Issues Affecting Function (Mobility) ability to follow commands;awareness of need for assistance;insight into deficits/self-awareness;judgment;problem-solving  -     Impairments Affecting Function (Mobility) endurance/activity tolerance;strength;range of motion (ROM);cognition;coordination;motor control;motor planning  -EB           User Key  (r) = Recorded By, (t) = Taken By, (c) = Cosigned By    Initials Name Provider Type     Mary Samaniego PTA Physical Therapist Assistant               Mobility     Row Name 08/09/22 1600          Bed Mobility    Supine-Sit [place] (Bed Mobility) minimum assist (75% patient effort)  -EB     Sit-Supine [place] (Bed Mobility) minimum assist (75% patient effort)  -EB     Assistive Device (Bed Mobility) bed rails;head of bed elevated  -EB     Comment, (Bed Mobility) max cueing and sequencing cues given. Pt able to move LEs towards EOB and off but very very slow. Pt not following commands and c/o of bilateral ue pain. Multiple cues for pt to use bed rail to assist with getting fully upright. Pt again not following any commands. eventually put pt's LEs back into bed.  -           User Key  (r) = Recorded By, (t) = Taken By, (c) = Cosigned By    Initials Name Provider Type    Mary Rice PTA Physical Therapist Assistant               Obj/Interventions    No documentation.                Goals/Plan    No documentation.                Clinical Impression     Row Name 08/09/22 1610          Pain    Pain Location - Side/Orientation Bilateral  -     Pain Location upper  -EB     Pain Location - extremity  -EB     Row  Name 08/09/22 1610          Plan of Care Review    Plan of Care Reviewed With patient  -EB     Progress no change  -EB     Outcome Evaluation Pt pleasent and agreeable to PT today. Pt had difficulty following commmands despite max verbal and sequencing cues. Pt was able to move LEs to EOB and off the bed with Arielle. Pt with very very slow movement.  Pt c/o bilateral UE pain max cues for pt to assist using bed rail however pt not following commands and would not try to reach over for the bedrail.  Pt was unable to get fully upright. to EOB due to pt's decreased ability to follow commands. Will continue to follow pt to assist with progressing mobility.  -EB     Row Name 08/09/22 1610          Therapy Assessment/Plan (PT)    Therapy Frequency (PT) 6 times/wk  -EB     Row Name 08/09/22 1610          Positioning and Restraints    Pre-Treatment Position in bed  -EB     Post Treatment Position bed  -EB     In Bed fowlers;call light within reach;encouraged to call for assist;exit alarm on  -EB           User Key  (r) = Recorded By, (t) = Taken By, (c) = Cosigned By    Initials Name Provider Type    Mary Rice PTA Physical Therapist Assistant               Outcome Measures     Row Name 08/09/22 1611          How much help from another person do you currently need...    Turning from your back to your side while in flat bed without using bedrails? 2  -EB     Moving from lying on back to sitting on the side of a flat bed without bedrails? 2  -EB     Moving to and from a bed to a chair (including a wheelchair)? 1  -EB     Standing up from a chair using your arms (e.g., wheelchair, bedside chair)? 1  -EB     Climbing 3-5 steps with a railing? 1  -EB     To walk in hospital room? 1  -EB     AM-PAC 6 Clicks Score (PT) 8  -EB     Highest level of mobility 3 --> Sat at edge of bed  -EB     Row Name 08/09/22 161          Modified Una Scale    Modified [place] Scale 4 - Moderately severe disability.  Unable to walk without  assistance, and unable to attend to own bodily needs without assistance.  -EB           User Key  (r) = Recorded By, (t) = Taken By, (c) = Cosigned By    Initials Name Provider Type    Mary Rice PTA Physical Therapist Assistant                             Physical Therapy Education                 Title: PT OT SLP Therapies (In Progress)     Topic: Physical Therapy (In Progress)     Point: Mobility training (In Progress)     Learning Progress Summary           Patient Acceptance, D,E, NR,NL by EB at 8/9/2022 1619    Acceptance, E,TB,D, VU,NR by  at 8/8/2022 1631    Acceptance, E,TB, VU by KL at 8/6/2022 0431    Acceptance, E, VU by KN at 8/5/2022 1515    Comment: Pt educated on safe positioning when seated EOB.    Acceptance, E, VU by  at 8/5/2022 1513                   Point: Home exercise program (In Progress)     Learning Progress Summary           Patient Acceptance, D,E, NR,NL by EB at 8/9/2022 1619    Acceptance, E,TB,D, VU,NR by  at 8/8/2022 1631    Acceptance, E,TB, VU by TALA at 8/6/2022 0431    Acceptance, E, VU by KN at 8/5/2022 1515    Comment: Pt educated on safe positioning when seated EOB.    Acceptance, E, VU by  at 8/5/2022 1513                   Point: Body mechanics (In Progress)     Learning Progress Summary           Patient Acceptance, D,E, NR,NL by EB at 8/9/2022 1619    Acceptance, E,TB,D, VU,NR by  at 8/8/2022 1631    Acceptance, E,TB, VU by KL at 8/6/2022 0431    Acceptance, E, VU by KN at 8/5/2022 1515    Comment: Pt educated on safe positioning when seated EOB.    Acceptance, E, VU by  at 8/5/2022 1513                   Point: Precautions (In Progress)     Learning Progress Summary           Patient Acceptance, D,E, NR,NL by EB at 8/9/2022 1619    Acceptance, E,TB,D, VU,NR by  at 8/8/2022 1631    Acceptance, E,TB, VU by KL at 8/6/2022 0431    Acceptance, E, VU by KN at 8/5/2022 1515    Comment: Pt educated on safe positioning when seated EOB.    Acceptance, E, VU by JANNETH  at 8/5/2022 1513                               User Key     Initials Effective Dates Name Provider Type Discipline    EB 06/16/21 -  Mary Samaniego PTA Physical Therapist Assistant PT    KL 06/16/21 -  Nola Atkinson, RN Registered Nurse Nurse     04/08/22 -  Nayely Glover, PT Physical Therapist PT    KN 08/02/22 -  Beto Smith OT Occupational Therapist OT     05/02/22 -  Valarie Haley, PT Physical Therapist PT              PT Recommendation and Plan     Plan of Care Reviewed With: patient  Progress: no change  Outcome Evaluation: Pt pleasent and agreeable to PT today. Pt had difficulty following commmands despite max verbal and sequencing cues. Pt was able to move LEs to EOB and off the bed with Arielle. Pt with very very slow movement.  Pt c/o bilateral UE pain max cues for pt to assist using bed rail however pt not following commands and would not try to reach over for the bedrail.  Pt was unable to get fully upright. to EOB due to pt's decreased ability to follow commands. Will continue to follow pt to assist with progressing mobility.     Time Calculation:    PT Charges     Row Name 08/09/22 1557             Time Calculation    Start Time 1347  -EB      Stop Time 1404  -EB      Time Calculation (min) 17 min  -EB      PT Received On 08/09/22  -EB      PT - Next Appointment 08/10/22  -EB              Time Calculation- PT    Total Timed Code Minutes- PT 17 minute(s)  -EB            User Key  (r) = Recorded By, (t) = Taken By, (c) = Cosigned By    Initials Name Provider Type    EB Mary Samaniego PTA Physical Therapist Assistant              Therapy Charges for Today     Code Description Service Date Service Provider Modifiers Qty    [id number]  PT THERAPEUTIC ACT EA 15 MIN 8/9/2022 Mary Samaniego PTA GP 1          PT G-Codes  Outcome Measure Options: AM-PAC 6 Clicks Basic Mobility (PT)  AM-PAC 6 Clicks Score (PT): 8  AM-PAC 6 Clicks Score (OT): 13  Modified Una Scale: 4 - Moderately severe  disability.  Unable to walk without assistance, and unable to attend to own bodily needs without assistance.    Mary Samaniego, PTA  8/9/2022

## 2022-08-09 NOTE — PROGRESS NOTES
[facility] did follow-up on patient.  Patient was clearer than over the weekend.  Patient was not easy to understand as he mumbles some but did not seem to be interested in any outpatient resources though he indicated earlier in his hospital stay that he was interested and was given resources by [facility].  [facility] will follow again tomorrow to encourage patient and make sure he has the available resources.

## 2022-08-09 NOTE — PLAN OF CARE
Goal Outcome Evaluation:              Outcome Evaluation: Pt is A&Ox4 and much more awake. PT worked with pt only to barely get to EOB due to lack of following commands. PRN Oxy given as needed. 1 lg BM this AM. Pt would like to go home upon d/c but family feels strongly about rehab. Pt educated on the importance of the need to reposition and work with PT. Pt is agreeable to work with PT but refusing repositioning many times throughout the day. Possible d/c tomorrow.

## 2022-08-09 NOTE — PROGRESS NOTES
[facility]  Inpatient Progress Note    Reason for Follow Up: Cirrhosis    Subjective     Interval History:   No overnight events, mental status more appropriate today.  Was hoping to go home today tells me he has strong family and social support at home and near home    Current Facility-Administered Medications:   •  acetaminophen (TYLENOL) tablet 650 mg, 650 mg, Oral, Q4H PRN, 650 mg at 08/08/22 2206 **OR** acetaminophen (TYLENOL) 160 MG/5ML solution 650 mg, 650 mg, Oral, Q4H PRN **OR** acetaminophen (TYLENOL) suppository 650 mg, 650 mg, Rectal, Q4H PRN, Foreign Wilkins MD  •  allopurinol (ZYLOPRIM) tablet 100 mg, 100 mg, Oral, Daily, Foreign Wilkins MD, 100 mg at 08/09/22 0928  •  cadexomer iodine (IODOSORB) 0.9 % gel 1 application, 1 application, Topical, Once per day on Mon Thu, Seferino Van MD, 1 application at 08/08/22 0830  •  carvedilol (COREG) tablet 12.5 mg, 12.5 mg, Oral, BID With Meals, Foreign Wilkins MD, 12.5 mg at 08/09/22 0928  •  citalopram (CeleXA) tablet 20 mg, 20 mg, Oral, Daily, Foreign Wilkins MD, 20 mg at 08/09/22 0929  •  famotidine (PEPCID) tablet 20 mg, 20 mg, Oral, BID AC, Seferino Van MD, 20 mg at 08/09/22 0931  •  multivitamin (THERAGRAN) tablet 1 tablet, 1 tablet, Oral, Daily, 1 tablet at 08/09/22 0933 **AND** folic acid (FOLVITE) tablet 1 mg, 1 mg, Oral, Daily, Seferino Van MD, 1 mg at 08/09/22 0934  •  lactulose (CHRONULAC) 10 GM/15ML solution 30 g, 30 g, Oral, TID, Seferino Van MD, 30 g at 08/09/22 0932  •  Magnesium Sulfate 2 gram Bolus, followed by 8 gram infusion (total Mg dose 10 grams)- Mg less than or equal to 1mg/dL, 2 g, Intravenous, PRN **OR** Magnesium Sulfate 2 gram / 50mL Infusion (GIVE X 3 BAGS TO EQUAL 6GM TOTAL DOSE) - Mg 1.1 - 1.5 mg/dl, 2 g, Intravenous, PRN, Last Rate: 25 mL/hr at 08/08/22 0438, 2 g at 08/08/22 0438 **OR** Magnesium Sulfate 4 gram infusion- Mg 1.6-1.9 mg/dL, 4 g, Intravenous,  PRN, Sanjay Diane APRN  •  nitroglycerin (NITROSTAT) SL tablet 0.4 mg, 0.4 mg, Sublingual, Q5 Min PRN, Foreign Wilkins MD  •  ondansetron (ZOFRAN) tablet 4 mg, 4 mg, Oral, Q6H PRN, 4 mg at 08/02/22 0316 **OR** ondansetron (ZOFRAN) injection 4 mg, 4 mg, Intravenous, Q6H PRN, Foreign Wilkins MD, 4 mg at 08/01/22 0345  •  oxyCODONE (ROXICODONE) immediate release tablet 5 mg, 5 mg, Oral, Q6H PRN, 5 mg at 08/09/22 0625 **OR** [DISCONTINUED] oxyCODONE (ROXICODONE) immediate release tablet 10 mg, 10 mg, Oral, Q6H PRN, Seferino Van MD  •  potassium & sodium phosphates (PHOS-NAK) 280-160-250 MG packet - for Phosphorus less than 1.25 mg/dL, 2 packet, Oral, Q6H PRN **OR** potassium & sodium phosphates (PHOS-NAK) 280-160-250 MG packet - for Phosphorus 1.25 - 2.5 mg/dL, 2 packet, Oral, Q6H PRN, Seferino Van MD, 2 packet at 08/06/22 1359  •  potassium chloride (K-DUR,KLOR-CON) ER tablet 40 mEq, 40 mEq, Oral, PRN, 40 mEq at 08/09/22 0943 **OR** potassium chloride (KLOR-CON) packet 40 mEq, 40 mEq, Oral, PRN **OR** potassium chloride 10 mEq in 100 mL IVPB, 10 mEq, Intravenous, Q1H PRN, Seferino Van MD  •  riFAXIMin (XIFAXAN) tablet 550 mg, 550 mg, Oral, Q12H, Seferino Van MD, 550 mg at 08/09/22 0934  •  [COMPLETED] Insert peripheral IV, , , Once **AND** sodium chloride 0.9 % flush 10 mL, 10 mL, Intravenous, PRN, Tomasz Robles MD  •  sodium chloride 0.9 % flush 10 mL, 10 mL, Intravenous, Q12H, Foreign Wilkins MD, 10 mL at 08/09/22 0935  •  sodium chloride 0.9 % flush 10 mL, 10 mL, Intravenous, PRN, Foreign Wilkins MD  •  thiamine (VITAMIN B-1) tablet 100 mg, 100 mg, Oral, Daily, Seferino Van MD, 100 mg at 08/09/22 0936  •  zinc sulfate (ZINCATE) capsule 220 mg, 220 mg, Oral, Daily, Foreign Wilkins MD, 220 mg at 08/08/22 0829  Review of Systems:    There is weakness and fatigue all other systems reviewed and negative    Objective     Vital Signs  Temp:  [97.9 °F  (36.6 °C)-98.3 °F (36.8 °C)] 98.3 °F (36.8 °C)  Heart Rate:  [60-69] 69  Resp:  [18] 18  BP: (123-132)/(69-83) 127/75  Body mass index is 30.54 kg/m².    Intake/Output Summary (Last 24 hours) at 8/9/2022 1117  Last data filed at 8/9/2022 0827  Gross per 24 hour   Intake 720 ml   Output 201 ml   Net 519 ml     I/O this shift:  In: 360 [P.O.:360]  Out: -      Physical Exam:   General: patient awake, alert and cooperative   Eyes: Normal lids and lashes, no scleral icterus   Neck: supple, normal ROM   Skin: warm and dry, not jaundiced   Cardiovascular: regular rhythm and rate, no murmurs auscultated   Pulm: clear to auscultation bilaterally, regular and unlabored   Abdomen: soft, nontender, nondistended; normal bowel sounds   Extremities: no rash or edema   Psychiatric: Normal mood and behavior; memory intact     Results Review:     I reviewed the patient's new clinical results.    Results from last 7 days   Lab Units 08/09/22  0643 08/08/22  0646 08/07/22  1111   WBC 10*3/mm3 4.14 6.02 8.25   HEMOGLOBIN g/dL 9.2* 9.2* 9.4*   HEMATOCRIT % 27.4* 26.4* 26.8*   PLATELETS 10*3/mm3 127* 129* 119*     Results from last 7 days   Lab Units 08/09/22  0643 08/08/22  0646 08/07/22  1111   SODIUM mmol/L 135* 134* 135*   POTASSIUM mmol/L 3.5 3.0* 3.5   CHLORIDE mmol/L 105 105 102   CO2 mmol/L 20.0* 21.0* 20.6*   BUN mg/dL 12 15 15   CREATININE mg/dL 0.54* 0.57* 0.60*   CALCIUM mg/dL 8.6 8.7 8.4*   BILIRUBIN mg/dL 0.8 1.1 1.3*   ALK PHOS U/L 124* 124* 111   ALT (SGPT) U/L 60* 60* 22   AST (SGOT) U/L 177* 225* 56*   GLUCOSE mg/dL 100* 97 121*     Results from last 7 days   Lab Units 08/03/22  0707   INR  1.27*     Lab Results   Lab Value Date/Time    LIPASE 375 (H) 08/05/2022 1058    LIPASE 609 (H) 08/04/2022 0857       Radiology:  US Gallbladder   Final Result   Multiple gallstones within the gallbladder. No appreciable wall   thickening or pericholecystic fluid       This report was finalized on 8/3/2022 7:17 PM by Dr. Aaron EASON  ADITYA Novak.          CT Abdomen Pelvis Without Contrast   Final Result   1. Cholelithiasis with gallbladder wall thickening somewhat limited by   subjacent motion. Although findings may be the sequela of hepatic   dysfunction, recommend ultrasound and/or HIDA scan if there is clinical   concern for cholecystitis.   2. Morphologic changes of cirrhosis with portal hypertension with   splenomegaly and portosystemic shunting as described above.   3. Nonobstructing nephrolithiasis, left greater than right.   4. Please see above for additional findings/recommendations.       This report was finalized on 8/3/2022 11:44 AM by Dr. Carlos Alberto De Anda M.D.          CT Head Without Contrast   Final Result   No acute process identified. Further evaluation could be   performed with an MRI examination of the brain as indicated.       Radiation dose reduction techniques were utilized, including automated   exposure control and exposure modulation based on body size.       This report was finalized on 8/1/2022 7:10 AM by Dr. Juan Boswell M.D.          CT Abdomen Pelvis Without Contrast   Final Result         Electronically signed by Donal Vigil MD on 07-31-22 at 1923      XR Chest 1 View   Final Result          Assessment & Plan     Patient Active Problem List   Diagnosis   • Iron deficiency anemia   • Hepatic cirrhosis (HCC)   • Screening for malignant neoplasm of colon   • Abdominal aortic aneurysm (AAA) (HCC)   • Hepatitis C virus infection   • Gout   • Hypercholesterolemia   • Hepatic cirrhosis (HCC)   • Thoracic aortic aneurysm without rupture (HCC)   • Hypertensive disorder   • Hip pain   • Chronic pain   • Bicuspid aortic valve   • Arthritis of right hip   • Aortic valve regurgitation   • Anxiety   • Abdominal aortic aneurysm (AAA) (HCC)   • Hepatic cirrhosis (HCC)   • Hepatitis C virus infection   • Chronic pain   • Hepatic encephalopathy (HCC)   • Alcoholic cirrhosis of liver without ascites (HCC)   • YANNI (acute kidney  injury) (HCC)   • Hypomagnesemia   • Tophaceous gout       Assessment:   1. Hepatic encephalopathy  2. Cirrhosis with history of alcohol abuse as well as hepatitis C  3. Anemia, no active bleeding  4. Cholelithiasis  5. Esophageal varices banded, PHG           Plan:   Continue lactulose/Xifaxin for PSE  2gm low NA diet  General surgery note reviewed, not a good candidate for CCY  Beta blocker initiated for esophageal varices, esophageal varices banded this past weekend  Pepcid for gut protection  GI okay with discharge to rehab or home if there is enough support at home     I discussed the patients findings and my recommendations with patient and nursing staff.    Wyatt Moulton MD

## 2022-08-09 NOTE — PROGRESS NOTES
Name: Macario Carpio ADMIT: 2022   : 1970  PCP: KenanAndrew, APRN    MRN: [id number] LOS: 9 days   AGE/SEX: 51 y.o. male  ROOM: [facility]   Subjective   Chief Complaint   Patient presents with   • Altered Mental Status      Patient is lying in the bed and appears weak and lethargic but he is more alert and answering questions reasonably well.    Objective   Vital Signs  Temp:  [97.9 °F (36.6 °C)-98.3 °F (36.8 °C)] 98.3 °F (36.8 °C)  Heart Rate:  [60-74] 74  Resp:  [16-18] 16  BP: (122-131)/(69-83) 122/76  SpO2:  [94 %-97 %] 97 %  on   ;   Device (Oxygen Therapy): room air  Body mass index is 30.54 kg/m².    Physical Exam  Vitals and nursing note reviewed.   Constitutional:       Appearance: He is ill-appearing (chronically). He is not diaphoretic.   HENT:      Head: Normocephalic and atraumatic.      Mouth/Throat:      Mouth: Mucous membranes are moist.   Eyes:      General:         Right eye: No discharge.         Left eye: No discharge.      Conjunctiva/sclera: Conjunctivae normal.      Pupils: Pupils are equal, round, and reactive to light.   Cardiovascular:      Rate and Rhythm: Normal rate and regular rhythm.      Pulses: Normal pulses.   Pulmonary:      Effort: Pulmonary effort is normal.      Breath sounds: Decreased breath sounds present. No wheezing or rhonchi.   Abdominal:      General: There is no distension.      Palpations: Abdomen is soft.      Tenderness: There is no abdominal tenderness. There is no guarding or rebound.   Musculoskeletal:         General: Swelling, tenderness and deformity present.      Cervical back: Normal range of motion and neck supple. No tenderness.      Right lower leg: No edema.      Left lower leg: No edema.      Comments: Diffuse tophaceous gout. R toe prior amputation and chronic wound dressed.   Skin:     General: Skin is warm and dry.   Neurological:      Mental Status: He is alert and oriented to person, place, and time.      Comments: Globally weak    Psychiatric:         Cognition and Memory: Cognition is not impaired. Memory is not impaired.         Results Review:       I reviewed the patient's new clinical results.   I reviewed imaging/other test results and agree with interpretation.   I reviewed telemetry, sinus.          Results from last 7 days   Lab Units 08/09/22  0643 08/08/22  0646 08/07/22  1111 08/06/22  0305   WBC 10*3/mm3 4.14 6.02 8.25 10.18   HEMOGLOBIN g/dL 9.2* 9.2* 9.4* 9.7*   PLATELETS 10*3/mm3 127* 129* 119* 89*     Results from last 7 days   Lab Units 08/09/22  0643 08/08/22  0646 08/07/22  1111 08/06/22  0304   SODIUM mmol/L 135* 134* 135* 130*   POTASSIUM mmol/L 3.5 3.0* 3.5 3.9   CHLORIDE mmol/L 105 105 102 101   CO2 mmol/L 20.0* 21.0* 20.6* 18.0*   BUN mg/dL 12 15 15 9   CREATININE mg/dL 0.54* 0.57* 0.60* 0.61*   GLUCOSE mg/dL 100* 97 121* 102*   Estimated Creatinine Clearance: 194.3 mL/min (A) (by C-G formula based on SCr of 0.54 mg/dL (L)).  Results from last 7 days   Lab Units 08/09/22  0643 08/08/22  1949 08/08/22  0646 08/07/22  1111 08/06/22  1833 08/06/22  1039 08/06/22  0304 08/05/22  1541 08/05/22  1058   CALCIUM mg/dL 8.6  --  8.7 8.4*  --   --  8.0*  --  8.0*   ALBUMIN g/dL 2.60*  --  2.70* 2.70*  --   --  3.00*  --  2.70*   MAGNESIUM mg/dL  --   --  2.4 1.4*  --   --  1.7  --  1.1*   PHOSPHORUS mg/dL  --  2.9  --  2.2* 2.8 2.1* 2.1*   < > 1.9*    < > = values in this interval not displayed.     Results from last 7 days   Lab Units 08/03/22  0707   INR  1.27*        allopurinol, 100 mg, Oral, Daily  cadexomer iodine, 1 application, Topical, Once per day on Mon Thu  carvedilol, 12.5 mg, Oral, BID With Meals  citalopram, 20 mg, Oral, Daily  famotidine, 20 mg, Oral, BID AC  multivitamin, 1 tablet, Oral, Daily   And  folic acid, 1 mg, Oral, Daily  lactulose, 30 g, Oral, TID  rifAXIMin, 550 mg, Oral, Q12H  sodium chloride, 10 mL, Intravenous, Q12H  thiamine, 100 mg, Oral, Daily  zinc sulfate, 220 mg, Oral, Daily       Diet  Regular    Assessment & Plan      Active Hospital Problems    Diagnosis  POA   • **Hepatic encephalopathy (HCC) [K72.90]  Yes   • 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       · Hepatic encephalopathy/alcoholic cirrhosis: Mental status has been improving and will continue with Xifaxan as well as lactulose.  Underwent EGD which revealed varices along with red lizet sign but no evidence of any active bleeding and underwent banding as well.  We will continue with beta-blockers as well as famotidine.    · Pancreatitis: Abdominal pain has been improving and is ultrasound revealed gallstones.  Surgery did evaluate and no need for surgery as an inpatient basis.  Tolerating solid diet.  · YANNI on CKD: Improved.  Nephrology evaluated.  · Alcohol abuse/HCV: HCV viral load was not detected.  Currently not going through any withdrawal and continue with multivitamins and supplements.  Counseled to quit drinking alcohol.  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  Continue with allopurinol.  · Low magnesium secondary to alcohol abuse: On replacement protocol.  · Low phosphorus: Replace  · Hyponatremia: Improving and sodium is better at 135 and continue with free water restriction.  · Hypertension: On Coreg and blood pressure is reasonably well controlled.  · Chronic wound right foot: Dressed.  Wound care following.  · Prophylaxis: SCD.  Platelet count stabilizing.  Status post EGD with banding earlier during his hospital stay.  · Disposition: Patient is a two-person assist and would benefit from skilled nursing facility however patient and wife both declining and would would like to come and see him today and  thereafter to decide whether to go to skilled nursing facility or go home.    Ino Chavira MD  [facility]  08/09/22  14:12 EDT    Dictated portions using Dragon dictation software.    During the entire encounter, I was wearing recommended PPE including face mask and eye protection. Hand sanitization was performed prior to entering room and upon exit.

## 2022-08-09 NOTE — PLAN OF CARE
Goal Outcome Evaluation:         Patient continues to be more alert. Oxycodone given for pain. VSS. One bowel movement. Per karla Funes to modify diet order to Regular diet. Potassium replaced per protocol.  All needs met.

## 2022-08-09 NOTE — PLAN OF CARE
Goal Outcome Evaluation:  Plan of Care Reviewed With: patient        Progress: no change  Outcome Evaluation: Pt pleasent and agreeable to PT today. Pt had difficulty following commmands despite max verbal and sequencing cues. Pt was able to move LEs to EOB and off the bed with Arielle. Pt with very very slow movement.  Pt c/o bilateral UE pain max cues for pt to assist using bed rail however pt not following commands and would not try to reach over for the bedrail.  Pt was unable to get fully upright. to EOB due to pt's decreased ability to follow commands. Will continue to follow pt to assist with progressing mobility.

## 2022-08-09 NOTE — CASE MANAGEMENT/SOCIAL WORK
Continued Stay Note  [facility]     Patient Name: Macario Carpio  MRN: [id number]  Today's Date: 8/9/2022    Admit Date: 7/31/2022     Discharge Plan     Row Name 08/09/22 1512       Plan    Plan SNF- referral to [place] pending.    Plan Comments Spoke with patient at bedside who stated he was agreeable to discharging to [facility]. Spoke with [facility] who stated they are unable to accept. Patient agreeable to referral to [facility]. Referral sent in [facility] and called to [facility]- referral pending. Patient will need [employer] MCR pre-cert. CCP to follow. Mike EPPERSON    Row Name 08/09/22 9045       Plan    Plan Comments Spoke with patient at bedside who stated he was agreeable to discharing to [facility]. Called [facility] to follow up on referral that was sent 8/               Discharge Codes    No documentation.               Expected Discharge Date and Time     Expected Discharge Date Expected Discharge Time    Aug 11, 2022             Mike Johnson RN

## 2022-08-10 LAB
ALBUMIN SERPL-MCNC: 2.6 G/DL (ref 3.5–5.2)
ALBUMIN/GLOB SERPL: 0.9 G/DL
ALP SERPL-CCNC: 139 U/L (ref 39–117)
ALT SERPL W P-5'-P-CCNC: 60 U/L (ref 1–41)
ANION GAP SERPL CALCULATED.3IONS-SCNC: 9.7 MMOL/L (ref 5–15)
AST SERPL-CCNC: 141 U/L (ref 1–40)
BASOPHILS # BLD AUTO: 0.04 10*3/MM3 (ref 0–0.2)
BASOPHILS NFR BLD AUTO: 0.8 % (ref 0–1.5)
BILIRUB SERPL-MCNC: 0.8 MG/DL (ref 0–1.2)
BUN SERPL-MCNC: 7 MG/DL (ref 6–20)
BUN/CREAT SERPL: 14 (ref 7–25)
CALCIUM SPEC-SCNC: 8.5 MG/DL (ref 8.6–10.5)
CHLORIDE SERPL-SCNC: 107 MMOL/L (ref 98–107)
CO2 SERPL-SCNC: 18.3 MMOL/L (ref 22–29)
CREAT SERPL-MCNC: 0.5 MG/DL (ref 0.76–1.27)
DEPRECATED RDW RBC AUTO: 45.4 FL (ref 37–54)
EGFRCR SERPLBLD CKD-EPI 2021: 123.5 ML/MIN/1.73
EOSINOPHIL # BLD AUTO: 0.06 10*3/MM3 (ref 0–0.4)
EOSINOPHIL NFR BLD AUTO: 1.2 % (ref 0.3–6.2)
ERYTHROCYTE [DISTWIDTH] IN BLOOD BY AUTOMATED COUNT: 13.6 % (ref 12.3–15.4)
GLOBULIN UR ELPH-MCNC: 3 GM/DL
GLUCOSE SERPL-MCNC: 96 MG/DL (ref 65–99)
HCT VFR BLD AUTO: 27.8 % (ref 37.5–51)
HGB BLD-MCNC: 9.5 G/DL (ref 13–17.7)
IMM GRANULOCYTES # BLD AUTO: 0.03 10*3/MM3 (ref 0–0.05)
IMM GRANULOCYTES NFR BLD AUTO: 0.6 % (ref 0–0.5)
LAB AP CASE REPORT: NORMAL
LAB AP DIAGNOSIS COMMENT: NORMAL
LYMPHOCYTES # BLD AUTO: 0.75 10*3/MM3 (ref 0.7–3.1)
LYMPHOCYTES NFR BLD AUTO: 15.2 % (ref 19.6–45.3)
MCH RBC QN AUTO: 31 PG (ref 26.6–33)
MCHC RBC AUTO-ENTMCNC: 34.2 G/DL (ref 31.5–35.7)
MCV RBC AUTO: 90.8 FL (ref 79–97)
MONOCYTES # BLD AUTO: 0.64 10*3/MM3 (ref 0.1–0.9)
MONOCYTES NFR BLD AUTO: 13 % (ref 5–12)
NEUTROPHILS NFR BLD AUTO: 3.42 10*3/MM3 (ref 1.7–7)
NEUTROPHILS NFR BLD AUTO: 69.2 % (ref 42.7–76)
NRBC BLD AUTO-RTO: 0 /100 WBC (ref 0–0.2)
PATH REPORT.FINAL DX SPEC: NORMAL
PATH REPORT.GROSS SPEC: NORMAL
PLATELET # BLD AUTO: 124 10*3/MM3 (ref 140–450)
PMV BLD AUTO: 10.7 FL (ref 6–12)
POTASSIUM SERPL-SCNC: 3.1 MMOL/L (ref 3.5–5.2)
PROT SERPL-MCNC: 5.6 G/DL (ref 6–8.5)
RBC # BLD AUTO: 3.06 10*6/MM3 (ref 4.14–5.8)
SODIUM SERPL-SCNC: 135 MMOL/L (ref 136–145)
WBC NRBC COR # BLD: 4.94 10*3/MM3 (ref 3.4–10.8)

## 2022-08-10 PROCEDURE — 80053 COMPREHEN METABOLIC PANEL: CPT | Performed by: INTERNAL MEDICINE

## 2022-08-10 PROCEDURE — 99232 SBSQ HOSP IP/OBS MODERATE 35: CPT | Performed by: INTERNAL MEDICINE

## 2022-08-10 PROCEDURE — 85025 COMPLETE CBC W/AUTO DIFF WBC: CPT | Performed by: INTERNAL MEDICINE

## 2022-08-10 RX ADMIN — Medication 10 ML: at 21:00

## 2022-08-10 RX ADMIN — Medication 1 MG: at 09:21

## 2022-08-10 RX ADMIN — ALLOPURINOL 100 MG: 100 TABLET ORAL at 09:21

## 2022-08-10 RX ADMIN — LACTULOSE 30 G: 10 SOLUTION ORAL at 15:22

## 2022-08-10 RX ADMIN — Medication 10 ML: at 09:21

## 2022-08-10 RX ADMIN — CITALOPRAM 20 MG: 20 TABLET, FILM COATED ORAL at 09:21

## 2022-08-10 RX ADMIN — RIFAXIMIN 550 MG: 550 TABLET ORAL at 09:21

## 2022-08-10 RX ADMIN — FAMOTIDINE 20 MG: 20 TABLET ORAL at 18:09

## 2022-08-10 RX ADMIN — LACTULOSE 30 G: 10 SOLUTION ORAL at 23:04

## 2022-08-10 RX ADMIN — RIFAXIMIN 550 MG: 550 TABLET ORAL at 21:01

## 2022-08-10 RX ADMIN — CARVEDILOL 12.5 MG: 12.5 TABLET, FILM COATED ORAL at 09:21

## 2022-08-10 RX ADMIN — Medication 100 MG: at 09:21

## 2022-08-10 RX ADMIN — POTASSIUM CHLORIDE 40 MEQ: 750 TABLET, EXTENDED RELEASE ORAL at 21:00

## 2022-08-10 RX ADMIN — Medication 220 MG: at 09:21

## 2022-08-10 RX ADMIN — OXYCODONE HYDROCHLORIDE 5 MG: 5 TABLET ORAL at 15:20

## 2022-08-10 RX ADMIN — CARVEDILOL 12.5 MG: 12.5 TABLET, FILM COATED ORAL at 18:09

## 2022-08-10 RX ADMIN — POTASSIUM CHLORIDE 40 MEQ: 750 TABLET, EXTENDED RELEASE ORAL at 15:20

## 2022-08-10 RX ADMIN — OXYCODONE HYDROCHLORIDE 5 MG: 5 TABLET ORAL at 09:21

## 2022-08-10 RX ADMIN — POTASSIUM CHLORIDE 40 MEQ: 750 TABLET, EXTENDED RELEASE ORAL at 09:39

## 2022-08-10 RX ADMIN — OXYCODONE HYDROCHLORIDE 5 MG: 5 TABLET ORAL at 21:26

## 2022-08-10 RX ADMIN — OXYCODONE HYDROCHLORIDE 5 MG: 5 TABLET ORAL at 03:18

## 2022-08-10 RX ADMIN — Medication 1 TABLET: at 09:21

## 2022-08-10 RX ADMIN — FAMOTIDINE 20 MG: 20 TABLET ORAL at 06:14

## 2022-08-10 NOTE — NURSING NOTE
"Access follow-up regards ETOH:    Pt is found awake and RIB. He is oriented for the most part - cannot name this specific hospital, names only one correct state bordering [place]. He denied current s/s withdrawal. Last CIWA taken was 1. He rated current craving for ETOH 0/10. He rated current depression 1/10, anxiety 2/10. He denied current SI/HI/AVH. He denied issues with appetite or sleep.    He acknowledged previous [facility] resources given; however, stated he is not interested in any type of treatment as \"just want to go fishing and hang out with family\". He stated AA mtgs \"made me want to drink\". Inquired how pt plans to maintain sobriety after discharge - \"I got some people that don't drink and I'll hang out with them\". Explained pt's risk for relapse and inquired regards pt's plan when not with friends, and he stated alcohol \"makes me feel weird and it scared me\" and this is his motivation to stop drinking. Encouraged pt to avail himself to [facility] resources given.    Pt stated he is looking forward to discharge possibly today.       "

## 2022-08-10 NOTE — PROGRESS NOTES
Name: Macario Carpio ADMIT: 2022   : 1970  PCP: Kenan Andrew JABARI, CALLUM    MRN: [id number] LOS: 10 days   AGE/SEX: 51 y.o. male  ROOM: [facility]   Subjective   Chief Complaint   Patient presents with   • Altered Mental Status      Patient is lying in the bed and appears weak and lethargic but he is more alert and answering questions reasonably well.    Objective   Vital Signs  Temp:  [97.2 °F (36.2 °C)-100 °F (37.8 °C)] 97.2 °F (36.2 °C)  Heart Rate:  [74-84] 78  Resp:  [16-20] 18  BP: (122-143)/(72-85) 138/77  SpO2:  [92 %-97 %] 92 %  on   ;   Device (Oxygen Therapy): room air  Body mass index is 30.11 kg/m².    Physical Exam  Vitals and nursing note reviewed.   Constitutional:       Appearance: He is ill-appearing (chronically). He is not diaphoretic.   HENT:      Head: Normocephalic and atraumatic.      Mouth/Throat:      Mouth: Mucous membranes are moist.   Eyes:      General:         Right eye: No discharge.         Left eye: No discharge.      Conjunctiva/sclera: Conjunctivae normal.      Pupils: Pupils are equal, round, and reactive to light.   Cardiovascular:      Rate and Rhythm: Normal rate and regular rhythm.      Pulses: Normal pulses.   Pulmonary:      Effort: Pulmonary effort is normal.      Breath sounds: Decreased breath sounds present. No wheezing or rhonchi.   Abdominal:      General: There is no distension.      Palpations: Abdomen is soft.      Tenderness: There is no abdominal tenderness. There is no guarding or rebound.   Musculoskeletal:         General: Swelling, tenderness and deformity present.      Cervical back: Normal range of motion and neck supple. No tenderness.      Right lower leg: No edema.      Left lower leg: No edema.      Comments: Diffuse tophaceous gout. R toe prior amputation and chronic wound dressed.   Skin:     General: Skin is warm and dry.   Neurological:      Mental Status: He is alert and oriented to person, place, and time.      Comments: Globally weak    Psychiatric:         Cognition and Memory: Cognition is not impaired. Memory is not impaired.         Results Review:       I reviewed the patient's new clinical results.   I reviewed imaging/other test results and agree with interpretation.   I reviewed telemetry, sinus.          Results from last 7 days   Lab Units 08/10/22  0552 08/09/22  0643 08/08/22  0646 08/07/22  1111   WBC 10*3/mm3 4.94 4.14 6.02 8.25   HEMOGLOBIN g/dL 9.5* 9.2* 9.2* 9.4*   PLATELETS 10*3/mm3 124* 127* 129* 119*     Results from last 7 days   Lab Units 08/10/22  0552 08/09/22  1415 08/09/22  0643 08/08/22  0646 08/07/22  1111   SODIUM mmol/L 135*  --  135* 134* 135*   POTASSIUM mmol/L 3.1* 3.6 3.5 3.0* 3.5   CHLORIDE mmol/L 107  --  105 105 102   CO2 mmol/L 18.3*  --  20.0* 21.0* 20.6*   BUN mg/dL 7  --  12 15 15   CREATININE mg/dL 0.50*  --  0.54* 0.57* 0.60*   GLUCOSE mg/dL 96  --  100* 97 121*   Estimated Creatinine Clearance: 208.4 mL/min (A) (by C-G formula based on SCr of 0.5 mg/dL (L)).  Results from last 7 days   Lab Units 08/10/22  0552 08/09/22  0643 08/08/22  1949 08/08/22  0646 08/07/22  1111 08/06/22  1833 08/06/22  1039 08/06/22  0304 08/05/22  1541 08/05/22  1058   CALCIUM mg/dL 8.5* 8.6  --  8.7 8.4*  --   --  8.0*  --  8.0*   ALBUMIN g/dL 2.60* 2.60*  --  2.70* 2.70*  --   --  3.00*  --  2.70*   MAGNESIUM mg/dL  --   --   --  2.4 1.4*  --   --  1.7  --  1.1*   PHOSPHORUS mg/dL  --   --  2.9  --  2.2* 2.8 2.1* 2.1*   < > 1.9*    < > = values in this interval not displayed.            allopurinol, 100 mg, Oral, Daily  cadexomer iodine, 1 application, Topical, Once per day on Mon Thu  carvedilol, 12.5 mg, Oral, BID With Meals  citalopram, 20 mg, Oral, Daily  famotidine, 20 mg, Oral, BID AC  multivitamin, 1 tablet, Oral, Daily   And  folic acid, 1 mg, Oral, Daily  lactulose, 30 g, Oral, TID  rifAXIMin, 550 mg, Oral, Q12H  sodium chloride, 10 mL, Intravenous, Q12H  thiamine, 100 mg, Oral, Daily  zinc sulfate, 220 mg, Oral,  Daily       Diet Regular    Assessment & Plan      Active Hospital Problems    Diagnosis  POA   • **Hepatic encephalopathy (HCC) [K72.90]  Yes   • 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       · Hepatic encephalopathy/alcoholic cirrhosis: Mental status has been improving and will continue with Xifaxan as well as lactulose.  Underwent EGD which revealed varices along with red lizet sign but no evidence of any active bleeding and underwent banding as well.  We will continue with beta-blockers as well as famotidine.    · Pancreatitis: Abdominal pain has been improving and is ultrasound revealed gallstones.  Surgery did evaluate and no need for surgery as an inpatient basis.  Tolerating solid diet.  · YANNI on CKD: Improved.  Nephrology evaluated.  · Alcohol abuse/HCV: HCV viral load was not detected.  Currently not going through any withdrawal and continue with multivitamins and supplements.  Counseled to quit drinking alcohol.  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  Continue with allopurinol.  · Low magnesium secondary to alcohol abuse: On replacement protocol.  · Low phosphorus: Replace  · Hyponatremia: Improving and sodium is better at 135 and continue with free water restriction.  · Hypertension: On Coreg and blood pressure is reasonably well controlled.  · Chronic wound right foot: Dressed.  Wound care following.  · Prophylaxis: SCD.  Platelet count stabilizing.  Status post EGD with banding earlier during his hospital stay.  · Disposition: Patient is a two-person assist and will benefit from a rehab placement.  Patient and family are in agreement and awaiting bed availability.    Ino Burton  MD Fan  [facility]  08/10/22  13:26 EDT    Dictated portions using Dragon dictation software.    During the entire encounter, I was wearing recommended PPE including face mask and eye protection. Hand sanitization was performed prior to entering room and upon exit.

## 2022-08-10 NOTE — CASE MANAGEMENT/SOCIAL WORK
Continued Stay Note  [facility]     Patient Name: Macario Carpio  MRN: [id number]  Today's Date: 8/10/2022    Admit Date: 7/31/2022     Discharge Plan     Row Name 08/10/22 1603       Plan    Plan SNF - [facility] pending bed availability and will need pre-cert.    Patient/Family in Agreement with Plan yes    Plan Comments Spoke with Tate/[facility] who stated patient is accepted at [facility], however they do not have a bed at this time. She is checking other facilities in the area to determine bed availability and will follow up in am. CCP to follow. Mike EPPERSON               Discharge Codes    No documentation.               Expected Discharge Date and Time     Expected Discharge Date Expected Discharge Time    Aug 11, 2022             Mike Johnson RN

## 2022-08-10 NOTE — PLAN OF CARE
Goal Outcome Evaluation:  Plan of Care Reviewed With: patient  Progress: no change  Outcome Evaluation: All needs met. Assist x 2 w/ turns. Large BMs tonight. Regular diet. VSS. Oriented x 4. RA. NSR. Oxycodone PRN. WCTM.

## 2022-08-10 NOTE — PROGRESS NOTES
[facility]  Inpatient Progress Note    Reason for Follow Up: Cirrhosis    Subjective     Interval History:   Appropriate this morning mental status clear, having plenty of bowel movements on lactulose    Current Facility-Administered Medications:   •  acetaminophen (TYLENOL) tablet 650 mg, 650 mg, Oral, Q4H PRN, 650 mg at 08/08/22 2206 **OR** acetaminophen (TYLENOL) 160 MG/5ML solution 650 mg, 650 mg, Oral, Q4H PRN **OR** acetaminophen (TYLENOL) suppository 650 mg, 650 mg, Rectal, Q4H PRN, Foreign Wilkins MD  •  allopurinol (ZYLOPRIM) tablet 100 mg, 100 mg, Oral, Daily, Foreign Wilkins MD, 100 mg at 08/10/22 0921  •  cadexomer iodine (IODOSORB) 0.9 % gel 1 application, 1 application, Topical, Once per day on Mon Thu, Seferino Van MD, 1 application at 08/08/22 0830  •  carvedilol (COREG) tablet 12.5 mg, 12.5 mg, Oral, BID With Meals, Foreign Wilkins MD, 12.5 mg at 08/10/22 0921  •  citalopram (CeleXA) tablet 20 mg, 20 mg, Oral, Daily, Foreign Wilkins MD, 20 mg at 08/10/22 0921  •  famotidine (PEPCID) tablet 20 mg, 20 mg, Oral, BID AC, Seferino Van MD, 20 mg at 08/10/22 0614  •  multivitamin (THERAGRAN) tablet 1 tablet, 1 tablet, Oral, Daily, 1 tablet at 08/10/22 0921 **AND** folic acid (FOLVITE) tablet 1 mg, 1 mg, Oral, Daily, Seferino Van MD, 1 mg at 08/10/22 0921  •  lactulose (CHRONULAC) 10 GM/15ML solution 30 g, 30 g, Oral, TID, Seferino Van MD, 30 g at 08/09/22 2017  •  Magnesium Sulfate 2 gram Bolus, followed by 8 gram infusion (total Mg dose 10 grams)- Mg less than or equal to 1mg/dL, 2 g, Intravenous, PRN **OR** Magnesium Sulfate 2 gram / 50mL Infusion (GIVE X 3 BAGS TO EQUAL 6GM TOTAL DOSE) - Mg 1.1 - 1.5 mg/dl, 2 g, Intravenous, PRN, Last Rate: 25 mL/hr at 08/08/22 0438, 2 g at 08/08/22 0438 **OR** Magnesium Sulfate 4 gram infusion- Mg 1.6-1.9 mg/dL, 4 g, Intravenous, PRN, Sanjay Diane, APRN  •  nitroglycerin (NITROSTAT) SL  tablet 0.4 mg, 0.4 mg, Sublingual, Q5 Min PRN, Foreign Wilkins MD  •  ondansetron (ZOFRAN) tablet 4 mg, 4 mg, Oral, Q6H PRN, 4 mg at 08/02/22 0316 **OR** ondansetron (ZOFRAN) injection 4 mg, 4 mg, Intravenous, Q6H PRN, Foreign Wilkins MD, 4 mg at 08/01/22 0345  •  oxyCODONE (ROXICODONE) immediate release tablet 5 mg, 5 mg, Oral, Q6H PRN, 5 mg at 08/10/22 0921 **OR** [DISCONTINUED] oxyCODONE (ROXICODONE) immediate release tablet 10 mg, 10 mg, Oral, Q6H PRN, Seferino Van MD  •  potassium & sodium phosphates (PHOS-NAK) 280-160-250 MG packet - for Phosphorus less than 1.25 mg/dL, 2 packet, Oral, Q6H PRN **OR** potassium & sodium phosphates (PHOS-NAK) 280-160-250 MG packet - for Phosphorus 1.25 - 2.5 mg/dL, 2 packet, Oral, Q6H PRN, Seferino Van MD, 2 packet at 08/06/22 1359  •  potassium chloride (K-DUR,KLOR-CON) ER tablet 40 mEq, 40 mEq, Oral, PRN, 40 mEq at 08/10/22 0939 **OR** potassium chloride (KLOR-CON) packet 40 mEq, 40 mEq, Oral, PRN **OR** potassium chloride 10 mEq in 100 mL IVPB, 10 mEq, Intravenous, Q1H PRN, Seferino Van MD  •  riFAXIMin (XIFAXAN) tablet 550 mg, 550 mg, Oral, Q12H, Seferino Van MD, 550 mg at 08/10/22 0921  •  [COMPLETED] Insert peripheral IV, , , Once **AND** sodium chloride 0.9 % flush 10 mL, 10 mL, Intravenous, PRN, Tomasz Robles MD  •  sodium chloride 0.9 % flush 10 mL, 10 mL, Intravenous, Q12H, Foreign Wilkins MD, 10 mL at 08/10/22 0921  •  sodium chloride 0.9 % flush 10 mL, 10 mL, Intravenous, PRN, Foreign Wilkins MD  •  thiamine (VITAMIN B-1) tablet 100 mg, 100 mg, Oral, Daily, Seferino Van MD, 100 mg at 08/10/22 0921  •  zinc sulfate (ZINCATE) capsule 220 mg, 220 mg, Oral, Daily, Foreign Wilkins MD, 220 mg at 08/10/22 0921  Review of Systems:    There is weakness and fatigue all other systems reviewed and negative    Objective     Vital Signs  Temp:  [98.3 °F (36.8 °C)-100 °F (37.8 °C)] 99.5 °F (37.5 °C)  Heart Rate:  [74-84]  81  Resp:  [16-20] 20  BP: (122-143)/(72-85) 129/72  Body mass index is 30.11 kg/m².    Intake/Output Summary (Last 24 hours) at 8/10/2022 1008  Last data filed at 8/10/2022 0916  Gross per 24 hour   Intake 1102 ml   Output 325 ml   Net 777 ml     No intake/output data recorded.     Physical Exam:   General: patient awake, alert and cooperative   Eyes: Normal lids and lashes, no scleral icterus   Neck: supple, normal ROM   Skin: warm and dry, not jaundiced   Cardiovascular: regular rhythm and rate, no murmurs auscultated   Pulm: clear to auscultation bilaterally, regular and unlabored   Abdomen: soft, nontender, nondistended; normal bowel sounds   Extremities: no rash or edema   Psychiatric: Normal mood and behavior; memory intact     Results Review:     I reviewed the patient's new clinical results.    Results from last 7 days   Lab Units 08/10/22  0552 08/09/22  0643 08/08/22  0646   WBC 10*3/mm3 4.94 4.14 6.02   HEMOGLOBIN g/dL 9.5* 9.2* 9.2*   HEMATOCRIT % 27.8* 27.4* 26.4*   PLATELETS 10*3/mm3 124* 127* 129*     Results from last 7 days   Lab Units 08/10/22  0552 08/09/22  1415 08/09/22  0643 08/08/22  0646   SODIUM mmol/L 135*  --  135* 134*   POTASSIUM mmol/L 3.1* 3.6 3.5 3.0*   CHLORIDE mmol/L 107  --  105 105   CO2 mmol/L 18.3*  --  20.0* 21.0*   BUN mg/dL 7  --  12 15   CREATININE mg/dL 0.50*  --  0.54* 0.57*   CALCIUM mg/dL 8.5*  --  8.6 8.7   BILIRUBIN mg/dL 0.8  --  0.8 1.1   ALK PHOS U/L 139*  --  124* 124*   ALT (SGPT) U/L 60*  --  60* 60*   AST (SGOT) U/L 141*  --  177* 225*   GLUCOSE mg/dL 96  --  100* 97         Lab Results   Lab Value Date/Time    LIPASE 375 (H) 08/05/2022 1058    LIPASE 609 (H) 08/04/2022 0857       Radiology:  US Gallbladder   Final Result   Multiple gallstones within the gallbladder. No appreciable wall   thickening or pericholecystic fluid       This report was finalized on 8/3/2022 7:17 PM by Dr. Aaron Novak M.D.          CT Abdomen Pelvis Without Contrast   Final  Result   1. Cholelithiasis with gallbladder wall thickening somewhat limited by   subjacent motion. Although findings may be the sequela of hepatic   dysfunction, recommend ultrasound and/or HIDA scan if there is clinical   concern for cholecystitis.   2. Morphologic changes of cirrhosis with portal hypertension with   splenomegaly and portosystemic shunting as described above.   3. Nonobstructing nephrolithiasis, left greater than right.   4. Please see above for additional findings/recommendations.       This report was finalized on 8/3/2022 11:44 AM by Dr. Carlos Alberto De Anda M.D.          CT Head Without Contrast   Final Result   No acute process identified. Further evaluation could be   performed with an MRI examination of the brain as indicated.       Radiation dose reduction techniques were utilized, including automated   exposure control and exposure modulation based on body size.       This report was finalized on 8/1/2022 7:10 AM by Dr. Juan Boswell M.D.          CT Abdomen Pelvis Without Contrast   Final Result         Electronically signed by Donal Vigil MD on 07-31-22 at 1923      XR Chest 1 View   Final Result          Assessment & Plan     Patient Active Problem List   Diagnosis   • Iron deficiency anemia   • Hepatic cirrhosis (HCC)   • Screening for malignant neoplasm of colon   • Abdominal aortic aneurysm (AAA) (HCC)   • Hepatitis C virus infection   • Gout   • Hypercholesterolemia   • Hepatic cirrhosis (HCC)   • Thoracic aortic aneurysm without rupture (HCC)   • Hypertensive disorder   • Hip pain   • Chronic pain   • Bicuspid aortic valve   • Arthritis of right hip   • Aortic valve regurgitation   • Anxiety   • Abdominal aortic aneurysm (AAA) (HCC)   • Hepatic cirrhosis (HCC)   • Hepatitis C virus infection   • Chronic pain   • Hepatic encephalopathy (HCC)   • Alcoholic cirrhosis of liver without ascites (HCC)   • YANNI (acute kidney injury) (HCC)   • Hypomagnesemia   • Tophaceous gout        Assessment:   1. Hepatic encephalopathy  2. Cirrhosis with history of alcohol abuse as well as hepatitis C  3. Anemia, no active bleeding  4. Cholelithiasis  5. Esophageal varices banded, PHG           Plan:   Continue lactulose/Xifaxin for PSE  2gm low NA diet  General surgery note reviewed, not a good candidate for CCY  Beta blocker initiated for esophageal varices, esophageal varices banded this past weekend  Pepcid for gut protection  GI okay with discharge to rehab or home if there is enough support at home    I discussed the patients findings and my recommendations with patient and nursing staff.    Wyatt Moulton MD

## 2022-08-10 NOTE — CASE MANAGEMENT/SOCIAL WORK
Continued Stay Note  [facility]     Patient Name: Macario Carpio  MRN: [id number]  Today's Date: 8/10/2022    Admit Date: 7/31/2022     Discharge Plan     Row Name 08/10/22 1027       Plan    Plan SNF- Multiple referrals pending.    Plan Comments [facility] unable to accept. Patient agreeable to referrals from [facility] to [place]. Referrals sent to [facility] and called to [facility]. Referrals pending. CCP to follow. Mike EPPERSON               Discharge Codes    No documentation.               Expected Discharge Date and Time     Expected Discharge Date Expected Discharge Time    Aug 11, 2022             Mike Johnson RN

## 2022-08-10 NOTE — PROGRESS NOTES
"Nutrition Services    Patient Name:  Macario Carpio  YOB: 1970  MRN: [id number]  Admit Date:  7/31/2022      PROGRESS NOTE    Comments: LOS 10  Pt tolerating Regular diet w/ % po intake of meals.   Last BM 8/10  Will follow per protocol.    Encounter Information         Reason for Encounter LOS 10    Admitting Diagnosis Hepatic Encephalopathy, gout, YANNI, hypomagnesemia, ETOH cirrhosis, HTN     Current Nutrition Orders & Evaluation of Intake       Oral Nutrition     Current PO Diet Diet Regular   Supplement n/a   PO Evaluation     Trending % PO Intake %   --  Anthropometrics          Height    Weight Height: 180.3 cm (71\")  Weight: 97.9 kg (215 lb 14.4 oz) (08/10/22 0439)    BMI kg/m2 Body mass index is 30.11 kg/m².    Weight trend      Labs        Pertinent Labs Reviewed, listed below     Results from last 7 days   Lab Units 08/10/22  0552 08/09/22  1415 08/09/22  0643 08/08/22  0646   SODIUM mmol/L 135*  --  135* 134*   POTASSIUM mmol/L 3.1* 3.6 3.5 3.0*   CHLORIDE mmol/L 107  --  105 105   CO2 mmol/L 18.3*  --  20.0* 21.0*   BUN mg/dL 7  --  12 15   CREATININE mg/dL 0.50*  --  0.54* 0.57*   CALCIUM mg/dL 8.5*  --  8.6 8.7   BILIRUBIN mg/dL 0.8  --  0.8 1.1   ALK PHOS U/L 139*  --  124* 124*   ALT (SGPT) U/L 60*  --  60* 60*   AST (SGOT) U/L 141*  --  177* 225*   GLUCOSE mg/dL 96  --  100* 97     Results from last 7 days   Lab Units 08/10/22  0552 08/09/22  0643 08/08/22  1949 08/08/22  0646 08/07/22  1111 08/06/22  0305 08/06/22  0304   MAGNESIUM mg/dL  --   --   --  2.4 1.4*  --  1.7   PHOSPHORUS mg/dL  --   --  2.9  --  2.2*   < > 2.1*   HEMOGLOBIN g/dL 9.5*   < >  --  9.2* 9.4*   < >  --    HEMATOCRIT % 27.8*   < >  --  26.4* 26.8*   < >  --    WBC 10*3/mm3 4.94   < >  --  6.02 8.25   < >  --     < > = values in this interval not displayed.     Results from last 7 days   Lab Units 08/10/22  0552 08/09/22  0643 08/08/22  0646 08/07/22  1111 08/06/22  0305   PLATELETS 10*3/mm3 124* " 127* 129* 119* 89*     COVID19   Date Value Ref Range Status   08/06/2022 Not Detected Not Detected - Ref. Range Final     Lab Results   Component Value Date    HGBA1C 4.90 12/20/2019          Medications            Scheduled Medications allopurinol, 100 mg, Oral, Daily  cadexomer iodine, 1 application, Topical, Once per day on Mon Thu  carvedilol, 12.5 mg, Oral, BID With Meals  citalopram, 20 mg, Oral, Daily  famotidine, 20 mg, Oral, BID AC  multivitamin, 1 tablet, Oral, Daily   And  folic acid, 1 mg, Oral, Daily  lactulose, 30 g, Oral, TID  rifAXIMin, 550 mg, Oral, Q12H  sodium chloride, 10 mL, Intravenous, Q12H  thiamine, 100 mg, Oral, Daily  zinc sulfate, 220 mg, Oral, Daily        Infusions      PRN Medications •  acetaminophen **OR** acetaminophen **OR** acetaminophen  •  magnesium sulfate **OR** magnesium sulfate **OR** magnesium sulfate  •  nitroglycerin  •  ondansetron **OR** ondansetron  •  oxyCODONE **OR** [DISCONTINUED] oxyCODONE  •  potassium & sodium phosphates **OR** potassium & sodium phosphates  •  potassium chloride **OR** potassium chloride **OR** potassium chloride  •  [COMPLETED] Insert peripheral IV **AND** sodium chloride  •  sodium chloride     Physical Findings         Physical Appearance Alert, disoriented, confused, room air   --   Gastrointestinal Normoactive, last BM 8/10    Skin R medial foot wound     Intervention Goal        Intervention Goal(s) Maintain nutrition status, Meet estimated needs, Disease management/therapy, Maintain intake and Appropriate weight loss     Nutrition Intervention        RD Action Encourage intake, Follow Tx Progress and Care plan reviewed     Nutrition Prescription         Diet Prescription Regular diet   Supplement Prescription N/a   Prescription Ordered n/a     Monitor/Evaluation        Monitor Per protocol     RD to follow up per protocol.  Electronically signed by:  Marilee Chauhan RD  08/10/22 15:47 EDT

## 2022-08-11 LAB
ALBUMIN SERPL-MCNC: 2.7 G/DL (ref 3.5–5.2)
ALBUMIN/GLOB SERPL: 0.8 G/DL
ALP SERPL-CCNC: 154 U/L (ref 39–117)
ALT SERPL W P-5'-P-CCNC: 56 U/L (ref 1–41)
ANION GAP SERPL CALCULATED.3IONS-SCNC: 12 MMOL/L (ref 5–15)
AST SERPL-CCNC: 115 U/L (ref 1–40)
BASOPHILS # BLD AUTO: 0.06 10*3/MM3 (ref 0–0.2)
BASOPHILS NFR BLD AUTO: 0.6 % (ref 0–1.5)
BILIRUB SERPL-MCNC: 1.1 MG/DL (ref 0–1.2)
BUN SERPL-MCNC: 7 MG/DL (ref 6–20)
BUN/CREAT SERPL: 13 (ref 7–25)
CALCIUM SPEC-SCNC: 8.8 MG/DL (ref 8.6–10.5)
CHLORIDE SERPL-SCNC: 104 MMOL/L (ref 98–107)
CO2 SERPL-SCNC: 17 MMOL/L (ref 22–29)
CREAT SERPL-MCNC: 0.54 MG/DL (ref 0.76–1.27)
DEPRECATED RDW RBC AUTO: 44.7 FL (ref 37–54)
EGFRCR SERPLBLD CKD-EPI 2021: 120.7 ML/MIN/1.73
EOSINOPHIL # BLD AUTO: 0.1 10*3/MM3 (ref 0–0.4)
EOSINOPHIL NFR BLD AUTO: 1 % (ref 0.3–6.2)
ERYTHROCYTE [DISTWIDTH] IN BLOOD BY AUTOMATED COUNT: 13.7 % (ref 12.3–15.4)
GLOBULIN UR ELPH-MCNC: 3.2 GM/DL
GLUCOSE SERPL-MCNC: 140 MG/DL (ref 65–99)
HCT VFR BLD AUTO: 27.1 % (ref 37.5–51)
HGB BLD-MCNC: 9.4 G/DL (ref 13–17.7)
LYMPHOCYTES # BLD AUTO: 0.72 10*3/MM3 (ref 0.7–3.1)
LYMPHOCYTES NFR BLD AUTO: 7 % (ref 19.6–45.3)
MCH RBC QN AUTO: 31.3 PG (ref 26.6–33)
MCHC RBC AUTO-ENTMCNC: 34.7 G/DL (ref 31.5–35.7)
MCV RBC AUTO: 90.3 FL (ref 79–97)
MONOCYTES # BLD AUTO: 1.19 10*3/MM3 (ref 0.1–0.9)
MONOCYTES NFR BLD AUTO: 11.6 % (ref 5–12)
NEUTROPHILS NFR BLD AUTO: 79.3 % (ref 42.7–76)
NEUTROPHILS NFR BLD AUTO: 8.18 10*3/MM3 (ref 1.7–7)
PLATELET # BLD AUTO: 151 10*3/MM3 (ref 140–450)
PMV BLD AUTO: 10.8 FL (ref 6–12)
POTASSIUM SERPL-SCNC: 3.8 MMOL/L (ref 3.5–5.2)
PROT SERPL-MCNC: 5.9 G/DL (ref 6–8.5)
RBC # BLD AUTO: 3 10*6/MM3 (ref 4.14–5.8)
SODIUM SERPL-SCNC: 133 MMOL/L (ref 136–145)
WBC NRBC COR # BLD: 10.3 10*3/MM3 (ref 3.4–10.8)

## 2022-08-11 PROCEDURE — 97530 THERAPEUTIC ACTIVITIES: CPT

## 2022-08-11 PROCEDURE — 80053 COMPREHEN METABOLIC PANEL: CPT | Performed by: INTERNAL MEDICINE

## 2022-08-11 PROCEDURE — 85025 COMPLETE CBC W/AUTO DIFF WBC: CPT | Performed by: INTERNAL MEDICINE

## 2022-08-11 RX ORDER — CITALOPRAM 20 MG/1
TABLET ORAL
Qty: 90 TABLET | Refills: 0 | Status: SHIPPED | OUTPATIENT
Start: 2022-08-11 | End: 2022-09-16 | Stop reason: SDUPTHER

## 2022-08-11 RX ADMIN — Medication 1 APPLICATION: at 08:22

## 2022-08-11 RX ADMIN — OXYCODONE HYDROCHLORIDE 5 MG: 5 TABLET ORAL at 03:44

## 2022-08-11 RX ADMIN — Medication 220 MG: at 08:22

## 2022-08-11 RX ADMIN — Medication 10 ML: at 08:22

## 2022-08-11 RX ADMIN — FAMOTIDINE 20 MG: 20 TABLET ORAL at 08:22

## 2022-08-11 RX ADMIN — FAMOTIDINE 20 MG: 20 TABLET ORAL at 17:35

## 2022-08-11 RX ADMIN — CARVEDILOL 12.5 MG: 12.5 TABLET, FILM COATED ORAL at 17:35

## 2022-08-11 RX ADMIN — ALLOPURINOL 100 MG: 100 TABLET ORAL at 08:21

## 2022-08-11 RX ADMIN — ACETAMINOPHEN 650 MG: 325 TABLET, FILM COATED ORAL at 01:02

## 2022-08-11 RX ADMIN — Medication 10 ML: at 20:25

## 2022-08-11 RX ADMIN — LACTULOSE 30 G: 10 SOLUTION ORAL at 08:21

## 2022-08-11 RX ADMIN — RIFAXIMIN 550 MG: 550 TABLET ORAL at 08:21

## 2022-08-11 RX ADMIN — CARVEDILOL 12.5 MG: 12.5 TABLET, FILM COATED ORAL at 08:21

## 2022-08-11 RX ADMIN — Medication 1 MG: at 08:21

## 2022-08-11 RX ADMIN — Medication 1 TABLET: at 08:21

## 2022-08-11 RX ADMIN — RIFAXIMIN 550 MG: 550 TABLET ORAL at 20:25

## 2022-08-11 RX ADMIN — OXYCODONE HYDROCHLORIDE 5 MG: 5 TABLET ORAL at 18:12

## 2022-08-11 RX ADMIN — Medication 100 MG: at 08:21

## 2022-08-11 RX ADMIN — OXYCODONE HYDROCHLORIDE 5 MG: 5 TABLET ORAL at 12:34

## 2022-08-11 RX ADMIN — CITALOPRAM 20 MG: 20 TABLET, FILM COATED ORAL at 08:21

## 2022-08-11 NOTE — PROGRESS NOTES
"DAILY PROGRESS NOTE  [facility]    Patient Identification:  Name: Macario Carpio  Age: 51 y.o.  Sex: male  :  1970  MRN: [id number]         Primary Care Physician: Andrew Grayson APRN      Subjective  Patient with no acute complaints.  States \"I am so ready to go\".  Complains of his chronic pain issues which he blames on tophaceous gout (this is not generally a chronic pain issue).    Objective:  General Appearance:  Comfortable, well-appearing, in no acute distress and not in pain.    Vital signs: (most recent): Blood pressure 129/70, pulse 74, temperature 97.4 °F (36.3 °C), temperature source Oral, resp. rate 20, height 180.3 cm (71\"), weight 100 kg (221 lb), SpO2 94 %.    Lungs:  Normal effort and normal respiratory rate.  Breath sounds clear to auscultation.    Heart: Normal rate.  Regular rhythm.    Abdomen: Abdomen is soft and distended.    Extremities: There is no dependent edema.    Neurological: Patient is alert and oriented to person, place and time.    Skin:  Warm and dry.                Vital signs in last 24 hours:  Temp:  [97.2 °F (36.2 °C)-99.3 °F (37.4 °C)] 97.4 °F (36.3 °C)  Heart Rate:  [74-78] 74  Resp:  [18-20] 20  BP: (129-138)/(66-77) 129/70    Intake/Output:    Intake/Output Summary (Last 24 hours) at 2022 1217  Last data filed at 2022 0913  Gross per 24 hour   Intake 0 ml   Output --   Net 0 ml         Results from last 7 days   Lab Units 22  0535 08/10/22  0552 22  0643 22  0646 22  1111 22  0305 22  1058   WBC 10*3/mm3 10.30 4.94 4.14 6.02 8.25 10.18 8.21   HEMOGLOBIN g/dL 9.4* 9.5* 9.2* 9.2* 9.4* 9.7* 10.1*   PLATELETS 10*3/mm3 151 124* 127* 129* 119* 89* 87*     Results from last 7 days   Lab Units 22  0535 08/10/22  0552 22  1415 22  0643 22  0646 22  1111 22  0304 22  1058   SODIUM mmol/L 133* 135*  --  135* 134* 135* 130* 128*   POTASSIUM mmol/L 3.8 3.1* 3.6 3.5 3.0* " 3.5 3.9 3.9   CHLORIDE mmol/L 104 107  --  105 105 102 101 101   CO2 mmol/L 17.0* 18.3*  --  20.0* 21.0* 20.6* 18.0* 17.0*   BUN mg/dL 7 7  --  12 15 15 9 8   CREATININE mg/dL 0.54* 0.50*  --  0.54* 0.57* 0.60* 0.61* 0.69*   GLUCOSE mg/dL 140* 96  --  100* 97 121* 102* 108*   Estimated Creatinine Clearance: 195 mL/min (A) (by C-G formula based on SCr of 0.54 mg/dL (L)).  Results from last 7 days   Lab Units 08/11/22  0535 08/10/22  0552 08/09/22  0643 08/08/22  1949 08/08/22  0646 08/07/22  1111 08/06/22  1833 08/06/22  1039 08/06/22  0304 08/05/22  1958 08/05/22  1541 08/05/22  1058   CALCIUM mg/dL 8.8 8.5* 8.6  --  8.7 8.4*  --   --  8.0*  --   --  8.0*   ALBUMIN g/dL 2.70* 2.60* 2.60*  --  2.70* 2.70*  --   --  3.00*  --   --  2.70*   MAGNESIUM mg/dL  --   --   --   --  2.4 1.4*  --   --  1.7  --   --  1.1*   PHOSPHORUS mg/dL  --   --   --  2.9  --  2.2* 2.8 2.1* 2.1* 2.0* 2.2* 1.9*     Results from last 7 days   Lab Units 08/11/22  0535 08/10/22  0552 08/09/22  0643 08/08/22  0646 08/07/22  1111 08/06/22  0304 08/05/22  1058   ALBUMIN g/dL 2.70* 2.60* 2.60* 2.70* 2.70* 3.00* 2.70*   BILIRUBIN mg/dL 1.1 0.8 0.8 1.1 1.3* 1.5* 1.4*   ALK PHOS U/L 154* 139* 124* 124* 111 119* 109   AST (SGOT) U/L 115* 141* 177* 225* 56* 67* 52*   ALT (SGPT) U/L 56* 60* 60* 60* 22 22 16       Assessment:  Hepatic encephalopathy: Resolved.  Continue rifaximin and lactulose.  Alcoholic cirrhosis/HCV: Associated with multiple complications thereof.  Grade 2 esophageal varices: Status post banding 8/7/2022.  Hypomagnesemia: Replaced.  Hypophosphatemia: Replace.  Hyponatremia: Mild.  If progresses then fluid restriction.  Alcohol abuse:  Cannabinoid abuse:  Chronic pancreatitis:  Chronic pain-chronic narcotic dependence: Avoid escalation.  YANNI: Resolved.  Deconditioning:    All problems new to this examiner.    Plan:  Please see above.  Discharge planning.  Discussed with CCP.    Bhanu Hernandez MD  8/11/2022  12:17 EDT

## 2022-08-11 NOTE — NURSING NOTE
08/11/22 1325   Wound 08/01/22 0700 Right medial foot   Placement Date/Time: 08/01/22 0700   Present on Hospital Admission: Yes  Side: Right  Orientation: medial  Location: foot   Dressing Appearance   (kerlix, ace)   Base red;granulating;moist   Periwound intact;pink   Drainage Amount none   Care, Wound cleansed with;sterile normal saline   Dressing Care dressing changed  (iodosorb gel, 2x2 gauze, wrap with kerlix/ace foot/ankle)     Wound/ostomy - wound care completed today. dried brown colored residue to periwound remains despite soaking and cleansing but some did remove.   Wound bed granulating, unchanged from when last seen by myself. Wound nurse will continue to manage dressing changes while patient is here, dressing to be changed 2 times weekly. Patient is current with [facility]  for dressing changes. Looks like current plan is d/c to SNF.

## 2022-08-11 NOTE — PLAN OF CARE
Goal Outcome Evaluation:      Pain meds given PRN. One episode emesis. Multiple large bowel movements. Moderate urine output. RA. Patient subdued, quiet for most of shift. All needs met.

## 2022-08-11 NOTE — THERAPY TREATMENT NOTE
Patient Name: Macario Carpio  : 1970    MRN: [id number]                              Today's Date: 2022       Admit Date: 2022    Visit Dx:     ICD-10-CM ICD-9-CM   1. Hepatic encephalopathy (HCC)  K72.90 572.2   2. Uremic encephalopathy  G93.49 348.31    N19    3. Acute kidney injury (HCC)  N17.9 584.9   4. Hypomagnesemia  E83.42 275.2   5. Alcoholic cirrhosis, unspecified whether ascites present (HCC)  K70.30 571.2   6. Alcoholic cirrhosis of liver without ascites (HCC)  K70.30 571.2     Patient Active Problem List   Diagnosis   • Iron deficiency anemia   • Hepatic cirrhosis (HCC)   • Screening for malignant neoplasm of colon   • Abdominal aortic aneurysm (AAA) (HCC)   • Hepatitis C virus infection   • Gout   • Hypercholesterolemia   • Hepatic cirrhosis (HCC)   • Thoracic aortic aneurysm without rupture (HCC)   • Hypertensive disorder   • Hip pain   • Chronic pain   • Bicuspid aortic valve   • Arthritis of right hip   • Aortic valve regurgitation   • Anxiety   • Abdominal aortic aneurysm (AAA) (HCC)   • Hepatic cirrhosis (HCC)   • Hepatitis C virus infection   • Chronic pain   • Hepatic encephalopathy (HCC)   • Alcoholic cirrhosis of liver without ascites (HCC)   • YANNI (acute kidney injury) (HCC)   • Hypomagnesemia   • Tophaceous gout     Past Medical History:   Diagnosis Date   • Gout    • Hepatitis C      Past Surgical History:   Procedure Laterality Date   • ENDOSCOPY N/A 2022    Procedure: ESOPHAGOGASTRODUODENOSCOPY WITH COLD BIOPSIES, BAND PLACEMENT X 3;  Surgeon: Macario Dent MD;  Location: [facility] ENDOSCOPY;  Service: Gastroenterology;  Laterality: N/A;  PRE- CIRRHOSIS, EVAL OF VARICES  POST- GASTRIC ULCER, GASTRITIS. PORTAL HYPERTENSIVE GASTROPATHY, GASTRIC & ESOPHAGEAL VARICES   • TOE AMPUTATION     • TOTAL HIP ARTHROPLASTY Right 2020      General Information     Row Name 22 0136          Physical Therapy Time and Intention    Document Type therapy note (daily note)   -     Mode of Treatment individual therapy;physical therapy  -     Row Name 08/11/22 1636          General Information    Patient Profile Reviewed yes  -     Existing Precautions/Restrictions fall  -     Row Name 08/11/22 1636          Cognition    Orientation Status (Cognition) oriented to;person;place  -     Row Name 08/11/22 1636          Safety Issues, Functional Mobility    Impairments Affecting Function (Mobility) endurance/activity tolerance;strength;range of motion (ROM);cognition;coordination;motor control;motor planning  -           User Key  (r) = Recorded By, (t) = Taken By, (c) = Cosigned By    Initials Name Provider Type     Nayely Glover PT Physical Therapist               Mobility     Row Name 08/11/22 1636          Bed Mobility    Supine-Sit [place] (Bed Mobility) moderate assist (50% patient effort);2 person assist;verbal cues  -     Sit-Supine [place] (Bed Mobility) moderate assist (50% patient effort);2 person assist;verbal cues  -     Assistive Device (Bed Mobility) draw sheet;head of bed elevated  -     Comment, (Bed Mobility) Very slow moving BLE to EOB, assisted with sheet to come to full sit  -     Row Name 08/11/22 1636          Sit-Stand Transfer    Sit-Stand [place] (Transfers) maximum assist (25% patient effort);2 person assist;verbal cues;nonverbal cues (demo/gesture)  -     Assistive Device (Sit-Stand Transfers) other (see comments)  lifting from underarm - not tolerating HHA  -     Row Name 08/11/22 1636          Gait/Stairs (Locomotion)    [place] Level (Gait) unable to assess  -           User Key  (r) = Recorded By, (t) = Taken By, (c) = Cosigned By    Initials Name Provider Type     Nayely Glover PT Physical Therapist               Obj/Interventions     Row Name 08/11/22 1637          Motor Skills    Therapeutic Exercise --  5 reps B AP/LAQ/seated marches - poor ROM/strength but does not allow assist  -           User Key  " (r) = Recorded By, (t) = Taken By, (c) = Cosigned By    Initials Name Provider Type     Nayely Glover, PT Physical Therapist               Goals/Plan    No documentation.                Clinical Impression     Row Name 08/11/22 1637          Pain    Pre/Posttreatment Pain Comment C/o generalized pain, especially BLE and hands  -     Pain Intervention(s) Repositioned;Ambulation/increased activity  -     Row Name 08/11/22 1637          Plan of Care Review    Plan of Care Reviewed With patient  -     Progress no change  -     Outcome Evaluation Pt agreeable to PT with some encouragement. Pt transferred to EOB with mod A x2 - initiated moving BLE to EOB very slowly, but required assist of sheet to come to full sit. Pt tolerated LE exercises - max cues with noted impaired ROM/strength and does not allow PT to help d/t pain with even light touch to BLEs. Pt agreeable to standing and required max A x2 for both attempts - becoming frustrated on second attempt and gave very little assist, telling therapist \"just pull me up.\" Attempts to educate pt throughout session about importance of attempting to assist with mobility to improve strength/endurance but pt not very responsive to education. Pt assisted back to supine and repositioned, left with all needs met and RN updated/present in room. PT will continue to follow and progress mobility as tolerated.  -     Row Name 08/11/22 1637          Vital Signs    O2 Delivery Pre Treatment room air  -     O2 Delivery Intra Treatment room air  -     O2 Delivery Post Treatment room air  -     Row Name 08/11/22 1637          Positioning and Restraints    Pre-Treatment Position in bed  -     Post Treatment Position bed  -     In Bed supine;call light within reach;encouraged to call for assist;exit alarm on;notified nsg;with nsg  -           User Key  (r) = Recorded By, (t) = Taken By, (c) = Cosigned By    Initials Name Provider Type     Nayely Glover, PT " Physical Therapist               Outcome Measures     Row Name 08/11/22 1641          How much help from another person do you currently need...    Turning from your back to your side while in flat bed without using bedrails? 2  -BH     Moving from lying on back to sitting on the side of a flat bed without bedrails? 2  -BH     Moving to and from a bed to a chair (including a wheelchair)? 1  -BH     Standing up from a chair using your arms (e.g., wheelchair, bedside chair)? 2  -BH     Climbing 3-5 steps with a railing? 1  -BH     To walk in hospital room? 1  -BH     AM-PAC 6 Clicks Score (PT) 9  -     Highest level of mobility 3 --> Sat at edge of bed  -     Row Name 08/11/22 1641          Functional Assessment    Outcome Measure Options AM-PAC 6 Clicks Basic Mobility (PT)  -           User Key  (r) = Recorded By, (t) = Taken By, (c) = Cosigned By    Initials Name Provider Type     Nayely Glover, PT Physical Therapist                             Physical Therapy Education                 Title: PT OT SLP Therapies (In Progress)     Topic: Physical Therapy (In Progress)     Point: Mobility training (In Progress)     Learning Progress Summary           Patient Acceptance, E,TB,D, NR by  at 8/11/2022 1641    Acceptance, D,E, NR,NL by  at 8/9/2022 1619    Acceptance, E,TB,D, VU,NR by  at 8/8/2022 1631    Acceptance, E,TB, VU by TALA at 8/6/2022 0431    Acceptance, E, VU by MIKAYLA at 8/5/2022 1515    Comment: Pt educated on safe positioning when seated EOB.    Acceptance, E, VU by  at 8/5/2022 1513                   Point: Home exercise program (In Progress)     Learning Progress Summary           Patient Acceptance, E,TB,D, NR by  at 8/11/2022 1641    Acceptance, D,E, NR,NL by EB at 8/9/2022 1619    Acceptance, E,TB,D, VU,NR by  at 8/8/2022 1631    Acceptance, E,TB, VU by TALA at 8/6/2022 0431    Acceptance, E, VU by MIKAYLA at 8/5/2022 1515    Comment: Pt educated on safe positioning when seated EOB.     "Acceptance, E, VU by  at 8/5/2022 1513                   Point: Body mechanics (In Progress)     Learning Progress Summary           Patient Acceptance, E,TB,D, NR by  at 8/11/2022 1641    Acceptance, D,E, NR,NL by  at 8/9/2022 1619    Acceptance, E,TB,D, VU,NR by  at 8/8/2022 1631    Acceptance, E,TB, VU by  at 8/6/2022 0431    Acceptance, E, VU by  at 8/5/2022 1515    Comment: Pt educated on safe positioning when seated EOB.    Acceptance, E, VU by  at 8/5/2022 1513                   Point: Precautions (In Progress)     Learning Progress Summary           Patient Acceptance, E,TB,D, NR by  at 8/11/2022 1641    Acceptance, D,E, NR,NL by  at 8/9/2022 1619    Acceptance, E,TB,D, VU,NR by  at 8/8/2022 1631    Acceptance, E,TB, VU by  at 8/6/2022 0431    Acceptance, E, VU by  at 8/5/2022 1515    Comment: Pt educated on safe positioning when seated EOB.    Acceptance, E, VU by  at 8/5/2022 1513                               User Key     Initials Effective Dates Name Provider Type Discipline     06/16/21 -  Mary Samaniego, PTA Physical Therapist Assistant PT     06/16/21 -  Nola Atkinson, RN Registered Nurse Nurse     04/08/22 -  Nayely Glover, PT Physical Therapist PT    MIKAYLA 08/02/22 -  Beto Smith OT Occupational Therapist OT     05/02/22 -  Valarie Haley PT Physical Therapist PT              PT Recommendation and Plan     Plan of Care Reviewed With: patient  Progress: no change  Outcome Evaluation: Pt agreeable to PT with some encouragement. Pt transferred to EOB with mod A x2 - initiated moving BLE to EOB very slowly, but required assist of sheet to come to full sit. Pt tolerated LE exercises - max cues with noted impaired ROM/strength and does not allow PT to help d/t pain with even light touch to BLEs. Pt agreeable to standing and required max A x2 for both attempts - becoming frustrated on second attempt and gave very little assist, telling therapist \"just pull me up.\" " Attempts to educate pt throughout session about importance of attempting to assist with mobility to improve strength/endurance but pt not very responsive to education. Pt assisted back to supine and repositioned, left with all needs met and RN updated/present in room. PT will continue to follow and progress mobility as tolerated.     Time Calculation:    PT Charges     Row Name 08/11/22 1641             Time Calculation    Start Time 1611  -      Stop Time 1631  -      Time Calculation (min) 20 min  -      PT Received On 08/11/22  -      PT - Next Appointment 08/12/22  -              Time Calculation- PT    Total Timed Code Minutes- PT 20 minute(s)  -BH              Timed Charges    07970 - PT Therapeutic Activity Minutes 20  -BH              Total Minutes    Timed Charges Total Minutes 20  -BH       Total Minutes 20  -BH            User Key  (r) = Recorded By, (t) = Taken By, (c) = Cosigned By    Initials Name Provider Type     Nayely Glover PT Physical Therapist              Therapy Charges for Today     Code Description Service Date Service Provider Modifiers Qty    63716845610  PT THERAPEUTIC ACT EA 15 MIN 8/11/2022 Nayely Glover, PT GP 1    [id number] HC PT THER SUPP EA 15 MIN 8/11/2022 Nayely Glover, PT GP 1          PT G-Codes  Outcome Measure Options: AM-PAC 6 Clicks Basic Mobility (PT)  AM-PAC 6 Clicks Score (PT): 9  AM-PAC 6 Clicks Score (OT): 13  Modified Una Scale: 4 - Moderately severe disability.  Unable to walk without assistance, and unable to attend to own bodily needs without assistance.    Nayely Glover PT  8/11/2022

## 2022-08-11 NOTE — PLAN OF CARE
"Goal Outcome Evaluation:  Plan of Care Reviewed With: patient        Progress: no change  Outcome Evaluation: Pt agreeable to PT with some encouragement. Pt transferred to EOB with mod A x2 - initiated moving BLE to EOB very slowly, but required assist of sheet to come to full sit. Pt tolerated LE exercises - max cues with noted impaired ROM/strength and does not allow PT to help d/t pain with even light touch to BLEs. Pt agreeable to standing and required max A x2 for both attempts - becoming frustrated on second attempt and gave very little assist, telling therapist \"just pull me up.\" Attempts to educate pt throughout session about importance of attempting to assist with mobility to improve strength/endurance but pt not very responsive to education. Pt assisted back to supine and repositioned, left with all needs met and RN updated/present in room. PT will continue to follow and progress mobility as tolerated.  "

## 2022-08-12 LAB
ALBUMIN SERPL-MCNC: 2.6 G/DL (ref 3.5–5.2)
ALBUMIN/GLOB SERPL: 0.8 G/DL
ALP SERPL-CCNC: 157 U/L (ref 39–117)
ALT SERPL W P-5'-P-CCNC: 49 U/L (ref 1–41)
ANION GAP SERPL CALCULATED.3IONS-SCNC: 10.8 MMOL/L (ref 5–15)
AST SERPL-CCNC: 99 U/L (ref 1–40)
BASOPHILS # BLD AUTO: 0.05 10*3/MM3 (ref 0–0.2)
BASOPHILS NFR BLD AUTO: 0.7 % (ref 0–1.5)
BILIRUB SERPL-MCNC: 1 MG/DL (ref 0–1.2)
BUN SERPL-MCNC: 8 MG/DL (ref 6–20)
BUN/CREAT SERPL: 14.3 (ref 7–25)
CALCIUM SPEC-SCNC: 8.6 MG/DL (ref 8.6–10.5)
CHLORIDE SERPL-SCNC: 103 MMOL/L (ref 98–107)
CO2 SERPL-SCNC: 18.2 MMOL/L (ref 22–29)
CREAT SERPL-MCNC: 0.56 MG/DL (ref 0.76–1.27)
DEPRECATED RDW RBC AUTO: 47.1 FL (ref 37–54)
EGFRCR SERPLBLD CKD-EPI 2021: 119.3 ML/MIN/1.73
EOSINOPHIL # BLD AUTO: 0.11 10*3/MM3 (ref 0–0.4)
EOSINOPHIL NFR BLD AUTO: 1.5 % (ref 0.3–6.2)
ERYTHROCYTE [DISTWIDTH] IN BLOOD BY AUTOMATED COUNT: 13.7 % (ref 12.3–15.4)
GLOBULIN UR ELPH-MCNC: 3.2 GM/DL
GLUCOSE SERPL-MCNC: 136 MG/DL (ref 65–99)
HCT VFR BLD AUTO: 26.5 % (ref 37.5–51)
HGB BLD-MCNC: 8.8 G/DL (ref 13–17.7)
IMM GRANULOCYTES # BLD AUTO: 0.04 10*3/MM3 (ref 0–0.05)
IMM GRANULOCYTES NFR BLD AUTO: 0.6 % (ref 0–0.5)
LYMPHOCYTES # BLD AUTO: 0.65 10*3/MM3 (ref 0.7–3.1)
LYMPHOCYTES NFR BLD AUTO: 9.1 % (ref 19.6–45.3)
MCH RBC QN AUTO: 30.8 PG (ref 26.6–33)
MCHC RBC AUTO-ENTMCNC: 33.2 G/DL (ref 31.5–35.7)
MCV RBC AUTO: 92.7 FL (ref 79–97)
MONOCYTES # BLD AUTO: 0.52 10*3/MM3 (ref 0.1–0.9)
MONOCYTES NFR BLD AUTO: 7.3 % (ref 5–12)
NEUTROPHILS NFR BLD AUTO: 5.77 10*3/MM3 (ref 1.7–7)
NEUTROPHILS NFR BLD AUTO: 80.8 % (ref 42.7–76)
NRBC BLD AUTO-RTO: 0 /100 WBC (ref 0–0.2)
PLATELET # BLD AUTO: 118 10*3/MM3 (ref 140–450)
PMV BLD AUTO: 10.8 FL (ref 6–12)
POTASSIUM SERPL-SCNC: 3.5 MMOL/L (ref 3.5–5.2)
PROT SERPL-MCNC: 5.8 G/DL (ref 6–8.5)
RBC # BLD AUTO: 2.86 10*6/MM3 (ref 4.14–5.8)
SODIUM SERPL-SCNC: 132 MMOL/L (ref 136–145)
WBC NRBC COR # BLD: 7.14 10*3/MM3 (ref 3.4–10.8)

## 2022-08-12 PROCEDURE — 97530 THERAPEUTIC ACTIVITIES: CPT

## 2022-08-12 PROCEDURE — 85025 COMPLETE CBC W/AUTO DIFF WBC: CPT | Performed by: INTERNAL MEDICINE

## 2022-08-12 PROCEDURE — 80053 COMPREHEN METABOLIC PANEL: CPT | Performed by: INTERNAL MEDICINE

## 2022-08-12 PROCEDURE — 63710000001 METHYLPREDNISOLONE 4 MG TABLET THERAPY PACK 21 EACH DISP PACK: Performed by: HOSPITALIST

## 2022-08-12 RX ORDER — FOLIC ACID 1 MG/1
1 TABLET ORAL DAILY
Status: DISCONTINUED | OUTPATIENT
Start: 2022-08-12 | End: 2022-08-14 | Stop reason: HOSPADM

## 2022-08-12 RX ORDER — METHYLPREDNISOLONE 4 MG/1
24 TABLET ORAL ONCE
Status: COMPLETED | OUTPATIENT
Start: 2022-08-12 | End: 2022-08-12

## 2022-08-12 RX ORDER — COLCHICINE 0.6 MG/1
0.6 TABLET ORAL DAILY
Status: DISCONTINUED | OUTPATIENT
Start: 2022-08-12 | End: 2022-08-14 | Stop reason: HOSPADM

## 2022-08-12 RX ORDER — METHYLPREDNISOLONE 4 MG/1
8 TABLET ORAL
Status: COMPLETED | OUTPATIENT
Start: 2022-08-13 | End: 2022-08-13

## 2022-08-12 RX ORDER — METHYLPREDNISOLONE 4 MG/1
4 TABLET ORAL
Status: DISCONTINUED | OUTPATIENT
Start: 2022-08-16 | End: 2022-08-14 | Stop reason: HOSPADM

## 2022-08-12 RX ORDER — OXYCODONE HYDROCHLORIDE 5 MG/1
10 TABLET ORAL EVERY 6 HOURS PRN
Status: DISCONTINUED | OUTPATIENT
Start: 2022-08-12 | End: 2022-08-14 | Stop reason: HOSPADM

## 2022-08-12 RX ORDER — METHYLPREDNISOLONE 4 MG/1
4 TABLET ORAL
Status: DISCONTINUED | OUTPATIENT
Start: 2022-08-15 | End: 2022-08-14 | Stop reason: HOSPADM

## 2022-08-12 RX ORDER — METHYLPREDNISOLONE 4 MG/1
4 TABLET ORAL
Status: COMPLETED | OUTPATIENT
Start: 2022-08-13 | End: 2022-08-13

## 2022-08-12 RX ORDER — METHYLPREDNISOLONE 4 MG/1
4 TABLET ORAL
Status: DISCONTINUED | OUTPATIENT
Start: 2022-08-14 | End: 2022-08-14 | Stop reason: HOSPADM

## 2022-08-12 RX ORDER — METHYLPREDNISOLONE 4 MG/1
4 TABLET ORAL
Status: DISCONTINUED | OUTPATIENT
Start: 2022-08-17 | End: 2022-08-14 | Stop reason: HOSPADM

## 2022-08-12 RX ADMIN — FAMOTIDINE 20 MG: 20 TABLET ORAL at 06:39

## 2022-08-12 RX ADMIN — LACTULOSE 30 G: 10 SOLUTION ORAL at 16:13

## 2022-08-12 RX ADMIN — CITALOPRAM 20 MG: 20 TABLET, FILM COATED ORAL at 08:10

## 2022-08-12 RX ADMIN — OXYCODONE HYDROCHLORIDE 5 MG: 5 TABLET ORAL at 11:00

## 2022-08-12 RX ADMIN — RIFAXIMIN 550 MG: 550 TABLET ORAL at 21:24

## 2022-08-12 RX ADMIN — CARVEDILOL 12.5 MG: 12.5 TABLET, FILM COATED ORAL at 08:10

## 2022-08-12 RX ADMIN — LACTULOSE 30 G: 10 SOLUTION ORAL at 08:10

## 2022-08-12 RX ADMIN — Medication 100 MG: at 08:10

## 2022-08-12 RX ADMIN — ACETAMINOPHEN 650 MG: 325 TABLET, FILM COATED ORAL at 08:10

## 2022-08-12 RX ADMIN — COLCHICINE 0.6 MG: 0.6 TABLET, FILM COATED ORAL at 16:12

## 2022-08-12 RX ADMIN — Medication 10 ML: at 08:12

## 2022-08-12 RX ADMIN — Medication 1 TABLET: at 08:10

## 2022-08-12 RX ADMIN — METHYLPREDNISOLONE 24 MG: 4 TABLET ORAL at 16:12

## 2022-08-12 RX ADMIN — POTASSIUM CHLORIDE 40 MEQ: 750 TABLET, EXTENDED RELEASE ORAL at 16:12

## 2022-08-12 RX ADMIN — Medication 1 MG: at 08:10

## 2022-08-12 RX ADMIN — Medication 220 MG: at 08:10

## 2022-08-12 RX ADMIN — RIFAXIMIN 550 MG: 550 TABLET ORAL at 08:10

## 2022-08-12 RX ADMIN — OXYCODONE HYDROCHLORIDE 5 MG: 5 TABLET ORAL at 03:34

## 2022-08-12 RX ADMIN — OXYCODONE 10 MG: 5 TABLET ORAL at 17:43

## 2022-08-12 RX ADMIN — POTASSIUM CHLORIDE 40 MEQ: 750 TABLET, EXTENDED RELEASE ORAL at 08:10

## 2022-08-12 RX ADMIN — ALLOPURINOL 100 MG: 100 TABLET ORAL at 08:10

## 2022-08-12 RX ADMIN — FAMOTIDINE 20 MG: 20 TABLET ORAL at 17:43

## 2022-08-12 RX ADMIN — CARVEDILOL 12.5 MG: 12.5 TABLET, FILM COATED ORAL at 17:44

## 2022-08-12 RX ADMIN — Medication 10 ML: at 21:24

## 2022-08-12 NOTE — THERAPY TREATMENT NOTE
Patient Name: Macario Carpio  : 1970    MRN: [id number]                              Today's Date: 2022       Admit Date: 2022    Visit Dx:     ICD-10-CM ICD-9-CM   1. Hepatic encephalopathy (HCC)  K72.90 572.2   2. Uremic encephalopathy  G93.49 348.31    N19    3. Acute kidney injury (HCC)  N17.9 584.9   4. Hypomagnesemia  E83.42 275.2   5. Alcoholic cirrhosis, unspecified whether ascites present (HCC)  K70.30 571.2   6. Alcoholic cirrhosis of liver without ascites (HCC)  K70.30 571.2     Patient Active Problem List   Diagnosis   • Iron deficiency anemia   • Hepatic cirrhosis (HCC)   • Screening for malignant neoplasm of colon   • Abdominal aortic aneurysm (AAA) (HCC)   • Hepatitis C virus infection   • Gout   • Hypercholesterolemia   • Hepatic cirrhosis (HCC)   • Thoracic aortic aneurysm without rupture (HCC)   • Hypertensive disorder   • Hip pain   • Chronic pain   • Bicuspid aortic valve   • Arthritis of right hip   • Aortic valve regurgitation   • Anxiety   • Abdominal aortic aneurysm (AAA) (HCC)   • Hepatic cirrhosis (HCC)   • Hepatitis C virus infection   • Chronic pain   • Hepatic encephalopathy (HCC)   • Alcoholic cirrhosis of liver without ascites (HCC)   • YANNI (acute kidney injury) (HCC)   • Hypomagnesemia   • Tophaceous gout     Past Medical History:   Diagnosis Date   • Gout    • Hepatitis C      Past Surgical History:   Procedure Laterality Date   • ENDOSCOPY N/A 2022    Procedure: ESOPHAGOGASTRODUODENOSCOPY WITH COLD BIOPSIES, BAND PLACEMENT X 3;  Surgeon: Macario Dent MD;  Location: [facility] ENDOSCOPY;  Service: Gastroenterology;  Laterality: N/A;  PRE- CIRRHOSIS, EVAL OF VARICES  POST- GASTRIC ULCER, GASTRITIS. PORTAL HYPERTENSIVE GASTROPATHY, GASTRIC & ESOPHAGEAL VARICES   • TOE AMPUTATION     • TOTAL HIP ARTHROPLASTY Right 2020      General Information     Row Name 22 0165          Physical Therapy Time and Intention    Document Type therapy note (daily note)   -     Mode of Treatment individual therapy;physical therapy  -     Row Name 08/12/22 1352          General Information    Patient Profile Reviewed yes  -     Existing Precautions/Restrictions fall  -     Row Name 08/12/22 1352          Cognition    Orientation Status (Cognition) oriented to;person;place  -     Row Name 08/12/22 1352          Safety Issues, Functional Mobility    Impairments Affecting Function (Mobility) endurance/activity tolerance;strength;range of motion (ROM);cognition;coordination;motor control;motor planning  -           User Key  (r) = Recorded By, (t) = Taken By, (c) = Cosigned By    Initials Name Provider Type     Nayely Glover PT Physical Therapist               Mobility     Row Name 08/12/22 1353          Bed Mobility    Bed Mobility supine-sit;sit-supine  -     Supine-Sit Shubert (Bed Mobility) moderate assist (50% patient effort);2 person assist;verbal cues  -     Sit-Supine [place]ubert (Bed Mobility) moderate assist (50% patient effort);2 person assist;verbal cues  -     Assistive Device (Bed Mobility) draw sheet;head of bed elevated  -     Row Name 08/12/22 1353          Sit-Stand Transfer    Sit-Stand Shubert (Transfers) 2 person assist;verbal cues;nonverbal cues (demo/gesture);moderate assist (50% patient effort)  -     Assistive Device (Sit-Stand Transfers) other (see comments)  Lifting from underarm  -     Comment, (Sit-Stand Transfer) Stood for ~2 minutes, fatigued toward end with increasing hip/knee flexion and max cues for upright posture  -     Row Name 08/12/22 1353          Gait/Stairs (Locomotion)    Shubert Level (Gait) unable to assess  -           User Key  (r) = Recorded By, (t) = Taken By, (c) = Cosigned By    Initials Name Provider Type     Nayely Glover PT Physical Therapist               Obj/Interventions     Row Name 08/12/22 1359          Motor Skills    Therapeutic Exercise --  5 reps B AP/LAQ/seated jeni   -           User Key  (r) = Recorded By, (t) = Taken By, (c) = Cosigned By    Initials Name Provider Type     Nayely Glover PT Physical Therapist               Goals/Plan     Row Name 08/12/22 1404          Bed Mobility Goal 1 (PT)    Activity/Assistive Device (Bed Mobility Goal 1, PT) bed mobility activities, all  -BH     [place] Level/Cues Needed (Bed Mobility Goal 1, PT) standby assist  -     Time Frame (Bed Mobility Goal 1, PT) 1 week  -     Progress/Outcomes (Bed Mobility Goal 1, PT) goal revised this date  -     Row Name 08/12/22 1404          Transfer Goal 1 (PT)    Activity/Assistive Device (Transfer Goal 1, PT) transfers, all  -     [place] Level/Cues Needed (Transfer Goal 1, PT) contact guard required  -     Time Frame (Transfer Goal 1, PT) 1 week  -     Row Name 08/12/22 1404          Gait Training Goal 1 (PT)    Activity/Assistive Device (Gait Training Goal 1, PT) gait (walking locomotion);walker, rolling  -     [place] Level (Gait Training Goal 1, PT) contact guard required  -     Distance (Gait Training Goal 1, PT) 20ft  -     Time Frame (Gait Training Goal 1, PT) 1 week  -     Progress/Outcome (Gait Training Goal 1, PT) goal revised this date  -           User Key  (r) = Recorded By, (t) = Taken By, (c) = Cosigned By    Initials Name Provider Type     Nayely Glover PT Physical Therapist               Clinical Impression     Row Name 08/12/22 1400          Pain    Pre/Posttreatment Pain Comment Continued pain in hands/BLE  -     Pain Intervention(s) Repositioned;Ambulation/increased activity;Rest  -     Row Name 08/12/22 1400          Plan of Care Review    Plan of Care Reviewed With patient  -     Progress improving  -     Outcome Evaluation Pt agreeable to PT this PM, a little more confused today than last session requiring re-orientation to being in the hospital. Pt transferred to EOB with mod A x2 and STS with mod A x2. Pt demonstrated  improved standing balance today, able to stand for ~2 minutes while sheets and brief were changed. Pt fatigued towards end with increased hip/knee flexion and requiring more assist to maintain standing balance. Pt assisted back to bed and repositioned, left with all needs met. Notably fatigued following increased time in standing. PT will continue to follow and progress as able.  -[facility] Name 08/12/22 1400          Vital Signs    O2 Delivery Pre Treatment room air  -     O2 Delivery Intra Treatment room air  -     O2 Delivery Post Treatment room air  -     Row Name 08/12/22 1400          Positioning and Restraints    Pre-Treatment Position in bed  -     Post Treatment Position bed  -     In Bed supine;call light within reach;encouraged to call for assist;exit alarm on;notified [facility]  -           User Key  (r) = Recorded By, (t) = Taken By, (c) = Cosigned By    Initials Name Provider Type    Nayely Lechuga PT Physical Therapist               Outcome Measures     Row Name 08/12/22 1405          How much help from another person do you currently need...    Turning from your back to your side while in flat bed without using bedrails? 2  -BH     Moving from lying on back to sitting on the side of a flat bed without bedrails? 2  -BH     Moving to and from a bed to a chair (including a wheelchair)? 1  -BH     Standing up from a chair using your arms (e.g., wheelchair, bedside chair)? 2  -BH     Climbing 3-5 steps with a railing? 1  -BH     To walk in hospital room? 1  -     AM-PAC 6 Clicks Score (PT) 9  -     Highest level of mobility 3 --> Sat at edge of bed  -     Row Name 08/12/22 1405          Functional Assessment    Outcome Measure Options AM-PAC 6 Clicks Basic Mobility (PT)  -           User Key  (r) = Recorded By, (t) = Taken By, (c) = Cosigned By    Initials Name Provider Type    Nayely Lechuga PT Physical Therapist                             Physical Therapy Education                  Title: PT OT SLP Therapies (Done)     Topic: Physical Therapy (Done)     Point: Mobility training (Done)     Learning Progress Summary           Patient Acceptance, E,TB,D, VU,NR by  at 8/12/2022 1405    Acceptance, E,TB,D, NR by  at 8/11/2022 1641    Acceptance, D,E, NR,NL by EB at 8/9/2022 1619    Acceptance, E,TB,D, VU,NR by  at 8/8/2022 1631    Acceptance, E,TB, VU by TALA at 8/6/2022 0431    Acceptance, E, VU by KN at 8/5/2022 1515    Comment: Pt educated on safe positioning when seated EOB.    Acceptance, E, VU by  at 8/5/2022 1513                   Point: Home exercise program (Done)     Learning Progress Summary           Patient Acceptance, E,TB,D, VU,NR by  at 8/12/2022 1405    Acceptance, E,TB,D, NR by  at 8/11/2022 1641    Acceptance, D,E, NR,NL by EB at 8/9/2022 1619    Acceptance, E,TB,D, VU,NR by  at 8/8/2022 1631    Acceptance, E,TB, VU by TALA at 8/6/2022 0431    Acceptance, E, VU by MIKAYLA at 8/5/2022 1515    Comment: Pt educated on safe positioning when seated EOB.    Acceptance, E, VU by  at 8/5/2022 1513                   Point: Body mechanics (Done)     Learning Progress Summary           Patient Acceptance, E,TB,D, VU,NR by  at 8/12/2022 1405    Acceptance, E,TB,D, NR by  at 8/11/2022 1641    Acceptance, D,E, NR,NL by EB at 8/9/2022 1619    Acceptance, E,TB,D, VU,NR by  at 8/8/2022 1631    Acceptance, E,TB, VU by TALA at 8/6/2022 0431    Acceptance, E, VU by MIKAYLA at 8/5/2022 1515    Comment: Pt educated on safe positioning when seated EOB.    Acceptance, E, VU by  at 8/5/2022 1513                   Point: Precautions (Done)     Learning Progress Summary           Patient Acceptance, E,TB,D, VU,NR by  at 8/12/2022 1405    Acceptance, E,TB,D, NR by  at 8/11/2022 1641    Acceptance, D,E, NR,NL by EB at 8/9/2022 1619    Acceptance, E,TB,D, VU,NR by  at 8/8/2022 1631    Acceptance, E,TB, VU by KL at 8/6/2022 0431    Acceptance, E, VU by  at 8/5/2022 1515    Comment: Pt  educated on safe positioning when seated EOB.    Acceptance, E, VU by  at 8/5/2022 1513                               User Key     Initials Effective Dates Name Provider Type Discipline    EB 06/16/21 -  Mary Samaniego PTA Physical Therapist Assistant PT    KL 06/16/21 -  Nola Atkinson, RN Registered Nurse Nurse     04/08/22 -  Nayely Glover, PT Physical Therapist PT     08/02/22 -  Beto Smith OT Occupational Therapist OT     05/02/22 -  Valarie Haley PT Physical Therapist PT              PT Recommendation and Plan     Plan of Care Reviewed With: patient  Progress: improving  Outcome Evaluation: Pt agreeable to PT this PM, a little more confused today than last session requiring re-orientation to being in the hospital. Pt transferred to EOB with mod A x2 and STS with mod A x2. Pt demonstrated improved standing balance today, able to stand for ~2 minutes while sheets and brief were changed. Pt fatigued towards end with increased hip/knee flexion and requiring more assist to maintain standing balance. Pt assisted back to bed and repositioned, left with all needs met. Notably fatigued following increased time in standing. PT will continue to follow and progress as able.     Time Calculation:    PT Charges     Row Name 08/12/22 1406             Time Calculation    Start Time 1112  -      Stop Time 1136  -      Time Calculation (min) 24 min  -      PT Received On 08/12/22  -      PT - Next Appointment 08/13/22  -      PT Goal Re-Cert Due Date 08/19/22  -              Time Calculation- PT    Total Timed Code Minutes- PT 24 minute(s)  -              Timed Charges    50601 - PT Therapeutic Activity Minutes 24  -              Total Minutes    Timed Charges Total Minutes 24  -       Total Minutes 24  -            User Key  (r) = Recorded By, (t) = Taken By, (c) = Cosigned By    Initials Name Provider Type     Nayely Glover, PT Physical Therapist              Therapy Charges for  Today     Code Description Service Date Service Provider Modifiers Qty    40650430033  PT THERAPEUTIC ACT EA 15 MIN 8/11/2022 Nayely Glover, PT GP 1    [id number]  PT THER SUPP EA 15 MIN 8/11/2022 Nayely Glover, PT GP 1    [id number]  PT THERAPEUTIC ACT EA 15 MIN 8/12/2022 Nayely Glover, PT GP 2          PT G-Codes  Outcome Measure Options: AM-PAC 6 Clicks Basic Mobility (PT)  AM-PAC 6 Clicks Score (PT): 9  AM-PAC 6 Clicks Score (OT): 13  Modified [place] Scale: 4 - Moderately severe disability.  Unable to walk without assistance, and unable to attend to own bodily needs without assistance.    Nayely Glover, PT  8/12/2022

## 2022-08-12 NOTE — PROGRESS NOTES
"DAILY PROGRESS NOTE  [facility]    Patient Identification:  Name: Macario Carpio  Age: 51 y.o.  Sex: male  :  1970  MRN: [id number]         Primary Care Physician: Andrew Grayson APRN      Subjective  Patient complains of increased gout pain today.   Discussed his history of gout and what he is used to take it.  He states he mostly just takes oxycodone.  He does later note that he was on colchicine.  It appears this was on his med rec but was not continued.  It looks like he is also been on steroids in the past as well as a Umanzor 2 inhibitor.    Objective:  General Appearance:  Well-appearing, in no acute distress, not in pain and comfortable.    Vital signs: (most recent): Blood pressure 129/79, pulse 69, temperature 98.6 °F (37 °C), temperature source Oral, resp. rate 18, height 180.3 cm (71\"), weight 100 kg (221 lb), SpO2 94 %.    Lungs:  Normal effort and normal respiratory rate.  Breath sounds clear to auscultation.    Heart: Normal rate.  Regular rhythm.    Abdomen: Abdomen is soft.    Extremities: There is no dependent edema.  (MP joints 3 through 5 on the right hand are inflamed.  Tender to touch.  There is surrounding edema also.  To less severe extent similar findings are noted on the left hand.)  Neurological: Patient is alert and oriented to person, place and time.    Skin:  Warm and dry.                Vital signs in last 24 hours:  Temp:  [98.3 °F (36.8 °C)-100.8 °F (38.2 °C)] 98.3 °F (36.8 °C)  Heart Rate:  [78-80] 80  Resp:  [18-20] 20  BP: (103-130)/(72-74) 103/74    Intake/Output:    Intake/Output Summary (Last 24 hours) at 2022 1408  Last data filed at 2022 0812  Gross per 24 hour   Intake 1312 ml   Output --   Net 1312 ml         Results from last 7 days   Lab Units 22  0621 22  0535 08/10/22  0552 22  0643 22  0646 22  1111 22  0305   WBC 10*3/mm3 7.14 10.30 4.94 4.14 6.02 8.25 10.18   HEMOGLOBIN g/dL 8.8* 9.4* 9.5* 9.2* " 9.2* 9.4* 9.7*   PLATELETS 10*3/mm3 118* 151 124* 127* 129* 119* 89*     Results from last 7 days   Lab Units 08/12/22 0621 08/11/22  0535 08/10/22  0552 08/09/22  1415 08/09/22  0643 08/08/22  0646 08/07/22  1111 08/06/22  0304   SODIUM mmol/L 132* 133* 135*  --  135* 134* 135* 130*   POTASSIUM mmol/L 3.5 3.8 3.1* 3.6 3.5 3.0* 3.5 3.9   CHLORIDE mmol/L 103 104 107  --  105 105 102 101   CO2 mmol/L 18.2* 17.0* 18.3*  --  20.0* 21.0* 20.6* 18.0*   BUN mg/dL 8 7 7  --  12 15 15 9   CREATININE mg/dL 0.56* 0.54* 0.50*  --  0.54* 0.57* 0.60* 0.61*   GLUCOSE mg/dL 136* 140* 96  --  100* 97 121* 102*   Estimated Creatinine Clearance: 188.1 mL/min (A) (by C-G formula based on SCr of 0.56 mg/dL (L)).  Results from last 7 days   Lab Units 08/12/22 0621 08/11/22  0535 08/10/22  0552 08/09/22  0643 08/08/22  1949 08/08/22  0646 08/07/22  1111 08/06/22  1833 08/06/22  1039 08/06/22  0304 08/05/22  1958 08/05/22  1541   CALCIUM mg/dL 8.6 8.8 8.5* 8.6  --  8.7 8.4*  --   --  8.0*  --   --    ALBUMIN g/dL 2.60* 2.70* 2.60* 2.60*  --  2.70* 2.70*  --   --  3.00*  --   --    MAGNESIUM mg/dL  --   --   --   --   --  2.4 1.4*  --   --  1.7  --   --    PHOSPHORUS mg/dL  --   --   --   --  2.9  --  2.2* 2.8 2.1* 2.1* 2.0* 2.2*     Results from last 7 days   Lab Units 08/12/22  0621 08/11/22  0535 08/10/22  0552 08/09/22  0643 08/08/22  0646 08/07/22  1111 08/06/22  0304   ALBUMIN g/dL 2.60* 2.70* 2.60* 2.60* 2.70* 2.70* 3.00*   BILIRUBIN mg/dL 1.0 1.1 0.8 0.8 1.1 1.3* 1.5*   ALK PHOS U/L 157* 154* 139* 124* 124* 111 119*   AST (SGOT) U/L 99* 115* 141* 177* 225* 56* 67*   ALT (SGPT) U/L 49* 56* 60* 60* 60* 22 22       Assessment:  Hepatic encephalopathy: Resolved.  Continue rifaximin and lactulose.  Alcoholic cirrhosis/HCV: Associated with multiple complications thereof.  Grade 2 esophageal varices: Status post banding 8/7/2022.  Hypomagnesemia: Replaced.  Hypophosphatemia: Replace.  Hyponatremia: Mild.  If progresses then fluid  restriction.  Alcohol abuse:  Cannabinoid abuse:  Chronic pancreatitis:  Chronic pain-chronic narcotic dependence: Avoid escalation.  YANNI: Resolved.  Deconditioning:  Gout with acute flare: Resume colchicine but at a lower dose noting his cirrhosis as well as use of Coreg.  We will also start a Medrol Dosepak.  Once the Medrol Dosepak is completed he could resume a Umanzor 2 inhibitor.  Low-grade fever: No evidence of bacterial infection.  Monitor.  Possibly associated with a gouty flare.      Plan:  Please see above.  Discussed with CCP.    Bhanu Hernandez MD  8/12/2022  14:08 EDT

## 2022-08-12 NOTE — PLAN OF CARE
Problem: Adult Inpatient Plan of Care  Goal: Plan of Care Review  Outcome: Ongoing, Progressing  Flowsheets  Taken 8/12/2022 1842 by Cande Tripathi RN  Plan of Care Reviewed With: patient  Outcome Evaluation: VSS. MD started colchicine and steroid dose pack for gout, patient pain seems somewhat improved. Pt is feeding self this evening. Worked with PT today. Incontinence care and turns provided. Plans to d/c to [facility] for [facility], likely tomorrow.  Taken 8/12/2022 1400 by Nayely Glover PT  Progress: improving  Goal: Patient-Specific Goal (Individualized)  Outcome: Ongoing, Progressing  Goal: Absence of Hospital-Acquired Illness or Injury  Outcome: Ongoing, Progressing  Intervention: Identify and Manage Fall Risk  Recent Flowsheet Documentation  Taken 8/12/2022 1801 by Cande Tripathi RN  Safety Promotion/Fall Prevention:   safety round/check completed   room organization consistent   nonskid shoes/slippers when out of bed   fall prevention program maintained   clutter free environment maintained   assistive device/personal items within reach   activity supervised  Taken 8/12/2022 1616 by Cande Tripathi RN  Safety Promotion/Fall Prevention:   safety round/check completed   room organization consistent   nonskid shoes/slippers when out of bed   fall prevention program maintained   clutter free environment maintained   assistive device/personal items within reach   activity supervised  Taken 8/12/2022 1426 by Cande Tripathi, RN  Safety Promotion/Fall Prevention:   safety round/check completed   room organization consistent   nonskid shoes/slippers when out of bed   fall prevention program maintained   clutter free environment maintained   assistive device/personal items within reach   activity supervised  Taken 8/12/2022 1212 by Cande Tripathi, RN  Safety Promotion/Fall Prevention:   safety round/check completed   room organization consistent   nonskid shoes/slippers when out of bed   fall  prevention program maintained   clutter free environment maintained   assistive device/personal items within reach   activity supervised  Taken 8/12/2022 1035 by Cande Tripathi RN  Safety Promotion/Fall Prevention:   safety round/check completed   room organization consistent   nonskid shoes/slippers when out of bed   fall prevention program maintained   clutter free environment maintained   assistive device/personal items within reach   activity supervised  Taken 8/12/2022 0812 by Cadne Tripathi RN  Safety Promotion/Fall Prevention:   safety round/check completed   room organization consistent   nonskid shoes/slippers when out of bed   fall prevention program maintained   clutter free environment maintained   assistive device/personal items within reach   activity supervised  Taken 8/12/2022 0726 by Cande Tripathi RN  Safety Promotion/Fall Prevention: safety round/check completed  Intervention: Prevent Skin Injury  Recent Flowsheet Documentation  Taken 8/12/2022 1801 by Cande Tripathi RN  Body Position:   weight shifting   tilted   right  Taken 8/12/2022 1616 by Cande Tripathi RN  Body Position: supine, legs elevated  Taken 8/12/2022 1426 by Cande Tripathi RN  Body Position:   tilted   left  Skin Protection:   adhesive use limited   incontinence pads utilized  Taken 8/12/2022 1212 by Cande Tripathi RN  Body Position: sitting up in bed  Taken 8/12/2022 1035 by Cande Tripathi RN  Body Position:   tilted   left  Taken 8/12/2022 0812 by Cande Tripathi RN  Body Position:   left   tilted  Skin Protection:   adhesive use limited   incontinence pads utilized  Intervention: Prevent and Manage VTE (Venous Thromboembolism) Risk  Recent Flowsheet Documentation  Taken 8/12/2022 1801 by Cande Tripathi RN  Activity Management: activity adjusted per tolerance  Taken 8/12/2022 1616 by Cande Tripathi RN  Activity Management: activity adjusted per tolerance  Taken 8/12/2022 1426 by Cande Tripathi  RN  Activity Management: activity adjusted per tolerance  VTE Prevention/Management:   bilateral   sequential compression devices on  Taken 8/12/2022 1212 by Cande Tripathi RN  Activity Management: activity adjusted per tolerance  Taken 8/12/2022 1035 by Cande Tripathi RN  Activity Management: activity encouraged  Taken 8/12/2022 0812 by Cande Tripathi RN  Activity Management:   activity encouraged   activity adjusted per tolerance  VTE Prevention/Management:   bilateral   sequential compression devices on  Range of Motion: active ROM (range of motion) encouraged  Intervention: Prevent Infection  Recent Flowsheet Documentation  Taken 8/12/2022 0812 by Cande Tripathi RN  Infection Prevention:   single patient room provided   rest/sleep promoted   hand hygiene promoted  Goal: Optimal Comfort and Wellbeing  Outcome: Ongoing, Progressing  Intervention: Monitor Pain and Promote Comfort  Recent Flowsheet Documentation  Taken 8/12/2022 1426 by Cande Tripathi RN  Pain Management Interventions:   care clustered   quiet environment facilitated   position adjusted   pillow support provided  Taken 8/12/2022 1100 by Cande Tripathi RN  Pain Management Interventions: see MAR  Taken 8/12/2022 0812 by Cande Tripathi RN  Pain Management Interventions: see MAR  Intervention: Provide Person-Centered Care  Recent Flowsheet Documentation  Taken 8/12/2022 0812 by Cande Tripathi RN  Trust Relationship/Rapport:   care explained   choices provided   emotional support provided   questions answered   reassurance provided   thoughts/feelings acknowledged  Goal: Readiness for Transition of Care  Outcome: Ongoing, Progressing     Problem: Fall Injury Risk  Goal: Absence of Fall and Fall-Related Injury  Outcome: Ongoing, Progressing  Intervention: Identify and Manage Contributors  Recent Flowsheet Documentation  Taken 8/12/2022 1801 by Cande Tripathi RN  Medication Review/Management: medications reviewed  Taken 8/12/2022  1616 by Cande Tripathi, RN  Medication Review/Management: medications reviewed  Taken 8/12/2022 1035 by Cande Tripathi, RN  Medication Review/Management: medications reviewed  Taken 8/12/2022 0812 by Cande Tripathi RN  Medication Review/Management: medications reviewed  Intervention: Promote Injury-Free Environment  Recent Flowsheet Documentation  Taken 8/12/2022 1801 by Cande Tripathi, RN  Safety Promotion/Fall Prevention:   safety round/check completed   room organization consistent   nonskid shoes/slippers when out of bed   fall prevention program maintained   clutter free environment maintained   assistive device/personal items within reach   activity supervised  Taken 8/12/2022 1616 by Cande Tripathi RN  Safety Promotion/Fall Prevention:   safety round/check completed   room organization consistent   nonskid shoes/slippers when out of bed   fall prevention program maintained   clutter free environment maintained   assistive device/personal items within reach   activity supervised  Taken 8/12/2022 1426 by Cande Triapthi RN  Safety Promotion/Fall Prevention:   safety round/check completed   room organization consistent   nonskid shoes/slippers when out of bed   fall prevention program maintained   clutter free environment maintained   assistive device/personal items within reach   activity supervised  Taken 8/12/2022 1212 by Cande Tripathi, RN  Safety Promotion/Fall Prevention:   safety round/check completed   room organization consistent   nonskid shoes/slippers when out of bed   fall prevention program maintained   clutter free environment maintained   assistive device/personal items within reach   activity supervised  Taken 8/12/2022 1035 by Cande Tripathi, RN  Safety Promotion/Fall Prevention:   safety round/check completed   room organization consistent   nonskid shoes/slippers when out of bed   fall prevention program maintained   clutter free environment maintained   assistive  device/personal items within reach   activity supervised  Taken 8/12/2022 0812 by Cande Tripathi RN  Safety Promotion/Fall Prevention:   safety round/check completed   room organization consistent   nonskid shoes/slippers when out of bed   fall prevention program maintained   clutter free environment maintained   assistive device/personal items within reach   activity supervised  Taken 8/12/2022 0726 by Cande Tripathi RN  Safety Promotion/Fall Prevention: safety round/check completed     Problem: Skin Injury Risk Increased  Goal: Skin Health and Integrity  Outcome: Ongoing, Progressing  Intervention: Optimize Skin Protection  Recent Flowsheet Documentation  Taken 8/12/2022 1801 by Cande Tripathi RN  Head of Bed (HOB) Positioning: HOB at 30-45 degrees  Taken 8/12/2022 1616 by Cande Tripathi RN  Head of Bed (HOB) Positioning: HOB at 30-45 degrees  Taken 8/12/2022 1426 by Cande Tripathi RN  Pressure Reduction Techniques:   frequent weight shift encouraged   weight shift assistance provided   heels elevated off bed  Head of Bed (HOB) Positioning: HOB at 30 degrees  Pressure Reduction Devices:   alternating pressure pump (ADD)   pressure-redistributing mattress utilized  Skin Protection:   adhesive use limited   incontinence pads utilized  Taken 8/12/2022 1212 by Cande Tripathi RN  Head of Bed (HOB) Positioning:   HOB at 45 degrees   HOB at 60 degrees  Taken 8/12/2022 1035 by Cande Tripathi RN  Head of Bed (HOB) Positioning: HOB at 30 degrees  Taken 8/12/2022 0812 by Cande Tripathi RN  Pressure Reduction Techniques:   frequent weight shift encouraged   weight shift assistance provided  Head of Bed (HOB) Positioning: HOB at 30-45 degrees  Pressure Reduction Devices:   alternating pressure pump (ADD)   pressure-redistributing mattress utilized  Skin Protection:   adhesive use limited   incontinence pads utilized     Problem: Pain Chronic (Persistent)  Goal: Acceptable Pain Control and Functional  Ability  Outcome: Ongoing, Progressing  Intervention: Manage Persistent Pain  Recent Flowsheet Documentation  Taken 8/12/2022 1801 by Cande Tripathi RN  Medication Review/Management: medications reviewed  Taken 8/12/2022 1616 by Cande Tripathi RN  Medication Review/Management: medications reviewed  Taken 8/12/2022 1035 by Cande Tripathi RN  Medication Review/Management: medications reviewed  Taken 8/12/2022 0812 by Cande Tripathi RN  Medication Review/Management: medications reviewed  Intervention: Develop Pain Management Plan  Recent Flowsheet Documentation  Taken 8/12/2022 1426 by Cande Tripathi RN  Pain Management Interventions:   care clustered   quiet environment facilitated   position adjusted   pillow support provided  Taken 8/12/2022 1100 by Cande Tripathi RN  Pain Management Interventions: see MAR  Taken 8/12/2022 0812 by Cande Tripathi RN  Pain Management Interventions: see MAR  Intervention: Optimize Psychosocial Wellbeing  Recent Flowsheet Documentation  Taken 8/12/2022 0812 by Cande Tripathi RN  Diversional Activities: television   Goal Outcome Evaluation:  Plan of Care Reviewed With: patient           Outcome Evaluation: VSS. MD started colchicine and steroid dose pack for gout, patient pain seems somewhat improved. Pt is feeding self this evening. Worked with PT today. Incontinence care and turns provided. Plans to d/c to [facility] for [facility], likely tomorrow.

## 2022-08-12 NOTE — PLAN OF CARE
Goal Outcome Evaluation:  Plan of Care Reviewed With: patient        Progress: improving  Outcome Evaluation: Pt agreeable to PT this PM, a little more confused today than last session requiring re-orientation to being in the hospital. Pt transferred to EOB with mod A x2 and STS with mod A x2. Pt demonstrated improved standing balance today, able to stand for ~2 minutes while sheets and brief were changed. Pt fatigued towards end with increased hip/knee flexion and requiring more assist to maintain standing balance. Pt assisted back to bed and repositioned, left with all needs met. Notably fatigued following increased time in standing. PT will continue to follow and progress as able.

## 2022-08-12 NOTE — NURSING NOTE
"  Access center follow up.    Pt. resting in bed with eyes closed. He is alert and oriented x2(person,place). Pt. knows he is in the hospital but could not recall name of hospital. When asked pt. the year he states \"2009.\" Re-oriented pt. to time/place. Per note pt. quiet most of the overnight shift. This am pt. drowsy, reports feeling tired, speech slurred at times. Appetite: fair, eating % of meals. Last CIWA 1. He continues on celexa 20mg daily. D/C pending for SNF placement. Pt. previously declined substance abuse treatment resources. Access following.      "

## 2022-08-12 NOTE — CASE MANAGEMENT/SOCIAL WORK
Continued Stay Note  [facility]     Patient Name: Macario Carpio  MRN: [id number]  Today's Date: 8/12/2022    Admit Date: 7/31/2022     Discharge Plan     Row Name 08/12/22 1347       Plan    Plan [facility]- Pre-cert approved, bed available 8/13.    Patient/Family in Agreement with Plan yes    Plan Comments Spoke with [facility]/Ricardo Curtis who stated pre-cert is approved and patient can admit 8/12-8/13. Informed [facility] patient would not DC today r/t fever. [facility] confirmed patient needs new Covid test prior to admission. Covid test ordered for 8/13 at 0900. Dr. Hernandez notified of pre-cert and bed availability. Patient will need transportation. Packet in [place]. CCP to follow. Mike EPPERSON               Discharge Codes    No documentation.               Expected Discharge Date and Time     Expected Discharge Date Expected Discharge Time    Aug 13, 2022             Mike Johnson RN

## 2022-08-13 VITALS
TEMPERATURE: 98.5 F | OXYGEN SATURATION: 94 % | DIASTOLIC BLOOD PRESSURE: 86 MMHG | HEIGHT: 71 IN | BODY MASS INDEX: 30.94 KG/M2 | WEIGHT: 221 LBS | RESPIRATION RATE: 18 BRPM | SYSTOLIC BLOOD PRESSURE: 130 MMHG | HEART RATE: 79 BPM

## 2022-08-13 LAB
ALBUMIN SERPL-MCNC: 2.7 G/DL (ref 3.5–5.2)
ALBUMIN/GLOB SERPL: 0.7 G/DL
ALP SERPL-CCNC: 177 U/L (ref 39–117)
ALT SERPL W P-5'-P-CCNC: 83 U/L (ref 1–41)
ANION GAP SERPL CALCULATED.3IONS-SCNC: 11.2 MMOL/L (ref 5–15)
AST SERPL-CCNC: 206 U/L (ref 1–40)
BASOPHILS # BLD AUTO: 0.01 10*3/MM3 (ref 0–0.2)
BASOPHILS NFR BLD AUTO: 0.3 % (ref 0–1.5)
BILIRUB SERPL-MCNC: 0.8 MG/DL (ref 0–1.2)
BUN SERPL-MCNC: 10 MG/DL (ref 6–20)
BUN/CREAT SERPL: 18.2 (ref 7–25)
CALCIUM SPEC-SCNC: 8.9 MG/DL (ref 8.6–10.5)
CHLORIDE SERPL-SCNC: 105 MMOL/L (ref 98–107)
CO2 SERPL-SCNC: 18.8 MMOL/L (ref 22–29)
CREAT SERPL-MCNC: 0.55 MG/DL (ref 0.76–1.27)
DEPRECATED RDW RBC AUTO: 42.5 FL (ref 37–54)
EGFRCR SERPLBLD CKD-EPI 2021: 120 ML/MIN/1.73
EOSINOPHIL # BLD AUTO: 0 10*3/MM3 (ref 0–0.4)
EOSINOPHIL NFR BLD AUTO: 0 % (ref 0.3–6.2)
ERYTHROCYTE [DISTWIDTH] IN BLOOD BY AUTOMATED COUNT: 13.3 % (ref 12.3–15.4)
GLOBULIN UR ELPH-MCNC: 3.8 GM/DL
GLUCOSE SERPL-MCNC: 138 MG/DL (ref 65–99)
HCT VFR BLD AUTO: 25.7 % (ref 37.5–51)
HGB BLD-MCNC: 8.9 G/DL (ref 13–17.7)
LYMPHOCYTES # BLD AUTO: 0.32 10*3/MM3 (ref 0.7–3.1)
LYMPHOCYTES NFR BLD AUTO: 10.9 % (ref 19.6–45.3)
MCH RBC QN AUTO: 30.9 PG (ref 26.6–33)
MCHC RBC AUTO-ENTMCNC: 34.6 G/DL (ref 31.5–35.7)
MCV RBC AUTO: 89.2 FL (ref 79–97)
MONOCYTES # BLD AUTO: 0.25 10*3/MM3 (ref 0.1–0.9)
MONOCYTES NFR BLD AUTO: 8.5 % (ref 5–12)
NEUTROPHILS NFR BLD AUTO: 2.35 10*3/MM3 (ref 1.7–7)
NEUTROPHILS NFR BLD AUTO: 80 % (ref 42.7–76)
PLATELET # BLD AUTO: 111 10*3/MM3 (ref 140–450)
PMV BLD AUTO: 11.5 FL (ref 6–12)
POTASSIUM SERPL-SCNC: 4.6 MMOL/L (ref 3.5–5.2)
PROT SERPL-MCNC: 6.5 G/DL (ref 6–8.5)
RBC # BLD AUTO: 2.88 10*6/MM3 (ref 4.14–5.8)
SARS-COV-2 RNA PNL SPEC NAA+PROBE: NOT DETECTED
SODIUM SERPL-SCNC: 135 MMOL/L (ref 136–145)
WBC NRBC COR # BLD: 2.94 10*3/MM3 (ref 3.4–10.8)

## 2022-08-13 PROCEDURE — 85025 COMPLETE CBC W/AUTO DIFF WBC: CPT | Performed by: INTERNAL MEDICINE

## 2022-08-13 PROCEDURE — 87635 SARS-COV-2 COVID-19 AMP PRB: CPT | Performed by: HOSPITALIST

## 2022-08-13 PROCEDURE — 63710000001 METHYLPREDNISOLONE 4 MG TABLET THERAPY PACK 21 EACH DISP PACK: Performed by: HOSPITALIST

## 2022-08-13 PROCEDURE — 80053 COMPREHEN METABOLIC PANEL: CPT | Performed by: INTERNAL MEDICINE

## 2022-08-13 RX ORDER — DIPHENOXYLATE HYDROCHLORIDE AND ATROPINE SULFATE 2.5; .025 MG/1; MG/1
1 TABLET ORAL DAILY
Qty: 30 TABLET
Start: 2022-08-14 | End: 2022-08-29 | Stop reason: HOSPADM

## 2022-08-13 RX ORDER — COLCHICINE 0.6 MG/1
0.6 TABLET ORAL DAILY
Start: 2022-08-14

## 2022-08-13 RX ORDER — ACETAMINOPHEN 325 MG/1
650 TABLET ORAL EVERY 4 HOURS PRN
Start: 2022-08-13 | End: 2022-08-29 | Stop reason: HOSPADM

## 2022-08-13 RX ORDER — SPIRONOLACTONE 25 MG/1
25 TABLET ORAL DAILY
Start: 2022-08-13

## 2022-08-13 RX ORDER — LACTULOSE 10 G/15ML
30 SOLUTION ORAL 3 TIMES DAILY
Start: 2022-08-13

## 2022-08-13 RX ORDER — FUROSEMIDE 40 MG/1
20 TABLET ORAL DAILY
Qty: 90 TABLET | Refills: 1
Start: 2022-08-13

## 2022-08-13 RX ORDER — FAMOTIDINE 20 MG/1
20 TABLET, FILM COATED ORAL
Start: 2022-08-13

## 2022-08-13 RX ORDER — CARVEDILOL 12.5 MG/1
12.5 TABLET ORAL 2 TIMES DAILY WITH MEALS
Start: 2022-08-13 | End: 2022-08-29 | Stop reason: HOSPADM

## 2022-08-13 RX ORDER — OXYCODONE HYDROCHLORIDE 10 MG/1
10 TABLET ORAL EVERY 6 HOURS PRN
Qty: 18 TABLET | Refills: 0 | Status: SHIPPED | OUTPATIENT
Start: 2022-08-13

## 2022-08-13 RX ADMIN — CARVEDILOL 12.5 MG: 12.5 TABLET, FILM COATED ORAL at 17:17

## 2022-08-13 RX ADMIN — ALLOPURINOL 100 MG: 100 TABLET ORAL at 09:32

## 2022-08-13 RX ADMIN — COLCHICINE 0.6 MG: 0.6 TABLET, FILM COATED ORAL at 09:32

## 2022-08-13 RX ADMIN — OXYCODONE 10 MG: 5 TABLET ORAL at 22:57

## 2022-08-13 RX ADMIN — METHYLPREDNISOLONE 8 MG: 4 TABLET ORAL at 20:58

## 2022-08-13 RX ADMIN — Medication 1 MG: at 09:32

## 2022-08-13 RX ADMIN — Medication 10 ML: at 09:34

## 2022-08-13 RX ADMIN — CITALOPRAM 20 MG: 20 TABLET, FILM COATED ORAL at 09:32

## 2022-08-13 RX ADMIN — OXYCODONE 10 MG: 5 TABLET ORAL at 09:31

## 2022-08-13 RX ADMIN — Medication 10 ML: at 20:58

## 2022-08-13 RX ADMIN — METHYLPREDNISOLONE 4 MG: 4 TABLET ORAL at 14:13

## 2022-08-13 RX ADMIN — FAMOTIDINE 20 MG: 20 TABLET ORAL at 09:32

## 2022-08-13 RX ADMIN — LACTULOSE 30 G: 10 SOLUTION ORAL at 17:17

## 2022-08-13 RX ADMIN — METHYLPREDNISOLONE 4 MG: 4 TABLET ORAL at 17:17

## 2022-08-13 RX ADMIN — FAMOTIDINE 20 MG: 20 TABLET ORAL at 17:17

## 2022-08-13 RX ADMIN — Medication 220 MG: at 09:32

## 2022-08-13 RX ADMIN — RIFAXIMIN 550 MG: 550 TABLET ORAL at 09:32

## 2022-08-13 RX ADMIN — RIFAXIMIN 550 MG: 550 TABLET ORAL at 20:58

## 2022-08-13 RX ADMIN — OXYCODONE 10 MG: 5 TABLET ORAL at 00:57

## 2022-08-13 RX ADMIN — METHYLPREDNISOLONE 4 MG: 4 TABLET ORAL at 09:33

## 2022-08-13 RX ADMIN — Medication 1 TABLET: at 09:32

## 2022-08-13 RX ADMIN — OXYCODONE 10 MG: 5 TABLET ORAL at 16:46

## 2022-08-13 RX ADMIN — Medication 100 MG: at 09:32

## 2022-08-13 NOTE — CASE MANAGEMENT/SOCIAL WORK
"Physicians Statement of Medical Necessity for  Ambulance Transportation    GENERAL INFORMATION     Name: Macario Carpio  YOB: 1970  Medicare #: [id number]  Transport Date: 8/13/2022 (Valid for round trips this date, or for scheduled repetitive trips for 60 days from the date signed below.)  Origin: [facility]  Destination: [facility]  Is the Patient's stay covered under Medicare Part A (PPS/DRG?)Yes  Closest appropriate facility? Yes  If this a hosp-hosp transfer? No  Is this a hospice patient? No    MEDICAL NECESSITY QUESTIONAIRE    Ambulance Transportation is medically necessary only if other means of transportation are contraindicated or would be potentially harmful to the patient.  To meet this requirement, the patient must be either \"bed confined\" or suffer from a condition such that transport by means other than an ambulance is contraindicated by the patient's condition.  The following questions must be answered by the healthcare professional signing below for this form to be valid:     1) Describe the MEDICAL CONDITION (physical and/or mental) of this patient AT THE TIME OF AMBULANCE TRANSPORT that requires the patient to be transported in an ambulance, and why transport by other means is contraindicated by the patient's condition:   Past Medical History:   Diagnosis Date   • Gout    • Hepatitis C       Past Surgical History:   Procedure Laterality Date   • ENDOSCOPY N/A 8/7/2022    Procedure: ESOPHAGOGASTRODUODENOSCOPY WITH COLD BIOPSIES, BAND PLACEMENT X 3;  Surgeon: Macario Dent MD;  Location: [facility] ENDOSCOPY;  Service: Gastroenterology;  Laterality: N/A;  PRE- CIRRHOSIS, EVAL OF VARICES  POST- GASTRIC ULCER, GASTRITIS. PORTAL HYPERTENSIVE GASTROPATHY, GASTRIC & ESOPHAGEAL VARICES   • TOE AMPUTATION     • TOTAL HIP ARTHROPLASTY Right 12/2020      2) Is this patient \"bed confined\" as defined below?Yes   To be \"bed confined\" the patient must satisfy all three of the " following criteria:  (1) unable to get up from bed without assistance; AND (2) unable to ambulate;  AND (3) unable to sit in a chair or wheelchair.  3) Can this patient safely be transported by car or wheelchair van (I.e., may safely sit during transport, without an attendant or monitoring?)No   4. In addition to completing questions 1-3 above, please check any of the following conditions that apply*:          *Note: supporting documentation for any boxes checked must be maintained in the patient's medical records Moderate/severe pain on movement and Medical attendant required      SIGNATURE OF PHYSICIAN OR OTHER AUTHORIZED HEALTHCARE PROFESSIONAL    I certify that the above information is true and correct based on my evaluation of this patient, and represent that the patient requires transport by ambulance and that other forms of transport are contraindicated.  I understand that this information will be used by the Centers for Medicare and Medicaid Services (CMS) to support the determiniation of medical necessity for ambulance services, and I represent that I have personal knowledge of the patient's condition at the time of transport.    x   If this box is checked, I also certify that the patient is physically or mentally incapable of signing the ambulance service's claim form and that the institution with which I am affiliated has furnished care, services or assistance to the patient.  My signature below is made on behalf of the patient pursuant to 42 .36(b)(4). In accordance with 42 .37, the specific reason(s) that the patient is physically or mentally incapable of signing the claim for is as follows:     Signature of Physician or Healthcare Professional  Date/Time:        (For Scheduled repetitive transport, this form is not valid for transports performed more than 60 days after this date).                                                                                                                                             --------------------------------------------------------------------------------------------  Printed Name and Credentials of Physician or Authorized Healthcare Professional     *Form must be signed by patient's attending physician for scheduled, repetitive transports,.  For non-repetitive ambulance transports, if unable to obtain the signature of the attending physician, any of the following may sign (please select below):     Physician  Clinical Nurse Specialist  Registered Nurse     Physician Assistant  Discharge Planner  Licensed Practical Nurse     Nurse Practitioner

## 2022-08-13 NOTE — DISCHARGE SUMMARY
PHYSICIAN DISCHARGE SUMMARY                                                                        [facility]    Patient Identification:  Name: Macario Carpio  Age: 51 y.o.  Sex: male  :  1970  MRN: [id number]  Primary Care Physician: Andrew Grayson APRN    Admit date: 2022  Discharge date and time: 2022     Discharged Condition: good    Discharge Diagnoses:  Hepatic encephalopathy: Resolved.  Continue rifaximin and lactulose.  Alcoholic cirrhosis/HCV:   Grade 2 esophageal varices: Status post banding 2022.  Hypomagnesemia: Replaced.  Hypophosphatemia: Replace.  Hyponatremia:  Alcohol abuse:  Cannabinoid abuse:  Chronic pancreatitis:  Chronic pain-chronic narcotic dependence:   YANNI:  Deconditioning:  Gout with acute flare:  Medrol Dosepak started.  Colchicine resumed.  Markedly improved today.      Hospital Course:  51-year-old gentleman with history of alcohol abuse, cannabinoid abuse, chronic narcotic use with history of alcoholic cirrhosis.  He presents with increased confusion, dehydrated.  Please H&P for full details.  On presentation he was in YANNI.  The patient was admitted.  Hydrated.  BUN and creatinine returned to baseline.  Rifaximin continued and lactulose dose increased.  Mental status did return to baseline.  Alcohol detox protocol was put into place.  He was seen by gastroenterology during hospitalization and underwent EGD.  Grade 2 esophageal varices were noted and he did undergo banding procedure on 2022.  He tolerated procedure well.  He had multiple electrolyte abnormalities that are frequently seen in cirrhosis and alcohol use presentation including hypomagnesemia, hypophosphatemia, hyponatremia.  All of these have been treated.  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  He did have a gouty flare yesterday.  Resume the colchicine but I do not low-dose  noting his cirrhosis as well as use of Coreg.  Also started a Medrol Dosepak.  He is significantly improved today.  Patient is medically stable but is weak and has had significant deconditioning.  Will be needing rehab and these arrangements have been made.        Consults:     Consults     Date and Time Order Name Status Description    8/3/2022 12:32 PM Inpatient General Surgery Consult Completed     8/1/2022  1:58 PM Inpatient Gastroenterology Consult Completed     8/1/2022  1:22 PM Inpatient Nephrology Consult      7/31/2022  7:27 PM LHCARLOS (on-call MD unless specified) Details              Discharge Exam:  Afebrile vital signs stable.  Well-developed well-nourished male no apparent distress.  Lungs clear to auscultation good air movement.  Heart regular rate and rhythm.  Abdomen with normal bowel sounds.  Nontender Zagami guarding masses.  Extremities no clubbing cyanosis or edema.  Tophaceous gout noted in the right wrist area.  Acute gouty changes on 3 of the MP joints of the right hand which are markedly improved from yesterday.  He is alert and oriented to person and place.  Cooperative and conversant.     Disposition:  Rehab    Patient Instructions:      Discharge Medications      New Medications      Instructions Start Date   acetaminophen 325 MG tablet  Commonly known as: TYLENOL   650 mg, Oral, Every 4 Hours PRN      cadexomer iodine 0.9 % gel  Commonly known as: IODOSORB   1 application, Topical, 2 Times Weekly   Start Date: August 15, 2022     famotidine 20 MG tablet  Commonly known as: PEPCID   20 mg, Oral, 2 Times Daily Before Meals      multivitamin tablet tablet   1 tablet, Oral, Daily   Start Date: August 14, 2022        Changes to Medications      Instructions Start Date   carvedilol 12.5 MG tablet  Commonly known as: COREG  What changed:   · medication strength  · how much to take  · when to take this   12.5 mg, Oral, 2 Times Daily With Meals      colchicine 0.6 MG tablet  What changed: when to  take this   0.6 mg, Oral, Daily   Start Date: August 14, 2022     furosemide 40 MG tablet  Commonly known as: LASIX  What changed: how much to take   20 mg, Oral, Daily      lactulose 10 GM/15ML solution  Commonly known as: CHRONULAC  What changed:   · how much to take  · when to take this   30 g, Oral, 3 Times Daily      oxyCODONE 10 MG tablet  Commonly known as: ROXICODONE  What changed:   · how to take this  · when to take this  · reasons to take this   10 mg, Oral, Every 6 Hours PRN      riFAXIMin 550 MG tablet  Commonly known as: XIFAXAN  What changed:   · how much to take  · how to take this  · when to take this   550 mg, Oral, Every 12 Hours Scheduled      spironolactone 25 MG tablet  Commonly known as: ALDACTONE  What changed: See the new instructions.   25 mg, Oral, Daily         Continue These Medications      Instructions Start Date   allopurinol 100 MG tablet  Commonly known as: ZYLOPRIM   Take 1 tablet by mouth twice daily      citalopram 20 MG tablet  Commonly known as: CeleXA   TAKE ONE TABLET BY MOUTH DAILY      folic acid 1 MG tablet  Commonly known as: FOLVITE   1 mg, Oral, Daily      thiamine 100 MG tablet  Commonly known as: VITAMIN B-1   100 mg, Oral, Daily      Transfer Bench misc   1 each, Does not apply, Daily, To use while showering.      zinc gluconate 50 MG tablet   No dose, route, or frequency recorded.         Stop These Medications    amantadine 100 MG tablet  Commonly known as: SYMMETREL     amLODIPine 5 MG tablet  Commonly known as: NORVASC     atorvastatin 80 MG tablet  Commonly known as: LIPITOR     Biofreeze Roll-On 4 % gel  Generic drug: Menthol (Topical Analgesic)     celecoxib 200 MG capsule  Commonly known as: CeleBREX     losartan 50 MG tablet  Commonly known as: COZAAR     meloxicam 15 MG tablet  Commonly known as: MOBIC     methylPREDNISolone 4 MG dose pack  Commonly known as: MEDROL     mupirocin 2 % ointment  Commonly known as: BACTROBAN     pantoprazole 40 MG EC  tablet  Commonly known as: PROTONIX            No future appointments.  Additional Instructions for the Follow-ups that You Need to Schedule     Discharge Follow-up with PCP   As directed       Currently Documented PCP:    Andrew Grayson APRN    PCP Phone Number:    [phone]     Follow Up Details: 1 week         Ammonia    Aug 17, 2022 (Approximate)      Release to patient: Routine Release         Comprehensive Metabolic Panel    Aug 17, 2022 (Approximate)      Release to patient: Routine Release            Contact information for follow-up providers     Andrew Grayson APRN .    Specialties: Family Medicine, Nurse Practitioner, Urgent Care  Why: 1 week  Contact information:  [address]  [address]  [facility] [address]  [phone]                   Contact information for after-discharge care     Destination     [facility] .    Service: Skilled Nursing  Contact information:  [address] [facility] [id number] [phone]                 Home Medical Care     [facility] .    Service: Home Health Services  Contact information:  [address]  [facility] [address] [phone]                           Discharge Order (From admission, onward)     Start     Ordered    08/13/22 1157  Discharge patient  Once        Expected Discharge Date: 08/13/22    Discharge Disposition: Rehab Facility or Unit (DC - External)    Physician of Record for Attribution - Please select from Treatment Team: MIKE DESHPANDE [[id number]]    Review needed by CMO to determine Physician of Record: No       Question Answer Comment   Physician of Record for Attribution - Please select from Treatment Team MIKE DESHPANDE    Review needed by CMO to determine Physician of Record No        08/13/22 1200                  Total time spent discharging patient including evaluation,post hospitalization follow up,  medication and post hospitalization instructions and  education total time exceeds 30 minutes.    Signed:  Bhanu Hernandez MD  8/13/2022  12:04 EDT    EMR Dragon/Transcription disclaimer:   Much of this encounter note is an electronic transcription/translation of spoken language to printed text. The electronic translation of spoken language may permit erroneous, or at times, nonsensical words or phrases to be inadvertently transcribed; Although I have reviewed the note for such errors, some may still exist.

## 2022-08-13 NOTE — NURSING NOTE
[facility] called twice, message left with voicemail to return call for report.

## 2022-08-13 NOTE — PLAN OF CARE
VSS. EMS to  patient at 2300 tonight for transport to [facility]. Several calls made without opportunity to pass on report. Messages left as well. PRN meds for pain, Q2 turns no BM's this shift. Will continue to monitor.

## 2022-08-13 NOTE — NURSING NOTE
2 additional calls placed to [facility] to give report. No answer. Message left on voicemail.

## 2022-08-13 NOTE — NURSING NOTE
"[facility] follow-up regards ETOH:    CIWA has been discontinued since 8/10/22. Pt denied current s/s withdrawal. Stated he hasn't had any cravings for ETOH for \"at least a week\".     Pt stated he feels \"very happy\" when asked regards depression or anxiety. Regards SI/HI/AVH, he replied, \"never\". He relayed his sleep and appetite are \"good\".     He stated he plans to maintain sobriety by \"hunting and fishing\". He believes this will be \"more than enough\" support. Nonetheless, pt shown the list of previous KIKE resources provided by [facility] and encouraged him to contact as soon as he is able. He is \"excited\" for rehab at SNF after discharge in hopes of \"gaining my strength back\".     [facility] will sign off.    "

## 2022-08-13 NOTE — CASE MANAGEMENT/SOCIAL WORK
Continued Stay Note  [facility]     Patient Name: Macario Carpio  MRN: [id number]  Today's Date: 8/13/2022    Admit Date: 7/31/2022     Discharge Plan     Row Name 08/13/22 1423       Plan    Plan [facility] SN[facility]    Plan Comments Nursing staff concerned about patient ability to transport via W/C van.  Vu notified, and cancelled 3:30 transport for today.  [facility] is able to transport today at 11PM.  Patient is agreeable to transport time. Spoke with Andreina with [facility], she will alert the facility.  EMS ambulance note added to EPIC. Will continue to monitor for new or changing discharge needs.  Franci SÁNCHEZ RN [facility]    Row Name 08/13/22 1306       Plan    Plan [facility] SNF    Plan Comments Vu scheduled to transport patient today to [facility].               Discharge Codes    No documentation.               Expected Discharge Date and Time     Expected Discharge Date Expected Discharge Time    Aug 13, 2022             Franci Martínez RN

## 2022-08-13 NOTE — CASE MANAGEMENT/SOCIAL WORK
Continued Stay Note  [facility]     Patient Name: Macario Carpio  MRN: [id number]  Today's Date: 8/13/2022    Admit Date: 7/31/2022     Discharge Plan     Row Name 08/13/22 1306       Plan    Plan [facility] SN[facility]    Plan Emily Womack scheduled to transport patient today to [facility].               Discharge Codes    No documentation.               Expected Discharge Date and Time     Expected Discharge Date Expected Discharge Time    Aug 13, 2022             Franci Martínez RN

## 2022-08-14 NOTE — CASE MANAGEMENT/SOCIAL WORK
Case Management Discharge Note      Final Note: DC'd to skilled bed at [facility] via [facility]         Selected Continued Care - Discharged on 8/14/2022 Admission date: 7/31/2022 - Discharge disposition: Rehab Facility or Unit (DC - External)    Destination Coordination complete.    Service Provider Selected Services Address Phone Fax Patient Preferred    [facility]  Skilled Nursing [address] [facility] [place] [address] [phone] --              Home Medical Care Coordination complete.    Service Provider Selected Services Address Phone Fax Patient Preferred    [facility]  Home Health Services [address] [address] [phone] --                   Transportation Services  Ambulance: [facility]    Final Discharge Disposition Code: 03 - skilled nursing facility (SNF)

## 2022-08-24 ENCOUNTER — APPOINTMENT (OUTPATIENT)
Dept: CT IMAGING | Facility: HOSPITAL | Age: 52
End: 2022-08-24

## 2022-08-24 ENCOUNTER — APPOINTMENT (OUTPATIENT)
Dept: GENERAL RADIOLOGY | Facility: HOSPITAL | Age: 52
End: 2022-08-24

## 2022-08-24 ENCOUNTER — HOSPITAL ENCOUNTER (OUTPATIENT)
Facility: HOSPITAL | Age: 52
Setting detail: OBSERVATION
Discharge: HOME HEALTH | End: 2022-08-29
Attending: EMERGENCY MEDICINE | Admitting: HOSPITALIST

## 2022-08-24 DIAGNOSIS — E83.42 HYPOMAGNESEMIA: ICD-10-CM

## 2022-08-24 DIAGNOSIS — D64.9 CHRONIC ANEMIA: ICD-10-CM

## 2022-08-24 DIAGNOSIS — Z87.19 HISTORY OF CIRRHOSIS OF LIVER: ICD-10-CM

## 2022-08-24 DIAGNOSIS — M1A.9XX0 CHRONIC GOUT, UNSPECIFIED CAUSE, UNSPECIFIED SITE: Primary | ICD-10-CM

## 2022-08-24 DIAGNOSIS — M1A.9XX1 TOPHACEOUS GOUT: ICD-10-CM

## 2022-08-24 DIAGNOSIS — R44.3 HALLUCINATIONS: ICD-10-CM

## 2022-08-24 LAB
ALBUMIN SERPL-MCNC: 3 G/DL (ref 3.5–5.2)
ALBUMIN/GLOB SERPL: 0.8 G/DL
ALP SERPL-CCNC: 143 U/L (ref 39–117)
ALT SERPL W P-5'-P-CCNC: 36 U/L (ref 1–41)
ANION GAP SERPL CALCULATED.3IONS-SCNC: 11.9 MMOL/L (ref 5–15)
AST SERPL-CCNC: 57 U/L (ref 1–40)
BACTERIA UR QL AUTO: ABNORMAL /HPF
BASOPHILS # BLD AUTO: 0.04 10*3/MM3 (ref 0–0.2)
BASOPHILS NFR BLD AUTO: 0.7 % (ref 0–1.5)
BILIRUB SERPL-MCNC: 0.7 MG/DL (ref 0–1.2)
BILIRUB UR QL STRIP: NEGATIVE
BUN SERPL-MCNC: 15 MG/DL (ref 6–20)
BUN/CREAT SERPL: 13.3 (ref 7–25)
CALCIUM SPEC-SCNC: 9.6 MG/DL (ref 8.6–10.5)
CHLORIDE SERPL-SCNC: 103 MMOL/L (ref 98–107)
CK SERPL-CCNC: 61 U/L (ref 20–200)
CLARITY UR: CLEAR
CO2 SERPL-SCNC: 22.1 MMOL/L (ref 22–29)
COLOR UR: YELLOW
CREAT SERPL-MCNC: 1.13 MG/DL (ref 0.76–1.27)
D-LACTATE SERPL-SCNC: 1.9 MMOL/L (ref 0.5–2)
DEPRECATED RDW RBC AUTO: 45.4 FL (ref 37–54)
EGFRCR SERPLBLD CKD-EPI 2021: 78.7 ML/MIN/1.73
EOSINOPHIL # BLD AUTO: 0.1 10*3/MM3 (ref 0–0.4)
EOSINOPHIL NFR BLD AUTO: 1.8 % (ref 0.3–6.2)
ERYTHROCYTE [DISTWIDTH] IN BLOOD BY AUTOMATED COUNT: 13.7 % (ref 12.3–15.4)
ETHANOL BLD-MCNC: <10 MG/DL (ref 0–10)
ETHANOL UR QL: <0.01 %
GLOBULIN UR ELPH-MCNC: 4 GM/DL
GLUCOSE SERPL-MCNC: 107 MG/DL (ref 65–99)
GLUCOSE UR STRIP-MCNC: NEGATIVE MG/DL
HCT VFR BLD AUTO: 29.6 % (ref 37.5–51)
HGB BLD-MCNC: 9.9 G/DL (ref 13–17.7)
HGB UR QL STRIP.AUTO: NEGATIVE
HYALINE CASTS UR QL AUTO: ABNORMAL /LPF
IMM GRANULOCYTES # BLD AUTO: 0.01 10*3/MM3 (ref 0–0.05)
IMM GRANULOCYTES NFR BLD AUTO: 0.2 % (ref 0–0.5)
KETONES UR QL STRIP: NEGATIVE
LEUKOCYTE ESTERASE UR QL STRIP.AUTO: ABNORMAL
LYMPHOCYTES # BLD AUTO: 0.92 10*3/MM3 (ref 0.7–3.1)
LYMPHOCYTES NFR BLD AUTO: 16.7 % (ref 19.6–45.3)
MAGNESIUM SERPL-MCNC: 1.1 MG/DL (ref 1.6–2.6)
MCH RBC QN AUTO: 31 PG (ref 26.6–33)
MCHC RBC AUTO-ENTMCNC: 33.4 G/DL (ref 31.5–35.7)
MCV RBC AUTO: 92.8 FL (ref 79–97)
MONOCYTES # BLD AUTO: 0.42 10*3/MM3 (ref 0.1–0.9)
MONOCYTES NFR BLD AUTO: 7.6 % (ref 5–12)
NEUTROPHILS NFR BLD AUTO: 4.03 10*3/MM3 (ref 1.7–7)
NEUTROPHILS NFR BLD AUTO: 73 % (ref 42.7–76)
NITRITE UR QL STRIP: NEGATIVE
NRBC BLD AUTO-RTO: 0 /100 WBC (ref 0–0.2)
PH UR STRIP.AUTO: 6 [PH] (ref 5–8)
PHOSPHATE SERPL-MCNC: 3.9 MG/DL (ref 2.5–4.5)
PLATELET # BLD AUTO: 168 10*3/MM3 (ref 140–450)
PMV BLD AUTO: 11.2 FL (ref 6–12)
POTASSIUM SERPL-SCNC: 4.3 MMOL/L (ref 3.5–5.2)
PROCALCITONIN SERPL-MCNC: <0.02 NG/ML (ref 0–0.25)
PROT SERPL-MCNC: 7 G/DL (ref 6–8.5)
PROT UR QL STRIP: NEGATIVE
RBC # BLD AUTO: 3.19 10*6/MM3 (ref 4.14–5.8)
RBC # UR STRIP: ABNORMAL /HPF
REF LAB TEST METHOD: ABNORMAL
SARS-COV-2 RNA PNL SPEC NAA+PROBE: NOT DETECTED
SODIUM SERPL-SCNC: 137 MMOL/L (ref 136–145)
SP GR UR STRIP: 1.01 (ref 1–1.03)
SQUAMOUS #/AREA URNS HPF: ABNORMAL /HPF
TROPONIN T SERPL-MCNC: <0.01 NG/ML (ref 0–0.03)
URATE SERPL-MCNC: 7.6 MG/DL (ref 3.4–7)
UROBILINOGEN UR QL STRIP: ABNORMAL
WBC # UR STRIP: ABNORMAL /HPF
WBC NRBC COR # BLD: 5.52 10*3/MM3 (ref 3.4–10.8)

## 2022-08-24 PROCEDURE — 87040 BLOOD CULTURE FOR BACTERIA: CPT | Performed by: EMERGENCY MEDICINE

## 2022-08-24 PROCEDURE — 84550 ASSAY OF BLOOD/URIC ACID: CPT | Performed by: EMERGENCY MEDICINE

## 2022-08-24 PROCEDURE — 82077 ASSAY SPEC XCP UR&BREATH IA: CPT | Performed by: EMERGENCY MEDICINE

## 2022-08-24 PROCEDURE — 84100 ASSAY OF PHOSPHORUS: CPT | Performed by: EMERGENCY MEDICINE

## 2022-08-24 PROCEDURE — G0378 HOSPITAL OBSERVATION PER HR: HCPCS

## 2022-08-24 PROCEDURE — 25010000002 MAGNESIUM SULFATE 2 GM/50ML SOLUTION: Performed by: EMERGENCY MEDICINE

## 2022-08-24 PROCEDURE — 80053 COMPREHEN METABOLIC PANEL: CPT | Performed by: EMERGENCY MEDICINE

## 2022-08-24 PROCEDURE — 70450 CT HEAD/BRAIN W/O DYE: CPT

## 2022-08-24 PROCEDURE — 96375 TX/PRO/DX INJ NEW DRUG ADDON: CPT

## 2022-08-24 PROCEDURE — 83605 ASSAY OF LACTIC ACID: CPT | Performed by: EMERGENCY MEDICINE

## 2022-08-24 PROCEDURE — 87635 SARS-COV-2 COVID-19 AMP PRB: CPT | Performed by: EMERGENCY MEDICINE

## 2022-08-24 PROCEDURE — 82550 ASSAY OF CK (CPK): CPT | Performed by: EMERGENCY MEDICINE

## 2022-08-24 PROCEDURE — 96366 THER/PROPH/DIAG IV INF ADDON: CPT

## 2022-08-24 PROCEDURE — 96365 THER/PROPH/DIAG IV INF INIT: CPT

## 2022-08-24 PROCEDURE — 84484 ASSAY OF TROPONIN QUANT: CPT | Performed by: EMERGENCY MEDICINE

## 2022-08-24 PROCEDURE — 85025 COMPLETE CBC W/AUTO DIFF WBC: CPT | Performed by: EMERGENCY MEDICINE

## 2022-08-24 PROCEDURE — 83735 ASSAY OF MAGNESIUM: CPT | Performed by: EMERGENCY MEDICINE

## 2022-08-24 PROCEDURE — 84145 PROCALCITONIN (PCT): CPT | Performed by: EMERGENCY MEDICINE

## 2022-08-24 PROCEDURE — 99285 EMERGENCY DEPT VISIT HI MDM: CPT

## 2022-08-24 PROCEDURE — 71045 X-RAY EXAM CHEST 1 VIEW: CPT | Performed by: EMERGENCY MEDICINE

## 2022-08-24 PROCEDURE — 25010000002 METHYLPREDNISOLONE PER 40 MG: Performed by: EMERGENCY MEDICINE

## 2022-08-24 PROCEDURE — 81001 URINALYSIS AUTO W/SCOPE: CPT | Performed by: EMERGENCY MEDICINE

## 2022-08-24 RX ORDER — SODIUM CHLORIDE 9 MG/ML
100 INJECTION, SOLUTION INTRAVENOUS CONTINUOUS
Status: DISCONTINUED | OUTPATIENT
Start: 2022-08-24 | End: 2022-08-26

## 2022-08-24 RX ORDER — SODIUM CHLORIDE 0.9 % (FLUSH) 0.9 %
10 SYRINGE (ML) INJECTION AS NEEDED
Status: DISCONTINUED | OUTPATIENT
Start: 2022-08-24 | End: 2022-08-29 | Stop reason: HOSPADM

## 2022-08-24 RX ORDER — NITROGLYCERIN 0.4 MG/1
0.4 TABLET SUBLINGUAL
Status: DISCONTINUED | OUTPATIENT
Start: 2022-08-24 | End: 2022-08-29 | Stop reason: HOSPADM

## 2022-08-24 RX ORDER — SODIUM CHLORIDE 0.9 % (FLUSH) 0.9 %
10 SYRINGE (ML) INJECTION EVERY 12 HOURS SCHEDULED
Status: DISCONTINUED | OUTPATIENT
Start: 2022-08-24 | End: 2022-08-29 | Stop reason: HOSPADM

## 2022-08-24 RX ORDER — ACETAMINOPHEN 160 MG/5ML
650 SOLUTION ORAL EVERY 4 HOURS PRN
Status: DISCONTINUED | OUTPATIENT
Start: 2022-08-24 | End: 2022-08-29 | Stop reason: HOSPADM

## 2022-08-24 RX ORDER — ONDANSETRON 2 MG/ML
4 INJECTION INTRAMUSCULAR; INTRAVENOUS EVERY 6 HOURS PRN
Status: DISCONTINUED | OUTPATIENT
Start: 2022-08-24 | End: 2022-08-29 | Stop reason: HOSPADM

## 2022-08-24 RX ORDER — MAGNESIUM SULFATE HEPTAHYDRATE 40 MG/ML
2 INJECTION, SOLUTION INTRAVENOUS ONCE
Status: COMPLETED | OUTPATIENT
Start: 2022-08-24 | End: 2022-08-25

## 2022-08-24 RX ORDER — MAGNESIUM SULFATE HEPTAHYDRATE 40 MG/ML
2 INJECTION, SOLUTION INTRAVENOUS AS NEEDED
Status: DISCONTINUED | OUTPATIENT
Start: 2022-08-24 | End: 2022-08-29 | Stop reason: HOSPADM

## 2022-08-24 RX ORDER — METHYLPREDNISOLONE SODIUM SUCCINATE 40 MG/ML
20 INJECTION, POWDER, LYOPHILIZED, FOR SOLUTION INTRAMUSCULAR; INTRAVENOUS ONCE
Status: COMPLETED | OUTPATIENT
Start: 2022-08-24 | End: 2022-08-24

## 2022-08-24 RX ORDER — OXYCODONE HYDROCHLORIDE 5 MG/1
10 TABLET ORAL ONCE
Status: COMPLETED | OUTPATIENT
Start: 2022-08-24 | End: 2022-08-24

## 2022-08-24 RX ORDER — ACETAMINOPHEN 650 MG/1
650 SUPPOSITORY RECTAL EVERY 4 HOURS PRN
Status: DISCONTINUED | OUTPATIENT
Start: 2022-08-24 | End: 2022-08-29 | Stop reason: HOSPADM

## 2022-08-24 RX ORDER — MAGNESIUM SULFATE HEPTAHYDRATE 40 MG/ML
4 INJECTION, SOLUTION INTRAVENOUS AS NEEDED
Status: DISCONTINUED | OUTPATIENT
Start: 2022-08-24 | End: 2022-08-29 | Stop reason: HOSPADM

## 2022-08-24 RX ORDER — ACETAMINOPHEN 325 MG/1
650 TABLET ORAL EVERY 4 HOURS PRN
Status: DISCONTINUED | OUTPATIENT
Start: 2022-08-24 | End: 2022-08-29 | Stop reason: HOSPADM

## 2022-08-24 RX ADMIN — SODIUM CHLORIDE, POTASSIUM CHLORIDE, SODIUM LACTATE AND CALCIUM CHLORIDE 1000 ML: 600; 310; 30; 20 INJECTION, SOLUTION INTRAVENOUS at 19:00

## 2022-08-24 RX ADMIN — MAGNESIUM SULFATE HEPTAHYDRATE 2 G: 40 INJECTION, SOLUTION INTRAVENOUS at 21:11

## 2022-08-24 RX ADMIN — OXYCODONE 10 MG: 5 TABLET ORAL at 20:39

## 2022-08-24 RX ADMIN — METHYLPREDNISOLONE SODIUM SUCCINATE 20 MG: 40 INJECTION, POWDER, FOR SOLUTION INTRAMUSCULAR; INTRAVENOUS at 21:13

## 2022-08-25 ENCOUNTER — APPOINTMENT (OUTPATIENT)
Dept: GENERAL RADIOLOGY | Facility: HOSPITAL | Age: 52
End: 2022-08-25

## 2022-08-25 ENCOUNTER — APPOINTMENT (OUTPATIENT)
Dept: CT IMAGING | Facility: HOSPITAL | Age: 52
End: 2022-08-25

## 2022-08-25 PROBLEM — G93.41 METABOLIC ENCEPHALOPATHY: Status: ACTIVE | Noted: 2022-07-31

## 2022-08-25 PROBLEM — F23 BRIEF PSYCHOTIC DISORDER: Status: ACTIVE | Noted: 2022-08-25

## 2022-08-25 LAB
AMMONIA BLD-SCNC: 35 UMOL/L (ref 16–60)
ANION GAP SERPL CALCULATED.3IONS-SCNC: 11.1 MMOL/L (ref 5–15)
BUN SERPL-MCNC: 17 MG/DL (ref 6–20)
BUN/CREAT SERPL: 16.7 (ref 7–25)
CALCIUM SPEC-SCNC: 8.9 MG/DL (ref 8.6–10.5)
CHLORIDE SERPL-SCNC: 102 MMOL/L (ref 98–107)
CO2 SERPL-SCNC: 18.9 MMOL/L (ref 22–29)
CREAT SERPL-MCNC: 1.02 MG/DL (ref 0.76–1.27)
EGFRCR SERPLBLD CKD-EPI 2021: 89 ML/MIN/1.73
GLUCOSE SERPL-MCNC: 215 MG/DL (ref 65–99)
IRON 24H UR-MRATE: 26 MCG/DL (ref 59–158)
IRON SATN MFR SERPL: 11 % (ref 20–50)
MAGNESIUM SERPL-MCNC: 1.5 MG/DL (ref 1.6–2.6)
POTASSIUM SERPL-SCNC: 4.4 MMOL/L (ref 3.5–5.2)
QT INTERVAL: 408 MS
SODIUM SERPL-SCNC: 132 MMOL/L (ref 136–145)
TIBC SERPL-MCNC: 234 MCG/DL (ref 298–536)
TRANSFERRIN SERPL-MCNC: 157 MG/DL (ref 200–360)
TROPONIN T SERPL-MCNC: <0.01 NG/ML (ref 0–0.03)

## 2022-08-25 PROCEDURE — 83540 ASSAY OF IRON: CPT | Performed by: NURSE PRACTITIONER

## 2022-08-25 PROCEDURE — 36415 COLL VENOUS BLD VENIPUNCTURE: CPT | Performed by: EMERGENCY MEDICINE

## 2022-08-25 PROCEDURE — 84484 ASSAY OF TROPONIN QUANT: CPT | Performed by: HOSPITALIST

## 2022-08-25 PROCEDURE — 83735 ASSAY OF MAGNESIUM: CPT | Performed by: NURSE PRACTITIONER

## 2022-08-25 PROCEDURE — 97535 SELF CARE MNGMENT TRAINING: CPT

## 2022-08-25 PROCEDURE — 97166 OT EVAL MOD COMPLEX 45 MIN: CPT

## 2022-08-25 PROCEDURE — 99204 OFFICE O/P NEW MOD 45 MIN: CPT | Performed by: PSYCHIATRY & NEUROLOGY

## 2022-08-25 PROCEDURE — 25010000002 MAGNESIUM SULFATE 2 GM/50ML SOLUTION: Performed by: NURSE PRACTITIONER

## 2022-08-25 PROCEDURE — 99204 OFFICE O/P NEW MOD 45 MIN: CPT | Performed by: ORTHOPAEDIC SURGERY

## 2022-08-25 PROCEDURE — G0378 HOSPITAL OBSERVATION PER HR: HCPCS

## 2022-08-25 PROCEDURE — 96366 THER/PROPH/DIAG IV INF ADDON: CPT

## 2022-08-25 PROCEDURE — 99204 OFFICE O/P NEW MOD 45 MIN: CPT | Performed by: INTERNAL MEDICINE

## 2022-08-25 PROCEDURE — 73070 X-RAY EXAM OF ELBOW: CPT

## 2022-08-25 PROCEDURE — 82140 ASSAY OF AMMONIA: CPT | Performed by: EMERGENCY MEDICINE

## 2022-08-25 PROCEDURE — 96361 HYDRATE IV INFUSION ADD-ON: CPT

## 2022-08-25 PROCEDURE — 84466 ASSAY OF TRANSFERRIN: CPT | Performed by: NURSE PRACTITIONER

## 2022-08-25 PROCEDURE — 97162 PT EVAL MOD COMPLEX 30 MIN: CPT

## 2022-08-25 PROCEDURE — 73120 X-RAY EXAM OF HAND: CPT

## 2022-08-25 PROCEDURE — 97530 THERAPEUTIC ACTIVITIES: CPT

## 2022-08-25 PROCEDURE — 93005 ELECTROCARDIOGRAM TRACING: CPT | Performed by: HOSPITALIST

## 2022-08-25 PROCEDURE — 80048 BASIC METABOLIC PNL TOTAL CA: CPT | Performed by: HOSPITALIST

## 2022-08-25 PROCEDURE — 93010 ELECTROCARDIOGRAM REPORT: CPT | Performed by: INTERNAL MEDICINE

## 2022-08-25 PROCEDURE — 71250 CT THORAX DX C-: CPT

## 2022-08-25 PROCEDURE — 87102 FUNGUS ISOLATION CULTURE: CPT | Performed by: NURSE PRACTITIONER

## 2022-08-25 RX ORDER — COLCHICINE 0.6 MG/1
0.6 TABLET ORAL DAILY
Status: DISCONTINUED | OUTPATIENT
Start: 2022-08-25 | End: 2022-08-29 | Stop reason: HOSPADM

## 2022-08-25 RX ORDER — FAMOTIDINE 20 MG/1
20 TABLET, FILM COATED ORAL
Status: DISCONTINUED | OUTPATIENT
Start: 2022-08-25 | End: 2022-08-26

## 2022-08-25 RX ORDER — MELOXICAM 7.5 MG/1
7.5 TABLET ORAL DAILY
Status: DISCONTINUED | OUTPATIENT
Start: 2022-08-25 | End: 2022-08-26

## 2022-08-25 RX ORDER — OLANZAPINE 5 MG/1
5 TABLET ORAL
Status: DISCONTINUED | OUTPATIENT
Start: 2022-08-25 | End: 2022-08-27

## 2022-08-25 RX ORDER — MELOXICAM 7.5 MG/1
7.5 TABLET ORAL DAILY
COMMUNITY
End: 2022-09-16 | Stop reason: SDUPTHER

## 2022-08-25 RX ORDER — OLANZAPINE 10 MG/1
5 INJECTION, POWDER, LYOPHILIZED, FOR SOLUTION INTRAMUSCULAR EVERY 4 HOURS PRN
Status: DISCONTINUED | OUTPATIENT
Start: 2022-08-25 | End: 2022-08-29 | Stop reason: HOSPADM

## 2022-08-25 RX ORDER — FOLIC ACID 1 MG/1
1 TABLET ORAL DAILY
Status: DISCONTINUED | OUTPATIENT
Start: 2022-08-25 | End: 2022-08-29 | Stop reason: HOSPADM

## 2022-08-25 RX ORDER — ALLOPURINOL 100 MG/1
100 TABLET ORAL DAILY
Status: DISCONTINUED | OUTPATIENT
Start: 2022-08-25 | End: 2022-08-29 | Stop reason: HOSPADM

## 2022-08-25 RX ORDER — FERROUS SULFATE 325(65) MG
325 TABLET ORAL
Status: DISCONTINUED | OUTPATIENT
Start: 2022-08-25 | End: 2022-08-29 | Stop reason: HOSPADM

## 2022-08-25 RX ORDER — FUROSEMIDE 20 MG/1
20 TABLET ORAL DAILY
Status: DISCONTINUED | OUTPATIENT
Start: 2022-08-25 | End: 2022-08-29 | Stop reason: HOSPADM

## 2022-08-25 RX ORDER — OXYCODONE HYDROCHLORIDE 5 MG/1
10 TABLET ORAL EVERY 6 HOURS PRN
Status: DISCONTINUED | OUTPATIENT
Start: 2022-08-25 | End: 2022-08-29 | Stop reason: HOSPADM

## 2022-08-25 RX ORDER — LACTULOSE 10 G/15ML
30 SOLUTION ORAL 3 TIMES DAILY
Status: DISCONTINUED | OUTPATIENT
Start: 2022-08-25 | End: 2022-08-29 | Stop reason: HOSPADM

## 2022-08-25 RX ORDER — CITALOPRAM 20 MG/1
20 TABLET ORAL DAILY
Status: DISCONTINUED | OUTPATIENT
Start: 2022-08-25 | End: 2022-08-29 | Stop reason: HOSPADM

## 2022-08-25 RX ORDER — SPIRONOLACTONE 25 MG/1
25 TABLET ORAL DAILY
Status: DISCONTINUED | OUTPATIENT
Start: 2022-08-25 | End: 2022-08-29 | Stop reason: HOSPADM

## 2022-08-25 RX ORDER — CARVEDILOL 12.5 MG/1
12.5 TABLET ORAL 2 TIMES DAILY WITH MEALS
Status: DISCONTINUED | OUTPATIENT
Start: 2022-08-25 | End: 2022-08-25

## 2022-08-25 RX ADMIN — Medication 1 APPLICATION: at 11:25

## 2022-08-25 RX ADMIN — SODIUM CHLORIDE 100 ML/HR: 9 INJECTION, SOLUTION INTRAVENOUS at 11:21

## 2022-08-25 RX ADMIN — FAMOTIDINE 20 MG: 20 TABLET ORAL at 06:09

## 2022-08-25 RX ADMIN — Medication 10 ML: at 00:34

## 2022-08-25 RX ADMIN — RIFAXIMIN 550 MG: 550 TABLET ORAL at 11:20

## 2022-08-25 RX ADMIN — Medication 100 MG: at 19:17

## 2022-08-25 RX ADMIN — FERROUS SULFATE TAB 325 MG (65 MG ELEMENTAL FE) 325 MG: 325 (65 FE) TAB at 11:20

## 2022-08-25 RX ADMIN — MELOXICAM 7.5 MG: 7.5 TABLET ORAL at 15:42

## 2022-08-25 RX ADMIN — MAGNESIUM SULFATE HEPTAHYDRATE 2 G: 2 INJECTION, SOLUTION INTRAVENOUS at 11:21

## 2022-08-25 RX ADMIN — SODIUM CHLORIDE 100 ML/HR: 9 INJECTION, SOLUTION INTRAVENOUS at 22:14

## 2022-08-25 RX ADMIN — Medication 10 ML: at 20:04

## 2022-08-25 RX ADMIN — OLANZAPINE 5 MG: 5 TABLET ORAL at 16:44

## 2022-08-25 RX ADMIN — FAMOTIDINE 20 MG: 20 TABLET ORAL at 16:44

## 2022-08-25 RX ADMIN — OXYCODONE 10 MG: 5 TABLET ORAL at 20:04

## 2022-08-25 RX ADMIN — ALLOPURINOL 100 MG: 100 TABLET ORAL at 14:13

## 2022-08-25 RX ADMIN — RIFAXIMIN 550 MG: 550 TABLET ORAL at 20:04

## 2022-08-25 RX ADMIN — OXYCODONE 10 MG: 5 TABLET ORAL at 13:37

## 2022-08-25 RX ADMIN — COLCHICINE 0.6 MG: 0.6 TABLET, FILM COATED ORAL at 08:35

## 2022-08-25 RX ADMIN — MAGNESIUM SULFATE HEPTAHYDRATE 2 G: 2 INJECTION, SOLUTION INTRAVENOUS at 08:32

## 2022-08-25 RX ADMIN — SODIUM CHLORIDE 100 ML/HR: 9 INJECTION, SOLUTION INTRAVENOUS at 00:35

## 2022-08-25 RX ADMIN — SPIRONOLACTONE 25 MG: 25 TABLET ORAL at 08:35

## 2022-08-25 RX ADMIN — LACTULOSE 30 G: 10 SOLUTION ORAL at 16:44

## 2022-08-25 RX ADMIN — FUROSEMIDE 20 MG: 20 TABLET ORAL at 08:35

## 2022-08-25 RX ADMIN — CITALOPRAM 20 MG: 20 TABLET, FILM COATED ORAL at 14:13

## 2022-08-25 RX ADMIN — Medication 10 ML: at 08:36

## 2022-08-25 RX ADMIN — OXYCODONE 10 MG: 5 TABLET ORAL at 06:47

## 2022-08-25 RX ADMIN — Medication 1 MG: at 14:13

## 2022-08-25 RX ADMIN — MAGNESIUM SULFATE HEPTAHYDRATE 2 G: 2 INJECTION, SOLUTION INTRAVENOUS at 06:08

## 2022-08-25 RX ADMIN — OXYCODONE 10 MG: 5 TABLET ORAL at 00:51

## 2022-08-25 RX ADMIN — LACTULOSE 30 G: 10 SOLUTION ORAL at 08:34

## 2022-08-26 ENCOUNTER — TELEPHONE (OUTPATIENT)
Dept: FAMILY MEDICINE CLINIC | Facility: CLINIC | Age: 52
End: 2022-08-26

## 2022-08-26 LAB
ALBUMIN SERPL-MCNC: 2.6 G/DL (ref 3.5–5.2)
ALBUMIN/GLOB SERPL: 0.8 G/DL
ALP SERPL-CCNC: 116 U/L (ref 39–117)
ALT SERPL W P-5'-P-CCNC: 32 U/L (ref 1–41)
ANION GAP SERPL CALCULATED.3IONS-SCNC: 8.7 MMOL/L (ref 5–15)
AST SERPL-CCNC: 51 U/L (ref 1–40)
BASOPHILS # BLD AUTO: 0.02 10*3/MM3 (ref 0–0.2)
BASOPHILS NFR BLD AUTO: 0.5 % (ref 0–1.5)
BILIRUB SERPL-MCNC: 0.4 MG/DL (ref 0–1.2)
BUN SERPL-MCNC: 15 MG/DL (ref 6–20)
BUN/CREAT SERPL: 17 (ref 7–25)
CALCIUM SPEC-SCNC: 9 MG/DL (ref 8.6–10.5)
CHLORIDE SERPL-SCNC: 111 MMOL/L (ref 98–107)
CO2 SERPL-SCNC: 23.3 MMOL/L (ref 22–29)
CREAT SERPL-MCNC: 0.88 MG/DL (ref 0.76–1.27)
DEPRECATED RDW RBC AUTO: 46.3 FL (ref 37–54)
EGFRCR SERPLBLD CKD-EPI 2021: 104.1 ML/MIN/1.73
EOSINOPHIL # BLD AUTO: 0.06 10*3/MM3 (ref 0–0.4)
EOSINOPHIL NFR BLD AUTO: 1.6 % (ref 0.3–6.2)
ERYTHROCYTE [DISTWIDTH] IN BLOOD BY AUTOMATED COUNT: 13.7 % (ref 12.3–15.4)
GLOBULIN UR ELPH-MCNC: 3.2 GM/DL
GLUCOSE SERPL-MCNC: 111 MG/DL (ref 65–99)
HCT VFR BLD AUTO: 25.3 % (ref 37.5–51)
HGB BLD-MCNC: 8.3 G/DL (ref 13–17.7)
LYMPHOCYTES # BLD AUTO: 0.97 10*3/MM3 (ref 0.7–3.1)
LYMPHOCYTES NFR BLD AUTO: 26.6 % (ref 19.6–45.3)
MAGNESIUM SERPL-MCNC: 1.7 MG/DL (ref 1.6–2.6)
MCH RBC QN AUTO: 31 PG (ref 26.6–33)
MCHC RBC AUTO-ENTMCNC: 32.8 G/DL (ref 31.5–35.7)
MCV RBC AUTO: 94.4 FL (ref 79–97)
MONOCYTES # BLD AUTO: 0.33 10*3/MM3 (ref 0.1–0.9)
MONOCYTES NFR BLD AUTO: 9.1 % (ref 5–12)
NEUTROPHILS NFR BLD AUTO: 2.25 10*3/MM3 (ref 1.7–7)
NEUTROPHILS NFR BLD AUTO: 61.9 % (ref 42.7–76)
PLATELET # BLD AUTO: 134 10*3/MM3 (ref 140–450)
PMV BLD AUTO: 11.5 FL (ref 6–12)
POTASSIUM SERPL-SCNC: 4 MMOL/L (ref 3.5–5.2)
PROT SERPL-MCNC: 5.8 G/DL (ref 6–8.5)
RBC # BLD AUTO: 2.68 10*6/MM3 (ref 4.14–5.8)
SODIUM SERPL-SCNC: 143 MMOL/L (ref 136–145)
WBC NRBC COR # BLD: 3.64 10*3/MM3 (ref 3.4–10.8)

## 2022-08-26 PROCEDURE — 97530 THERAPEUTIC ACTIVITIES: CPT

## 2022-08-26 PROCEDURE — 83735 ASSAY OF MAGNESIUM: CPT | Performed by: HOSPITALIST

## 2022-08-26 PROCEDURE — 99214 OFFICE O/P EST MOD 30 MIN: CPT | Performed by: NURSE PRACTITIONER

## 2022-08-26 PROCEDURE — 80053 COMPREHEN METABOLIC PANEL: CPT | Performed by: HOSPITALIST

## 2022-08-26 PROCEDURE — G0378 HOSPITAL OBSERVATION PER HR: HCPCS

## 2022-08-26 PROCEDURE — 97110 THERAPEUTIC EXERCISES: CPT

## 2022-08-26 PROCEDURE — 0 MAGNESIUM SULFATE 4 GM/100ML SOLUTION: Performed by: NURSE PRACTITIONER

## 2022-08-26 PROCEDURE — 85025 COMPLETE CBC W/AUTO DIFF WBC: CPT | Performed by: HOSPITALIST

## 2022-08-26 PROCEDURE — 99213 OFFICE O/P EST LOW 20 MIN: CPT | Performed by: PSYCHIATRY & NEUROLOGY

## 2022-08-26 RX ORDER — PANTOPRAZOLE SODIUM 40 MG/1
40 TABLET, DELAYED RELEASE ORAL
Status: DISCONTINUED | OUTPATIENT
Start: 2022-08-26 | End: 2022-08-29 | Stop reason: HOSPADM

## 2022-08-26 RX ADMIN — FAMOTIDINE 20 MG: 20 TABLET ORAL at 05:55

## 2022-08-26 RX ADMIN — RIFAXIMIN 550 MG: 550 TABLET ORAL at 08:30

## 2022-08-26 RX ADMIN — OXYCODONE 10 MG: 5 TABLET ORAL at 16:49

## 2022-08-26 RX ADMIN — LACTULOSE 30 G: 10 SOLUTION ORAL at 20:53

## 2022-08-26 RX ADMIN — LACTULOSE 30 G: 10 SOLUTION ORAL at 16:48

## 2022-08-26 RX ADMIN — RIFAXIMIN 550 MG: 550 TABLET ORAL at 20:53

## 2022-08-26 RX ADMIN — ALLOPURINOL 100 MG: 100 TABLET ORAL at 08:30

## 2022-08-26 RX ADMIN — FERROUS SULFATE TAB 325 MG (65 MG ELEMENTAL FE) 325 MG: 325 (65 FE) TAB at 08:30

## 2022-08-26 RX ADMIN — Medication 1 MG: at 08:30

## 2022-08-26 RX ADMIN — OXYCODONE 10 MG: 5 TABLET ORAL at 08:35

## 2022-08-26 RX ADMIN — FUROSEMIDE 20 MG: 20 TABLET ORAL at 08:30

## 2022-08-26 RX ADMIN — Medication 10 ML: at 08:30

## 2022-08-26 RX ADMIN — Medication 100 MG: at 08:30

## 2022-08-26 RX ADMIN — OLANZAPINE 5 MG: 5 TABLET ORAL at 16:49

## 2022-08-26 RX ADMIN — SPIRONOLACTONE 25 MG: 25 TABLET ORAL at 08:30

## 2022-08-26 RX ADMIN — OXYCODONE 10 MG: 5 TABLET ORAL at 02:22

## 2022-08-26 RX ADMIN — LACTULOSE 30 G: 10 SOLUTION ORAL at 08:30

## 2022-08-26 RX ADMIN — PANTOPRAZOLE SODIUM 40 MG: 40 TABLET, DELAYED RELEASE ORAL at 16:49

## 2022-08-26 RX ADMIN — MELOXICAM 7.5 MG: 7.5 TABLET ORAL at 08:30

## 2022-08-26 RX ADMIN — Medication 10 ML: at 20:53

## 2022-08-26 RX ADMIN — MAGNESIUM SULFATE HEPTAHYDRATE 4 G: 40 INJECTION, SOLUTION INTRAVENOUS at 05:56

## 2022-08-26 RX ADMIN — OXYCODONE 10 MG: 5 TABLET ORAL at 23:14

## 2022-08-26 RX ADMIN — CITALOPRAM 20 MG: 20 TABLET, FILM COATED ORAL at 08:30

## 2022-08-26 RX ADMIN — COLCHICINE 0.6 MG: 0.6 TABLET, FILM COATED ORAL at 08:30

## 2022-08-26 NOTE — TELEPHONE ENCOUNTER
Please have her get home health orders from the inpatient physician.  We can take over the patient's orders when he is seen in the office.

## 2022-08-26 NOTE — TELEPHONE ENCOUNTER
Pt is currently admitted and maybe released this weekend would like orders to continue home health care when discharged,

## 2022-08-26 NOTE — TELEPHONE ENCOUNTER
Blossom with [facility] called again requesting that you reconsidering allowing them to continue care for the patient.  They are stating that the reason he did not come in after his last hospital stay is that pt was in rehab.  He will be going home after his hospital stay this time because he was due to be released from rehab before he went inpatient.  Informed Blossom I would ask.  She is stating that they were following him for his foot, she believes.  She is also stated that if they hospital put in orders then if he needed something they would not sign for it.  Please advise, pt still has not scheduled a hospital follow up as of now.

## 2022-08-26 NOTE — TELEPHONE ENCOUNTER
Patient has not been seen by anyone in this office since May.  We cannot sign off on orders until the patient is seen.  The home health should be able to take orders from the hospital until the patient is seen in the clinic.  Please have the patient schedule a hospital follow-up with one of the providers in the office

## 2022-08-27 LAB
ALBUMIN SERPL-MCNC: 2.6 G/DL (ref 3.5–5.2)
ALBUMIN/GLOB SERPL: 0.8 G/DL
ALP SERPL-CCNC: 128 U/L (ref 39–117)
ALT SERPL W P-5'-P-CCNC: 47 U/L (ref 1–41)
ANION GAP SERPL CALCULATED.3IONS-SCNC: 7.5 MMOL/L (ref 5–15)
AST SERPL-CCNC: 99 U/L (ref 1–40)
BASOPHILS # BLD AUTO: 0.03 10*3/MM3 (ref 0–0.2)
BASOPHILS NFR BLD AUTO: 0.8 % (ref 0–1.5)
BILIRUB SERPL-MCNC: 0.4 MG/DL (ref 0–1.2)
BUN SERPL-MCNC: 14 MG/DL (ref 6–20)
BUN/CREAT SERPL: 15.7 (ref 7–25)
CALCIUM SPEC-SCNC: 9 MG/DL (ref 8.6–10.5)
CHLORIDE SERPL-SCNC: 110 MMOL/L (ref 98–107)
CO2 SERPL-SCNC: 22.5 MMOL/L (ref 22–29)
CREAT SERPL-MCNC: 0.89 MG/DL (ref 0.76–1.27)
DEPRECATED RDW RBC AUTO: 47.9 FL (ref 37–54)
EGFRCR SERPLBLD CKD-EPI 2021: 103.8 ML/MIN/1.73
EOSINOPHIL # BLD AUTO: 0.15 10*3/MM3 (ref 0–0.4)
EOSINOPHIL NFR BLD AUTO: 3.8 % (ref 0.3–6.2)
ERYTHROCYTE [DISTWIDTH] IN BLOOD BY AUTOMATED COUNT: 13.8 % (ref 12.3–15.4)
FERRITIN SERPL-MCNC: 365 NG/ML (ref 30–400)
GLOBULIN UR ELPH-MCNC: 3.2 GM/DL
GLUCOSE SERPL-MCNC: 83 MG/DL (ref 65–99)
HCT VFR BLD AUTO: 27.9 % (ref 37.5–51)
HGB BLD-MCNC: 9 G/DL (ref 13–17.7)
IMM GRANULOCYTES # BLD AUTO: 0.02 10*3/MM3 (ref 0–0.05)
IMM GRANULOCYTES NFR BLD AUTO: 0.5 % (ref 0–0.5)
LYMPHOCYTES # BLD AUTO: 1 10*3/MM3 (ref 0.7–3.1)
LYMPHOCYTES NFR BLD AUTO: 25.6 % (ref 19.6–45.3)
MAGNESIUM SERPL-MCNC: 1.7 MG/DL (ref 1.6–2.6)
MCH RBC QN AUTO: 30.7 PG (ref 26.6–33)
MCHC RBC AUTO-ENTMCNC: 32.3 G/DL (ref 31.5–35.7)
MCV RBC AUTO: 95.2 FL (ref 79–97)
MONOCYTES # BLD AUTO: 0.31 10*3/MM3 (ref 0.1–0.9)
MONOCYTES NFR BLD AUTO: 7.9 % (ref 5–12)
NEUTROPHILS NFR BLD AUTO: 2.4 10*3/MM3 (ref 1.7–7)
NEUTROPHILS NFR BLD AUTO: 61.4 % (ref 42.7–76)
NRBC BLD AUTO-RTO: 0 /100 WBC (ref 0–0.2)
PLATELET # BLD AUTO: 131 10*3/MM3 (ref 140–450)
PMV BLD AUTO: 11.3 FL (ref 6–12)
POTASSIUM SERPL-SCNC: 4.6 MMOL/L (ref 3.5–5.2)
PROT SERPL-MCNC: 5.8 G/DL (ref 6–8.5)
RBC # BLD AUTO: 2.93 10*6/MM3 (ref 4.14–5.8)
SODIUM SERPL-SCNC: 140 MMOL/L (ref 136–145)
WBC NRBC COR # BLD: 3.91 10*3/MM3 (ref 3.4–10.8)

## 2022-08-27 PROCEDURE — 99213 OFFICE O/P EST LOW 20 MIN: CPT | Performed by: PSYCHIATRY & NEUROLOGY

## 2022-08-27 PROCEDURE — 99213 OFFICE O/P EST LOW 20 MIN: CPT | Performed by: INTERNAL MEDICINE

## 2022-08-27 PROCEDURE — 80053 COMPREHEN METABOLIC PANEL: CPT | Performed by: HOSPITALIST

## 2022-08-27 PROCEDURE — 82728 ASSAY OF FERRITIN: CPT | Performed by: HOSPITALIST

## 2022-08-27 PROCEDURE — G0378 HOSPITAL OBSERVATION PER HR: HCPCS

## 2022-08-27 PROCEDURE — 63710000001 PREDNISONE PER 5 MG: Performed by: INTERNAL MEDICINE

## 2022-08-27 PROCEDURE — 83735 ASSAY OF MAGNESIUM: CPT | Performed by: HOSPITALIST

## 2022-08-27 PROCEDURE — 85025 COMPLETE CBC W/AUTO DIFF WBC: CPT | Performed by: HOSPITALIST

## 2022-08-27 RX ADMIN — SPIRONOLACTONE 25 MG: 25 TABLET ORAL at 16:46

## 2022-08-27 RX ADMIN — Medication 1 MG: at 11:09

## 2022-08-27 RX ADMIN — LACTULOSE 30 G: 10 SOLUTION ORAL at 17:54

## 2022-08-27 RX ADMIN — ALLOPURINOL 100 MG: 100 TABLET ORAL at 11:11

## 2022-08-27 RX ADMIN — OXYCODONE 10 MG: 5 TABLET ORAL at 05:16

## 2022-08-27 RX ADMIN — LACTULOSE 30 G: 10 SOLUTION ORAL at 20:40

## 2022-08-27 RX ADMIN — PANTOPRAZOLE SODIUM 40 MG: 40 TABLET, DELAYED RELEASE ORAL at 16:46

## 2022-08-27 RX ADMIN — PANTOPRAZOLE SODIUM 40 MG: 40 TABLET, DELAYED RELEASE ORAL at 06:34

## 2022-08-27 RX ADMIN — COLCHICINE 0.6 MG: 0.6 TABLET, FILM COATED ORAL at 11:09

## 2022-08-27 RX ADMIN — RIFAXIMIN 550 MG: 550 TABLET ORAL at 11:09

## 2022-08-27 RX ADMIN — LACTULOSE 30 G: 10 SOLUTION ORAL at 11:11

## 2022-08-27 RX ADMIN — FUROSEMIDE 20 MG: 20 TABLET ORAL at 11:09

## 2022-08-27 RX ADMIN — Medication 10 ML: at 21:02

## 2022-08-27 RX ADMIN — PREDNISONE 60 MG: 50 TABLET ORAL at 23:21

## 2022-08-27 RX ADMIN — Medication 100 MG: at 11:09

## 2022-08-27 RX ADMIN — RIFAXIMIN 550 MG: 550 TABLET ORAL at 20:40

## 2022-08-27 RX ADMIN — OXYCODONE 10 MG: 5 TABLET ORAL at 11:25

## 2022-08-27 RX ADMIN — OXYCODONE 10 MG: 5 TABLET ORAL at 17:54

## 2022-08-27 RX ADMIN — FERROUS SULFATE TAB 325 MG (65 MG ELEMENTAL FE) 325 MG: 325 (65 FE) TAB at 11:09

## 2022-08-27 RX ADMIN — CITALOPRAM 20 MG: 20 TABLET, FILM COATED ORAL at 11:09

## 2022-08-27 RX ADMIN — Medication 10 ML: at 11:10

## 2022-08-28 ENCOUNTER — APPOINTMENT (OUTPATIENT)
Dept: MRI IMAGING | Facility: HOSPITAL | Age: 52
End: 2022-08-28

## 2022-08-28 ENCOUNTER — APPOINTMENT (OUTPATIENT)
Dept: CARDIOLOGY | Facility: HOSPITAL | Age: 52
End: 2022-08-28

## 2022-08-28 PROCEDURE — 93306 TTE W/DOPPLER COMPLETE: CPT

## 2022-08-28 PROCEDURE — G0378 HOSPITAL OBSERVATION PER HR: HCPCS

## 2022-08-28 PROCEDURE — 99213 OFFICE O/P EST LOW 20 MIN: CPT | Performed by: NURSE PRACTITIONER

## 2022-08-28 PROCEDURE — 93306 TTE W/DOPPLER COMPLETE: CPT | Performed by: INTERNAL MEDICINE

## 2022-08-28 PROCEDURE — 97530 THERAPEUTIC ACTIVITIES: CPT

## 2022-08-28 RX ADMIN — OXYCODONE 10 MG: 5 TABLET ORAL at 00:10

## 2022-08-28 RX ADMIN — OXYCODONE 10 MG: 5 TABLET ORAL at 21:20

## 2022-08-28 RX ADMIN — OXYCODONE 10 MG: 5 TABLET ORAL at 14:23

## 2022-08-28 RX ADMIN — Medication 10 ML: at 20:07

## 2022-08-28 RX ADMIN — Medication 100 MG: at 08:42

## 2022-08-28 RX ADMIN — RIFAXIMIN 550 MG: 550 TABLET ORAL at 08:42

## 2022-08-28 RX ADMIN — RIFAXIMIN 550 MG: 550 TABLET ORAL at 20:07

## 2022-08-28 RX ADMIN — COLCHICINE 0.6 MG: 0.6 TABLET, FILM COATED ORAL at 08:42

## 2022-08-28 RX ADMIN — LACTULOSE 30 G: 10 SOLUTION ORAL at 18:08

## 2022-08-28 RX ADMIN — FERROUS SULFATE TAB 325 MG (65 MG ELEMENTAL FE) 325 MG: 325 (65 FE) TAB at 08:42

## 2022-08-28 RX ADMIN — FUROSEMIDE 20 MG: 20 TABLET ORAL at 08:41

## 2022-08-28 RX ADMIN — SPIRONOLACTONE 25 MG: 25 TABLET ORAL at 08:42

## 2022-08-28 RX ADMIN — CITALOPRAM 20 MG: 20 TABLET, FILM COATED ORAL at 08:42

## 2022-08-28 RX ADMIN — ALLOPURINOL 100 MG: 100 TABLET ORAL at 08:42

## 2022-08-28 RX ADMIN — PANTOPRAZOLE SODIUM 40 MG: 40 TABLET, DELAYED RELEASE ORAL at 18:08

## 2022-08-28 RX ADMIN — Medication 10 ML: at 08:42

## 2022-08-28 RX ADMIN — OXYCODONE 10 MG: 5 TABLET ORAL at 08:42

## 2022-08-28 RX ADMIN — Medication 1 MG: at 08:42

## 2022-08-28 RX ADMIN — LACTULOSE 30 G: 10 SOLUTION ORAL at 20:06

## 2022-08-28 RX ADMIN — PANTOPRAZOLE SODIUM 40 MG: 40 TABLET, DELAYED RELEASE ORAL at 06:45

## 2022-08-29 ENCOUNTER — READMISSION MANAGEMENT (OUTPATIENT)
Dept: CALL CENTER | Facility: HOSPITAL | Age: 52
End: 2022-08-29

## 2022-08-29 ENCOUNTER — TELEPHONE (OUTPATIENT)
Dept: FAMILY MEDICINE CLINIC | Facility: CLINIC | Age: 52
End: 2022-08-29

## 2022-08-29 VITALS
OXYGEN SATURATION: 94 % | DIASTOLIC BLOOD PRESSURE: 56 MMHG | HEIGHT: 72 IN | HEART RATE: 59 BPM | SYSTOLIC BLOOD PRESSURE: 95 MMHG | BODY MASS INDEX: 23.98 KG/M2 | WEIGHT: 177 LBS | TEMPERATURE: 97.3 F | RESPIRATION RATE: 18 BRPM

## 2022-08-29 PROBLEM — R91.1 PULMONARY NODULE: Status: ACTIVE | Noted: 2022-08-29

## 2022-08-29 LAB
AORTIC DIMENSIONLESS INDEX: 0.8 (DI)
ASCENDING AORTA: 4.6 CM
BACTERIA SPEC AEROBE CULT: NORMAL
BACTERIA SPEC AEROBE CULT: NORMAL
BH CV ECHO MEAS - AO MAX PG: 11.1 MMHG
BH CV ECHO MEAS - AO MEAN PG: 6.2 MMHG
BH CV ECHO MEAS - AO V2 MAX: 167 CM/SEC
BH CV ECHO MEAS - AO V2 VTI: 29.3 CM
BH CV ECHO MEAS - AVA(I,D): 3.3 CM2
BH CV ECHO MEAS - EDV(CUBED): 130 ML
BH CV ECHO MEAS - EDV(MOD-SP2): 60 ML
BH CV ECHO MEAS - EDV(MOD-SP4): 95 ML
BH CV ECHO MEAS - EF(MOD-BP): 62 %
BH CV ECHO MEAS - EF(MOD-SP2): 61.7 %
BH CV ECHO MEAS - EF(MOD-SP4): 64.2 %
BH CV ECHO MEAS - ESV(CUBED): 27.9 ML
BH CV ECHO MEAS - ESV(MOD-SP2): 23 ML
BH CV ECHO MEAS - ESV(MOD-SP4): 34 ML
BH CV ECHO MEAS - FS: 40.1 %
BH CV ECHO MEAS - IVS/LVPW: 1 CM
BH CV ECHO MEAS - IVSD: 1.33 CM
BH CV ECHO MEAS - LAT PEAK E' VEL: 6.6 CM/SEC
BH CV ECHO MEAS - LV MASS(C)D: 278 GRAMS
BH CV ECHO MEAS - LV MAX PG: 4.9 MMHG
BH CV ECHO MEAS - LV MEAN PG: 2.8 MMHG
BH CV ECHO MEAS - LV V1 MAX: 110.5 CM/SEC
BH CV ECHO MEAS - LV V1 VTI: 24 CM
BH CV ECHO MEAS - LVIDD: 5.1 CM
BH CV ECHO MEAS - LVIDS: 3 CM
BH CV ECHO MEAS - LVOT AREA: 4 CM2
BH CV ECHO MEAS - LVOT DIAM: 2.25 CM
BH CV ECHO MEAS - LVPWD: 1.34 CM
BH CV ECHO MEAS - MED PEAK E' VEL: 6.6 CM/SEC
BH CV ECHO MEAS - MV A DUR: 0.17 SEC
BH CV ECHO MEAS - MV A MAX VEL: 97.3 CM/SEC
BH CV ECHO MEAS - MV DEC SLOPE: 211.6 CM/SEC2
BH CV ECHO MEAS - MV DEC TIME: 0.29 MSEC
BH CV ECHO MEAS - MV E MAX VEL: 49.3 CM/SEC
BH CV ECHO MEAS - MV E/A: 0.51
BH CV ECHO MEAS - MV MAX PG: 3.1 MMHG
BH CV ECHO MEAS - MV MEAN PG: 1.4 MMHG
BH CV ECHO MEAS - MV P1/2T: 92 MSEC
BH CV ECHO MEAS - MV V2 VTI: 25.4 CM
BH CV ECHO MEAS - MVA(P1/2T): 2.39 CM2
BH CV ECHO MEAS - MVA(VTI): 3.7 CM2
BH CV ECHO MEAS - PA ACC TIME: 0.17 SEC
BH CV ECHO MEAS - PA PR(ACCEL): 4.1 MMHG
BH CV ECHO MEAS - PA V2 MAX: 118.1 CM/SEC
BH CV ECHO MEAS - PULM A REVS DUR: 0.16 SEC
BH CV ECHO MEAS - PULM A REVS VEL: 51.4 CM/SEC
BH CV ECHO MEAS - PULM DIAS VEL: 32.8 CM/SEC
BH CV ECHO MEAS - PULM S/D: 1.3
BH CV ECHO MEAS - PULM SYS VEL: 42.6 CM/SEC
BH CV ECHO MEAS - QP/QS: 0.53
BH CV ECHO MEAS - RV MAX PG: 3.8 MMHG
BH CV ECHO MEAS - RV V1 MAX: 96.8 CM/SEC
BH CV ECHO MEAS - RV V1 VTI: 16.5 CM
BH CV ECHO MEAS - RVOT DIAM: 1.97 CM
BH CV ECHO MEAS - SV(LVOT): 95.1 ML
BH CV ECHO MEAS - SV(MOD-SP2): 37 ML
BH CV ECHO MEAS - SV(MOD-SP4): 61 ML
BH CV ECHO MEAS - SV(RVOT): 50.4 ML
BH CV ECHO MEAS - TAPSE (>1.6): 1.7 CM
BH CV ECHO MEASUREMENTS AVERAGE E/E' RATIO: 7.47
BH CV VAS BP RIGHT ARM: NORMAL MMHG
BH CV XLRA - TDI S': 9 CM/SEC
LEFT ATRIUM VOLUME INDEX: 18.4 ML/M2
MAXIMAL PREDICTED HEART RATE: 169 BPM
SINUS: 3 CM
STJ: 3.1 CM
STRESS TARGET HR: 144 BPM

## 2022-08-29 PROCEDURE — G0378 HOSPITAL OBSERVATION PER HR: HCPCS

## 2022-08-29 PROCEDURE — 99213 OFFICE O/P EST LOW 20 MIN: CPT | Performed by: NURSE PRACTITIONER

## 2022-08-29 PROCEDURE — 97530 THERAPEUTIC ACTIVITIES: CPT

## 2022-08-29 PROCEDURE — 0 PREDNISONE 5 MG TABLET THERAPY PACK 21 EACH DISP PACK: Performed by: INTERNAL MEDICINE

## 2022-08-29 RX ORDER — FERROUS SULFATE 325(65) MG
325 TABLET ORAL
Qty: 30 TABLET | Refills: 0 | Status: SHIPPED | OUTPATIENT
Start: 2022-08-30 | End: 2022-09-29

## 2022-08-29 RX ADMIN — PREDNISONE 5 MG: 5 TABLET ORAL at 11:56

## 2022-08-29 RX ADMIN — Medication 1 APPLICATION: at 08:22

## 2022-08-29 RX ADMIN — LACTULOSE 30 G: 10 SOLUTION ORAL at 11:55

## 2022-08-29 RX ADMIN — ALLOPURINOL 100 MG: 100 TABLET ORAL at 08:22

## 2022-08-29 RX ADMIN — COLCHICINE 0.6 MG: 0.6 TABLET, FILM COATED ORAL at 08:22

## 2022-08-29 RX ADMIN — FERROUS SULFATE TAB 325 MG (65 MG ELEMENTAL FE) 325 MG: 325 (65 FE) TAB at 08:22

## 2022-08-29 RX ADMIN — CITALOPRAM 20 MG: 20 TABLET, FILM COATED ORAL at 08:22

## 2022-08-29 RX ADMIN — OXYCODONE 10 MG: 5 TABLET ORAL at 05:08

## 2022-08-29 RX ADMIN — FUROSEMIDE 20 MG: 20 TABLET ORAL at 08:22

## 2022-08-29 RX ADMIN — Medication 1 MG: at 08:22

## 2022-08-29 RX ADMIN — RIFAXIMIN 550 MG: 550 TABLET ORAL at 08:24

## 2022-08-29 RX ADMIN — PANTOPRAZOLE SODIUM 40 MG: 40 TABLET, DELAYED RELEASE ORAL at 06:43

## 2022-08-29 RX ADMIN — SPIRONOLACTONE 25 MG: 25 TABLET ORAL at 08:22

## 2022-08-29 RX ADMIN — Medication 100 MG: at 08:22

## 2022-08-29 RX ADMIN — Medication 10 ML: at 08:23

## 2022-08-29 RX ADMIN — PREDNISONE 10 MG: 5 TABLET ORAL at 00:28

## 2022-08-29 RX ADMIN — PREDNISONE 5 MG: 5 TABLET ORAL at 00:28

## 2022-08-29 RX ADMIN — PREDNISONE 5 MG: 5 TABLET ORAL at 08:32

## 2022-08-30 ENCOUNTER — OFFICE VISIT (OUTPATIENT)
Dept: FAMILY MEDICINE CLINIC | Facility: CLINIC | Age: 52
End: 2022-08-30

## 2022-08-30 ENCOUNTER — TRANSITIONAL CARE MANAGEMENT TELEPHONE ENCOUNTER (OUTPATIENT)
Dept: CALL CENTER | Facility: HOSPITAL | Age: 52
End: 2022-08-30

## 2022-08-30 VITALS
WEIGHT: 192.8 LBS | BODY MASS INDEX: 26.11 KG/M2 | SYSTOLIC BLOOD PRESSURE: 100 MMHG | HEIGHT: 72 IN | DIASTOLIC BLOOD PRESSURE: 72 MMHG | HEART RATE: 70 BPM | OXYGEN SATURATION: 99 % | TEMPERATURE: 97.9 F

## 2022-08-30 DIAGNOSIS — I35.1 AORTIC VALVE INSUFFICIENCY, ETIOLOGY OF CARDIAC VALVE DISEASE UNSPECIFIED: ICD-10-CM

## 2022-08-30 DIAGNOSIS — F23 BRIEF PSYCHOTIC DISORDER: ICD-10-CM

## 2022-08-30 DIAGNOSIS — M1A.00X1 GOUTY ARTHROPATHY, CHRONIC, WITH TOPHI: ICD-10-CM

## 2022-08-30 DIAGNOSIS — I71.21 ASCENDING AORTIC ANEURYSM: ICD-10-CM

## 2022-08-30 DIAGNOSIS — K70.30 ALCOHOLIC CIRRHOSIS OF LIVER WITHOUT ASCITES: ICD-10-CM

## 2022-08-30 DIAGNOSIS — E83.42 HYPOMAGNESEMIA: ICD-10-CM

## 2022-08-30 DIAGNOSIS — D50.0 IRON DEFICIENCY ANEMIA DUE TO CHRONIC BLOOD LOSS: ICD-10-CM

## 2022-08-30 DIAGNOSIS — Z09 HOSPITAL DISCHARGE FOLLOW-UP: Primary | ICD-10-CM

## 2022-08-30 DIAGNOSIS — R91.8 PULMONARY NODULES: ICD-10-CM

## 2022-08-30 LAB — BACTERIA ISLT: NORMAL

## 2022-08-30 PROCEDURE — 1111F DSCHRG MED/CURRENT MED MERGE: CPT | Performed by: FAMILY MEDICINE

## 2022-08-30 PROCEDURE — 99495 TRANSJ CARE MGMT MOD F2F 14D: CPT | Performed by: FAMILY MEDICINE

## 2022-08-30 NOTE — OUTREACH NOTE
Prep Survey    Flowsheet Row Responses   [facility] facility patient discharged from? [place]   Is LACE score < 7 ? No   Emergency Room discharge w/ pulse ox? No   Eligibility [facility]   Date of Admission 08/24/22   Date of Discharge 08/29/22   Discharge Disposition Home-Health Care [facility]   Discharge diagnosis anemia, Hepatic cirrhosis, Hepatitis C, Metabolic encephalopathy, brief psychotic disorder   Does the patient have one of the following disease processes/diagnoses(primary or secondary)? Other   Does the patient have Home health ordered? Yes   What is the Home health agency?  [facility]   Is there a DME ordered? No   Prep survey completed? Yes          AMINATA GARCIA - Registered Nurse

## 2022-08-30 NOTE — PROGRESS NOTES
"Transitional Care Follow Up Visit  Subjective     Macario Carpio is a 51 y.o. male who presents for a transitional care management visit.    Within 48 business hours after discharge our office contacted him via telephone to coordinate his care and needs.      I reviewed and discussed the details of that call along with the discharge summary, hospital problems, inpatient lab results, inpatient diagnostic studies, and consultation reports with Macario.     Current outpatient and discharge medications have been reconciled for the patient.  Reviewed by: Trini Beckett DO      Date of TCM Phone Call 8/29/2022   [facility]   Date of Admission 8/24/2022   Date of Discharge 8/29/2022   Discharge Disposition Home-Health Care [facility]     Risk for Readmission (LACE) Score: 8 (8/29/2022  6:00 AM)      History of Present Illness   Course During Hospital Stay:    ED to Hosp-Admission (Discharged) with Juan Burgos MD; Arash Arzate MD (08/24/2022)  CT Chest Without Contrast Diagnostic (08/25/2022 12:45)  XR Elbow 2 View Bilateral (08/25/2022 11:11)  XR Hand 2 View Bilateral (08/25/2022 11:07)  CT Head Without Contrast (08/24/2022 20:49)    \"Hospital Course:      Patient was admitted to the hospital because of concern for mental status changes.  The patient's ammonia level is normal.  We started him on Xifaxan, he was seen by neurology.  By the following day the patient's mental status was improved.  He was also given a dose of Zyprexa and now has been off the Zyprexa for the last couple days and doing well.  Some no plan for Zyprexa on discharge.     Patient is also noted to have wound on his left elbow with white tophi showing through the wound.  He was seen by orthopedics who recommended follow-up with his general surgeon outpatient.  Previously operated on his elbows (Dr Orellana).  Orthopedics also recommend the patient be seen by hand surgery, so hand surgery was asked see the patient and they recommended " "medical management.  Patient was also seen by infectious disease who did not feel the elbow showed any evidence of infection.  There is some surrounding inflammation but thought to be related to the gout.  Patient has been started on steroids and has had some improvement with the steroids.  Patient has an allergy to Indocin but has been on meloxicam at home, the patient can continue with his meloxicam.  We will also give the patient a Pred pack for discharge.  Patient's uric acid level despite being on allopurinol 100 mg a day is still elevated.  He was seen in consultation by nephrology about uric acid level reduction.  And they recommend the patient follow-up with rheumatology outpatient for Krystexxa injection. Wound care has made recommendations concerning care of his left elbow his right foot and right elbow.  And I have reviewed the recommendations with the family and patient.     Patient was also complaining of some chest pain on admission but by the time I saw him he really did not remember having the chest pain.  So chest pain was very atypical.  Patient has had an echocardiogram but is not been read yet.  Cardiology is stated the patient can leave and if necessary they will contact patient for any significant findings.  Cardiology writes in the note the patient has significant AI and encouraged follow-up with cardiology.  He has previously been seen by [facility] but can follow-up with her cardiologist at [facility].     Patient noted to have 4.4 cm ascending aortic aneurysm on imaging and will need to follow-up with vascular surgery outpatient.  Patient states this is previously been measured at 4.7 so it appears this is stable     Patient also has some pulmonary nodules which the radiologist says if the patient's high risk would need yearly follow-up.\"  However, the patient is not a current smoker and never has been a smoker.  Therefore at low risk.     Since being at home, the patient states that he is back to " his baseline.  He states that he has follow-up with Dr. Mckeon scheduled for his left elbow.  He denies any worsening pain or fevers or chills.  He denies any chest pain or shortness of breath.  He presented to the clinic today by himself but was driven by a relative.  Patient also denies any ankle swelling or shortness of breath.  Patient states home health should be visiting him tomorrow.     The following portions of the patient's history were reviewed and updated as appropriate: allergies, current medications, past family history, past medical history, past social history, past surgical history and problem list.    Review of Systems   Constitutional: Negative for activity change, appetite change, fatigue and unexpected weight change.   Respiratory: Negative for cough, shortness of breath and wheezing.    Cardiovascular: Negative for chest pain, palpitations and leg swelling.   Gastrointestinal: Negative for abdominal pain, blood in stool and nausea.   Genitourinary: Negative for dysuria, frequency and urgency.   Musculoskeletal: Negative for arthralgias, joint swelling and myalgias.   Allergic/Immunologic: Negative for environmental allergies, food allergies and immunocompromised state.   Neurological: Negative for dizziness, weakness and headaches.   Hematological: Negative for adenopathy. Does not bruise/bleed easily.   Psychiatric/Behavioral: Negative for confusion, dysphoric mood and sleep disturbance. The patient is not nervous/anxious.        Objective   Physical Exam  Vitals and nursing note reviewed.   Constitutional:       Appearance: He is well-developed.   HENT:      Head: Normocephalic and atraumatic.   Eyes:      Conjunctiva/sclera: Conjunctivae normal.   Cardiovascular:      Rate and Rhythm: Normal rate and regular rhythm.      Heart sounds: Normal heart sounds.   Pulmonary:      Effort: Pulmonary effort is normal.      Breath sounds: Normal breath sounds.   Abdominal:      General: Bowel sounds  are normal.      Palpations: Abdomen is soft.   Musculoskeletal:         General: Normal range of motion.      Cervical back: Normal range of motion and neck supple.   Skin:     General: Skin is warm and dry.      Capillary Refill: Capillary refill takes less than 2 seconds.      Findings: No rash.   Neurological:      Mental Status: He is alert and oriented to person, place, and time.   Psychiatric:         Mood and Affect: Mood normal.         Behavior: Behavior normal.         Thought Content: Thought content normal.         Judgment: Judgment normal.         Assessment & Plan   Diagnoses and all orders for this visit:    1. Hospital discharge follow-up (Primary)    2. Gouty arthropathy, chronic, with tophi    3. Iron deficiency anemia due to chronic blood loss    4. Alcoholic cirrhosis of liver without ascites (HCC)    5. Brief psychotic disorder (HCC)    6. Hypomagnesemia    7. Pulmonary nodules    8. Aortic valve insufficiency, etiology of cardiac valve disease unspecified  -     Ambulatory Referral to Cardiology    9. Ascending aortic aneurysm (HCC)  Comments:  4.6 cm  Orders:  -     Ambulatory Referral to Cardiology    Patient of CALLUM Marcos is here today to follow-up from his recent hospital stay for mental status changes.  The patient is currently at his baseline mentation.  Echo results were discussed with the patient and a referral to cardiology was made for aortic insufficiency and monitoring of a a sending aortic aneurysm.  Patient aware of plan.  New pulmonary nodules on CT scan.  Patient at low risk since he likes exposure and smoking history.  Therefore based on the recommendations of the CT no further follow-up will be recommended unless the patient is symptomatic in the future.  Gouty arthropathy.  Patient was advised to follow-up with Dr. Mckeon as suggested from the hospitalist for his left elbow.  Patient will make an appointment.  Alcoholic cirrhosis of the liver.  Continue to follow  with GI.  Iron deficiency anemia and hypomagnesemia.  Patient has home health and will obtain H&H and magnesium levels in the future.  Magnesium was replaced in the hospital and levels were stable at hospital discharge.  We will continue to monitor.

## 2022-08-30 NOTE — OUTREACH NOTE
Call Center TCM Note    Flowsheet Row Responses   [facility] patient discharged from? [place]   Does the patient have one of the following disease processes/diagnoses(primary or secondary)? Other   TCM attempt successful? Yes   Call start time 1535   Call end time 1535   Discharge diagnosis anemia, Hepatic cirrhosis, Hepatitis C, Metabolic encephalopathy, brief psychotic disorder   TCM call completed? Yes   Wrap up additional comments Pt had TCM follow up with PCP today.          Robyn Womack LPN    8/30/2022, 15:35 EDT

## 2022-09-02 ENCOUNTER — NURSE TRIAGE (OUTPATIENT)
Dept: CALL CENTER | Facility: HOSPITAL | Age: 52
End: 2022-09-02

## 2022-09-02 NOTE — TELEPHONE ENCOUNTER
"Review of chart shows CM note on 08/26/2022 that referral was called to [facility] and was accepted. Gave caller phone number for [facility] in [place].     Reason for Disposition  • Health Information question, no triage required and triager able to answer question    Additional Information  • Negative: [1] Caller is not with the adult (patient) AND [2] reporting urgent symptoms  • Negative: Lab result questions  • Negative: Medication questions  • Negative: Caller can't be reached by phone  • Negative: Caller has already spoken to PCP or another triager  • Negative: RN needs further essential information from caller in order to complete triage  • Negative: Requesting regular office appointment  • Negative: [1] Caller requesting NON-URGENT health information AND [2] PCP's office is the best resource    Answer Assessment - Initial Assessment Questions  1. REASON FOR CALL or QUESTION: \"What is your reason for calling today?\" or \"How can I best help you?\" or \"What question do you have that I can help answer?\"      Caller states they were told he would have HH and have not heard from anyone.    Protocols used: INFORMATION ONLY CALL - NO TRIAGE-ADULT-      "

## 2022-09-04 PROBLEM — F23 BRIEF PSYCHOTIC DISORDER: Status: RESOLVED | Noted: 2022-08-25 | Resolved: 2022-09-04

## 2022-09-06 DIAGNOSIS — E83.42 HYPOMAGNESEMIA: ICD-10-CM

## 2022-09-06 DIAGNOSIS — D50.0 IRON DEFICIENCY ANEMIA DUE TO CHRONIC BLOOD LOSS: ICD-10-CM

## 2022-09-06 DIAGNOSIS — I71.21 ASCENDING AORTIC ANEURYSM: ICD-10-CM

## 2022-09-06 DIAGNOSIS — I35.1 AORTIC VALVE INSUFFICIENCY, ETIOLOGY OF CARDIAC VALVE DISEASE UNSPECIFIED: ICD-10-CM

## 2022-09-06 DIAGNOSIS — K70.30 ALCOHOLIC CIRRHOSIS OF LIVER WITHOUT ASCITES: ICD-10-CM

## 2022-09-06 DIAGNOSIS — M1A.00X1 GOUTY ARTHROPATHY, CHRONIC, WITH TOPHI: Primary | ICD-10-CM

## 2022-09-07 ENCOUNTER — READMISSION MANAGEMENT (OUTPATIENT)
Dept: CALL CENTER | Facility: HOSPITAL | Age: 52
End: 2022-09-07

## 2022-09-07 NOTE — OUTREACH NOTE
Medical Week 2 Survey    Flowsheet Row Responses   [facility] patient discharged from? [place]   Does the patient have one of the following disease processes/diagnoses(primary or secondary)? Other   Week 2 attempt successful? No   Unsuccessful attempts Attempt 1          KAROLINE DIALLO - Licensed Nurse

## 2022-09-09 ENCOUNTER — TELEPHONE (OUTPATIENT)
Dept: FAMILY MEDICINE CLINIC | Facility: CLINIC | Age: 52
End: 2022-09-09

## 2022-09-09 NOTE — TELEPHONE ENCOUNTER
BARBARA FROM [facility] AT [place] IS CALLING TO INFORM THE CLINICAL STAFF THAT THE ORDERS FOR NURSING HAVE BEEN RECEIVED, BUT THE PATIENT WILL NOT LET THEM COME UNTIL Tuesday, 9/13/22.

## 2022-09-15 RX ORDER — RIFAXIMIN 550 MG/1
TABLET ORAL
OUTPATIENT
Start: 2022-09-15

## 2022-09-15 NOTE — TELEPHONE ENCOUNTER
Caller: Macario Carpio    Relationship: Self    Best call back number: [phone]      Requested Prescriptions:   Requested Prescriptions     Pending Prescriptions Disp Refills   • Xifaxan 550 MG tablet          Pharmacy where request should be sent: [facility] - [address] - [phone]  - [phone]     Additional details provided by patient: HAS ONE LEFT NEEDS IT FOR SWELLING IN ARMS AND LEGS.    Does the patient have less than a 3 day supply:  [x] Yes  [] No    Lilly Caldwell, PCT   09/15/22 12:18 EDT

## 2022-09-16 ENCOUNTER — READMISSION MANAGEMENT (OUTPATIENT)
Dept: CALL CENTER | Facility: HOSPITAL | Age: 52
End: 2022-09-16

## 2022-09-16 RX ORDER — CITALOPRAM 20 MG/1
20 TABLET ORAL DAILY
Qty: 90 TABLET | Refills: 0 | Status: SHIPPED | OUTPATIENT
Start: 2022-09-16

## 2022-09-16 RX ORDER — MELOXICAM 7.5 MG/1
7.5 TABLET ORAL DAILY
Qty: 90 TABLET | Refills: 0 | Status: SHIPPED | OUTPATIENT
Start: 2022-09-16

## 2022-09-16 NOTE — OUTREACH NOTE
Medical Week 3 Survey    Flowsheet Row Responses   [facility] patient discharged from? [place]   Does the patient have one of the following disease processes/diagnoses(primary or secondary)? Other   Week 3 attempt successful? Yes   Call start time 1603   Call end time 1604   Discharge diagnosis anemia, Hepatic cirrhosis, Hepatitis C, Metabolic encephalopathy, brief psychotic disorder   Meds reviewed with patient/caregiver? Yes   Is the patient having any side effects they believe may be caused by any medication additions or changes? No   Does the patient have all medications ordered at discharge? Yes   Is the patient taking all medications as directed (includes completed medication regime)? Yes   Does the patient have a primary care provider?  Yes   Does the patient have an appointment with their PCP within 7 days of discharge? Yes   Has the patient kept scheduled appointments due by today? Yes   What is the Home health agency?  [facility]   Psychosocial issues? No   Did the patient receive a copy of their discharge instructions? Yes   Nursing interventions Reviewed instructions with patient   What is the patient's perception of their health status since discharge? Improving   Is the patient/caregiver able to teach back signs and symptoms related to disease process for when to call PCP? Yes   Is the patient/caregiver able to teach back signs and symptoms related to disease process for when to call 911? Yes   Is the patient/caregiver able to teach back the hierarchy of who to call/visit for symptoms/problems? PCP, Specialist, Home health nurse, Urgent Care, ED, 911 Yes   If the patient is a current smoker, are they able to teach back resources for cessation? Not a smoker   Week 3 Call Completed? Yes   Graduated Yes   Did the patient feel the follow up calls were helpful during their recovery period? Yes   Was the number of calls appropriate? Yes   Wrap up additional comments Goals met. Patient reports he is doing  cooper.           HERBIE PRICE - Registered Nurse

## 2022-09-30 ENCOUNTER — TELEPHONE (OUTPATIENT)
Dept: FAMILY MEDICINE CLINIC | Facility: CLINIC | Age: 52
End: 2022-09-30

## 2022-09-30 NOTE — TELEPHONE ENCOUNTER
He saw Dr. Beckett on August 30.  [facility] for home health orders.  He needs to make sure to keep hospital follow-up for Dr. Cárdenas to see him.

## 2022-09-30 NOTE — TELEPHONE ENCOUNTER
9/6 there was a order put in for [facility] but I guess they did not go.   I attempted to call pt and no answer no    [phone]

## 2022-10-05 ENCOUNTER — OFFICE VISIT (OUTPATIENT)
Dept: FAMILY MEDICINE CLINIC | Facility: CLINIC | Age: 52
End: 2022-10-05

## 2022-10-06 RX ORDER — AMLODIPINE BESYLATE 5 MG/1
TABLET ORAL
Qty: 30 TABLET | Refills: 2 | OUTPATIENT
Start: 2022-10-06

## 2022-10-06 RX ORDER — LOSARTAN POTASSIUM 50 MG/1
TABLET ORAL
Qty: 30 TABLET | Refills: 2 | OUTPATIENT
Start: 2022-10-06

## 2022-10-14 RX ORDER — AMLODIPINE BESYLATE 5 MG/1
TABLET ORAL
Qty: 30 TABLET | Refills: 2 | OUTPATIENT
Start: 2022-10-14

## 2022-11-03 RX ORDER — LOSARTAN POTASSIUM 50 MG/1
TABLET ORAL
Qty: 30 TABLET | Refills: 2 | OUTPATIENT
Start: 2022-11-03

## 2022-11-03 RX ORDER — AMLODIPINE BESYLATE 5 MG/1
TABLET ORAL
Qty: 30 TABLET | Refills: 2 | OUTPATIENT
Start: 2022-11-03

## 2022-11-07 RX ORDER — CITALOPRAM 20 MG/1
TABLET ORAL
Qty: 90 TABLET | Refills: 0 | OUTPATIENT
Start: 2022-11-07

## 2022-11-07 RX ORDER — PANTOPRAZOLE SODIUM 40 MG/1
TABLET, DELAYED RELEASE ORAL
Qty: 90 TABLET | Refills: 0 | OUTPATIENT
Start: 2022-11-07

## 2022-11-07 RX ORDER — CARVEDILOL 25 MG/1
TABLET ORAL
Qty: 180 TABLET | Refills: 0 | OUTPATIENT
Start: 2022-11-07

## 2022-11-10 RX ORDER — ATORVASTATIN CALCIUM 80 MG/1
TABLET, FILM COATED ORAL
Qty: 90 TABLET | Refills: 0 | OUTPATIENT
Start: 2022-11-10

## 2022-11-14 RX ORDER — AMLODIPINE BESYLATE 5 MG/1
TABLET ORAL
Qty: 30 TABLET | Refills: 0 | Status: SHIPPED | OUTPATIENT
Start: 2022-11-14

## 2022-12-14 RX ORDER — MELOXICAM 7.5 MG/1
TABLET ORAL
Qty: 90 TABLET | Refills: 0 | OUTPATIENT
Start: 2022-12-14

## 2022-12-14 RX ORDER — AMLODIPINE BESYLATE 5 MG/1
TABLET ORAL
Qty: 30 TABLET | Refills: 0 | OUTPATIENT
Start: 2022-12-14

## 2023-01-13 RX ORDER — AMLODIPINE BESYLATE 5 MG/1
TABLET ORAL
Qty: 30 TABLET | Refills: 0 | OUTPATIENT
Start: 2023-01-13

## 2023-01-13 RX ORDER — MELOXICAM 7.5 MG/1
TABLET ORAL
Qty: 90 TABLET | Refills: 0 | OUTPATIENT
Start: 2023-01-13

## 2023-03-13 RX ORDER — CITALOPRAM 20 MG/1
TABLET ORAL
Qty: 90 TABLET | Refills: 0 | OUTPATIENT
Start: 2023-03-13

## 2023-03-21 RX ORDER — ATORVASTATIN CALCIUM 80 MG/1
TABLET, FILM COATED ORAL
Qty: 30 TABLET | Refills: 5 | OUTPATIENT
Start: 2023-03-21

## 2023-04-12 RX ORDER — CITALOPRAM 20 MG/1
TABLET ORAL
Qty: 90 TABLET | Refills: 0 | OUTPATIENT
Start: 2023-04-12

## 2023-04-12 RX ORDER — ATORVASTATIN CALCIUM 80 MG/1
TABLET, FILM COATED ORAL
Qty: 30 TABLET | Refills: 5 | OUTPATIENT
Start: 2023-04-12

## 2023-05-12 RX ORDER — ATORVASTATIN CALCIUM 80 MG/1
TABLET, FILM COATED ORAL
Qty: 30 TABLET | Refills: 5 | OUTPATIENT
Start: 2023-05-12

## 2023-05-12 RX ORDER — CITALOPRAM 20 MG/1
TABLET ORAL
Qty: 90 TABLET | Refills: 0 | OUTPATIENT
Start: 2023-05-12

## 2023-05-16 RX ORDER — LOSARTAN POTASSIUM 50 MG/1
TABLET ORAL
Qty: 90 TABLET | Refills: 11 | OUTPATIENT
Start: 2023-05-16

## 2024-12-16 NOTE — TELEPHONE ENCOUNTER
Spoke with patient and he is stating that he got it in the hospital when he had surgery on his elbow informed him to contact his surgeon   No

## 2025-07-29 RX ORDER — MELOXICAM 15 MG/1
15 TABLET ORAL DAILY
Qty: 30 TABLET | Refills: 0 | OUTPATIENT
Start: 2025-07-29

## (undated) DEVICE — CANN O2 ETCO2 FITS ALL CONN CO2 SMPL A/ 7IN DISP LF

## (undated) DEVICE — KT ORCA ORCAPOD DISP STRL

## (undated) DEVICE — LIGATOR MULTIBAND SUPERVIEW 7 BX4

## (undated) DEVICE — ADAPT CLN BIOGUARD AIR/H2O DISP

## (undated) DEVICE — FRCP BX RADJAW4 NDL 2.8 240CM LG OG BX40

## (undated) DEVICE — LN SMPL CO2 SHTRM SD STREAM W/M LUER

## (undated) DEVICE — TUBING, SUCTION, 1/4" X 10', STRAIGHT: Brand: MEDLINE

## (undated) DEVICE — BITEBLOCK OMNI BLOC

## (undated) DEVICE — SENSR O2 OXIMAX FNGR A/ 18IN NONSTR